# Patient Record
Sex: MALE | URBAN - METROPOLITAN AREA
[De-identification: names, ages, dates, MRNs, and addresses within clinical notes are randomized per-mention and may not be internally consistent; named-entity substitution may affect disease eponyms.]

---

## 2022-04-29 ENCOUNTER — NURSING HOME VISIT (OUTPATIENT)
Dept: NEPHROLOGY | Age: 52
End: 2022-04-29

## 2022-04-29 VITALS
SYSTOLIC BLOOD PRESSURE: 132 MMHG | HEART RATE: 105 BPM | WEIGHT: 223 LBS | TEMPERATURE: 97.9 F | OXYGEN SATURATION: 95 % | RESPIRATION RATE: 18 BRPM | HEIGHT: 69 IN | DIASTOLIC BLOOD PRESSURE: 72 MMHG | BODY MASS INDEX: 33.03 KG/M2

## 2022-04-29 DIAGNOSIS — E87.0 HYPEROSMOLALITY WITH HYPERNATREMIA: ICD-10-CM

## 2022-04-29 DIAGNOSIS — I10 PRIMARY HYPERTENSION: Primary | ICD-10-CM

## 2022-04-29 PROCEDURE — 99309 SBSQ NF CARE MODERATE MDM 30: CPT | Performed by: NURSE PRACTITIONER

## 2022-05-02 ENCOUNTER — NURSING HOME VISIT (OUTPATIENT)
Dept: NEPHROLOGY | Age: 52
End: 2022-05-02

## 2022-05-02 VITALS
OXYGEN SATURATION: 96 % | RESPIRATION RATE: 17 BRPM | TEMPERATURE: 97.1 F | DIASTOLIC BLOOD PRESSURE: 71 MMHG | HEART RATE: 103 BPM | SYSTOLIC BLOOD PRESSURE: 147 MMHG

## 2022-05-02 DIAGNOSIS — E87.0 HYPEROSMOLALITY WITH HYPERNATREMIA: ICD-10-CM

## 2022-05-02 DIAGNOSIS — R13.10 DYSPHAGIA, UNSPECIFIED TYPE: Primary | ICD-10-CM

## 2022-05-02 PROCEDURE — 99309 SBSQ NF CARE MODERATE MDM 30: CPT | Performed by: NURSE PRACTITIONER

## 2022-05-03 ENCOUNTER — NURSING HOME VISIT (OUTPATIENT)
Dept: NEPHROLOGY | Age: 52
End: 2022-05-03

## 2022-05-03 VITALS
DIASTOLIC BLOOD PRESSURE: 78 MMHG | SYSTOLIC BLOOD PRESSURE: 136 MMHG | TEMPERATURE: 97.4 F | HEART RATE: 79 BPM | BODY MASS INDEX: 32.93 KG/M2 | WEIGHT: 223 LBS | OXYGEN SATURATION: 93 % | RESPIRATION RATE: 18 BRPM

## 2022-05-03 DIAGNOSIS — I10 PRIMARY HYPERTENSION: ICD-10-CM

## 2022-05-03 DIAGNOSIS — R13.10 DYSPHAGIA, UNSPECIFIED TYPE: ICD-10-CM

## 2022-05-03 DIAGNOSIS — E87.0 HYPEROSMOLALITY WITH HYPERNATREMIA: Primary | ICD-10-CM

## 2022-05-03 PROCEDURE — 99309 SBSQ NF CARE MODERATE MDM 30: CPT | Performed by: NURSE PRACTITIONER

## 2022-05-05 ENCOUNTER — NURSING HOME VISIT (OUTPATIENT)
Dept: NEPHROLOGY | Age: 52
End: 2022-05-05

## 2022-05-05 VITALS
BODY MASS INDEX: 34.97 KG/M2 | WEIGHT: 236.8 LBS | RESPIRATION RATE: 17 BRPM | HEART RATE: 117 BPM | OXYGEN SATURATION: 96 % | TEMPERATURE: 97.2 F | SYSTOLIC BLOOD PRESSURE: 138 MMHG | DIASTOLIC BLOOD PRESSURE: 60 MMHG

## 2022-05-05 DIAGNOSIS — I10 PRIMARY HYPERTENSION: ICD-10-CM

## 2022-05-05 DIAGNOSIS — E87.0 HYPEROSMOLALITY WITH HYPERNATREMIA: Primary | ICD-10-CM

## 2022-05-05 PROCEDURE — 99309 SBSQ NF CARE MODERATE MDM 30: CPT | Performed by: NURSE PRACTITIONER

## 2022-05-09 ENCOUNTER — NURSING HOME VISIT (OUTPATIENT)
Dept: NEPHROLOGY | Age: 52
End: 2022-05-09

## 2022-05-09 VITALS
HEART RATE: 95 BPM | WEIGHT: 236.8 LBS | RESPIRATION RATE: 18 BRPM | TEMPERATURE: 98.1 F | OXYGEN SATURATION: 96 % | DIASTOLIC BLOOD PRESSURE: 60 MMHG | SYSTOLIC BLOOD PRESSURE: 122 MMHG | BODY MASS INDEX: 34.97 KG/M2

## 2022-05-09 DIAGNOSIS — E87.0 HYPEROSMOLALITY WITH HYPERNATREMIA: Primary | ICD-10-CM

## 2022-05-09 DIAGNOSIS — I10 PRIMARY HYPERTENSION: ICD-10-CM

## 2022-05-09 PROCEDURE — 99309 SBSQ NF CARE MODERATE MDM 30: CPT | Performed by: NURSE PRACTITIONER

## 2022-05-17 ENCOUNTER — NURSING HOME VISIT (OUTPATIENT)
Dept: NEPHROLOGY | Age: 52
End: 2022-05-17

## 2022-05-17 VITALS
BODY MASS INDEX: 34.94 KG/M2 | RESPIRATION RATE: 18 BRPM | TEMPERATURE: 97.1 F | SYSTOLIC BLOOD PRESSURE: 132 MMHG | DIASTOLIC BLOOD PRESSURE: 78 MMHG | HEART RATE: 100 BPM | OXYGEN SATURATION: 95 % | WEIGHT: 236.6 LBS

## 2022-05-17 DIAGNOSIS — E87.0 HYPEROSMOLALITY WITH HYPERNATREMIA: Primary | ICD-10-CM

## 2022-05-17 DIAGNOSIS — N17.9 AKI (ACUTE KIDNEY INJURY) (CMD): ICD-10-CM

## 2022-05-17 DIAGNOSIS — R13.10 DYSPHAGIA, UNSPECIFIED TYPE: ICD-10-CM

## 2022-05-17 PROCEDURE — 99309 SBSQ NF CARE MODERATE MDM 30: CPT | Performed by: NURSE PRACTITIONER

## 2022-05-27 PROBLEM — N39.0 ACUTE UTI: Status: ACTIVE | Noted: 2022-05-27

## 2022-05-27 PROBLEM — I69.320 APHASIA AS LATE EFFECT OF CEREBROVASCULAR ACCIDENT: Status: ACTIVE | Noted: 2022-05-27

## 2022-05-27 PROBLEM — R33.9 URINARY RETENTION: Status: ACTIVE | Noted: 2022-05-27

## 2022-05-27 PROBLEM — L89.310: Status: ACTIVE | Noted: 2022-05-27

## 2022-05-27 PROBLEM — G81.91 RIGHT HEMIPLEGIA (HCC): Status: ACTIVE | Noted: 2022-05-27

## 2022-05-27 PROBLEM — I10 PRIMARY HYPERTENSION: Status: ACTIVE | Noted: 2022-05-27

## 2022-05-27 PROBLEM — I61.9 HEMORRHAGIC CEREBROVASCULAR ACCIDENT (CVA) (HCC): Status: ACTIVE | Noted: 2022-05-27

## 2022-05-27 PROBLEM — R13.12 OROPHARYNGEAL DYSPHAGIA: Status: ACTIVE | Noted: 2022-05-27

## 2022-05-27 PROBLEM — L89.320 PRESSURE INJURY OF LEFT BUTTOCK, UNSTAGEABLE (HCC): Status: ACTIVE | Noted: 2022-05-27

## 2022-06-07 ENCOUNTER — HOSPITAL ENCOUNTER (INPATIENT)
Facility: HOSPITAL | Age: 52
LOS: 3 days | Discharge: NURSING, REHAB OR LONG-TERM CARE | End: 2022-06-10
Attending: EMERGENCY MEDICINE | Admitting: INTERNAL MEDICINE
Payer: COMMERCIAL

## 2022-06-07 ENCOUNTER — APPOINTMENT (OUTPATIENT)
Dept: GENERAL RADIOLOGY | Facility: HOSPITAL | Age: 52
End: 2022-06-07
Attending: EMERGENCY MEDICINE
Payer: COMMERCIAL

## 2022-06-07 DIAGNOSIS — N39.0 SEPSIS DUE TO URINARY TRACT INFECTION (HCC): Primary | ICD-10-CM

## 2022-06-07 DIAGNOSIS — A41.9 SEPSIS DUE TO URINARY TRACT INFECTION (HCC): Primary | ICD-10-CM

## 2022-06-07 LAB
ADENOVIRUS PCR:: NOT DETECTED
ALBUMIN SERPL-MCNC: 2.5 G/DL (ref 3.4–5)
ALBUMIN/GLOB SERPL: 0.5 {RATIO} (ref 1–2)
ALP LIVER SERPL-CCNC: 116 U/L
ALT SERPL-CCNC: 45 U/L
ANION GAP SERPL CALC-SCNC: 9 MMOL/L (ref 0–18)
AST SERPL-CCNC: 27 U/L (ref 15–37)
ATRIAL RATE: 133 BPM
B PARAPERT DNA SPEC QL NAA+PROBE: NOT DETECTED
B PERT DNA SPEC QL NAA+PROBE: NOT DETECTED
BASOPHILS # BLD AUTO: 0.03 X10(3) UL (ref 0–0.2)
BASOPHILS NFR BLD AUTO: 0.2 %
BILIRUB SERPL-MCNC: 0.4 MG/DL (ref 0.1–2)
BILIRUB UR QL STRIP.AUTO: NEGATIVE
BUN BLD-MCNC: 27 MG/DL (ref 7–18)
C PNEUM DNA SPEC QL NAA+PROBE: NOT DETECTED
CALCIUM BLD-MCNC: 9.6 MG/DL (ref 8.5–10.1)
CHLORIDE SERPL-SCNC: 104 MMOL/L (ref 98–112)
CO2 SERPL-SCNC: 24 MMOL/L (ref 21–32)
COLOR UR AUTO: YELLOW
CORONAVIRUS 229E PCR:: NOT DETECTED
CORONAVIRUS HKU1 PCR:: NOT DETECTED
CORONAVIRUS NL63 PCR:: NOT DETECTED
CORONAVIRUS OC43 PCR:: NOT DETECTED
CREAT BLD-MCNC: 0.97 MG/DL
EOSINOPHIL # BLD AUTO: 0.01 X10(3) UL (ref 0–0.7)
EOSINOPHIL NFR BLD AUTO: 0.1 %
ERYTHROCYTE [DISTWIDTH] IN BLOOD BY AUTOMATED COUNT: 12.8 %
FLUAV RNA SPEC QL NAA+PROBE: NOT DETECTED
FLUBV RNA SPEC QL NAA+PROBE: NOT DETECTED
GLOBULIN PLAS-MCNC: 4.8 G/DL (ref 2.8–4.4)
GLUCOSE BLD-MCNC: 128 MG/DL (ref 70–99)
GLUCOSE UR STRIP.AUTO-MCNC: NEGATIVE MG/DL
HCT VFR BLD AUTO: 30 %
HGB BLD-MCNC: 9.8 G/DL
HYALINE CASTS #/AREA URNS AUTO: PRESENT /LPF
IMM GRANULOCYTES # BLD AUTO: 0.07 X10(3) UL (ref 0–1)
IMM GRANULOCYTES NFR BLD: 0.5 %
INR BLD: 1.4 (ref 0.8–1.2)
LACTATE SERPL-SCNC: 0.8 MMOL/L (ref 0.4–2)
LYMPHOCYTES # BLD AUTO: 0.77 X10(3) UL (ref 1–4)
LYMPHOCYTES NFR BLD AUTO: 5.7 %
MCH RBC QN AUTO: 29 PG (ref 26–34)
MCHC RBC AUTO-ENTMCNC: 32.7 G/DL (ref 31–37)
MCV RBC AUTO: 88.8 FL
METAPNEUMOVIRUS PCR:: NOT DETECTED
MONOCYTES # BLD AUTO: 0.63 X10(3) UL (ref 0.1–1)
MONOCYTES NFR BLD AUTO: 4.7 %
MYCOPLASMA PNEUMONIA PCR:: NOT DETECTED
NEUTROPHILS # BLD AUTO: 11.95 X10 (3) UL (ref 1.5–7.7)
NEUTROPHILS # BLD AUTO: 11.95 X10(3) UL (ref 1.5–7.7)
NEUTROPHILS NFR BLD AUTO: 88.8 %
NITRITE UR QL STRIP.AUTO: NEGATIVE
OSMOLALITY SERPL CALC.SUM OF ELEC: 291 MOSM/KG (ref 275–295)
P AXIS: 54 DEGREES
P-R INTERVAL: 144 MS
PARAINFLUENZA 1 PCR:: NOT DETECTED
PARAINFLUENZA 2 PCR:: NOT DETECTED
PARAINFLUENZA 3 PCR:: NOT DETECTED
PARAINFLUENZA 4 PCR:: NOT DETECTED
PH UR STRIP.AUTO: 7 [PH] (ref 5–8)
PLATELET # BLD AUTO: 415 10(3)UL (ref 150–450)
POTASSIUM SERPL-SCNC: 4.2 MMOL/L (ref 3.5–5.1)
PROCALCITONIN SERPL-MCNC: 0.56 NG/ML (ref ?–0.16)
PROT SERPL-MCNC: 7.3 G/DL (ref 6.4–8.2)
PROT UR STRIP.AUTO-MCNC: 100 MG/DL
PROTHROMBIN TIME: 17.2 SECONDS (ref 11.6–14.8)
Q-T INTERVAL: 292 MS
QRS DURATION: 84 MS
QTC CALCULATION (BEZET): 434 MS
R AXIS: -1 DEGREES
RBC # BLD AUTO: 3.38 X10(6)UL
RBC #/AREA URNS AUTO: >10 /HPF
RHINOVIRUS/ENTERO PCR:: NOT DETECTED
RSV RNA SPEC QL NAA+PROBE: NOT DETECTED
SARS-COV-2 RNA NPH QL NAA+NON-PROBE: NOT DETECTED
SODIUM SERPL-SCNC: 137 MMOL/L (ref 136–145)
SP GR UR STRIP.AUTO: 1.02 (ref 1–1.03)
T AXIS: 52 DEGREES
UROBILINOGEN UR STRIP.AUTO-MCNC: 4 MG/DL
VENTRICULAR RATE: 133 BPM
WBC # BLD AUTO: 13.5 X10(3) UL (ref 4–11)

## 2022-06-07 PROCEDURE — 85610 PROTHROMBIN TIME: CPT | Performed by: EMERGENCY MEDICINE

## 2022-06-07 PROCEDURE — 99285 EMERGENCY DEPT VISIT HI MDM: CPT

## 2022-06-07 PROCEDURE — 97162 PT EVAL MOD COMPLEX 30 MIN: CPT

## 2022-06-07 PROCEDURE — 96375 TX/PRO/DX INJ NEW DRUG ADDON: CPT

## 2022-06-07 PROCEDURE — 97530 THERAPEUTIC ACTIVITIES: CPT

## 2022-06-07 PROCEDURE — 87077 CULTURE AEROBIC IDENTIFY: CPT | Performed by: EMERGENCY MEDICINE

## 2022-06-07 PROCEDURE — 84145 PROCALCITONIN (PCT): CPT | Performed by: EMERGENCY MEDICINE

## 2022-06-07 PROCEDURE — 93005 ELECTROCARDIOGRAM TRACING: CPT

## 2022-06-07 PROCEDURE — 80053 COMPREHEN METABOLIC PANEL: CPT | Performed by: EMERGENCY MEDICINE

## 2022-06-07 PROCEDURE — 96376 TX/PRO/DX INJ SAME DRUG ADON: CPT

## 2022-06-07 PROCEDURE — 85025 COMPLETE CBC W/AUTO DIFF WBC: CPT | Performed by: EMERGENCY MEDICINE

## 2022-06-07 PROCEDURE — 96361 HYDRATE IV INFUSION ADD-ON: CPT

## 2022-06-07 PROCEDURE — 92523 SPEECH SOUND LANG COMPREHEN: CPT

## 2022-06-07 PROCEDURE — 84244 ASSAY OF RENIN: CPT | Performed by: INTERNAL MEDICINE

## 2022-06-07 PROCEDURE — 97166 OT EVAL MOD COMPLEX 45 MIN: CPT

## 2022-06-07 PROCEDURE — 83835 ASSAY OF METANEPHRINES: CPT | Performed by: INTERNAL MEDICINE

## 2022-06-07 PROCEDURE — 93010 ELECTROCARDIOGRAM REPORT: CPT

## 2022-06-07 PROCEDURE — 0202U NFCT DS 22 TRGT SARS-COV-2: CPT | Performed by: EMERGENCY MEDICINE

## 2022-06-07 PROCEDURE — 71045 X-RAY EXAM CHEST 1 VIEW: CPT | Performed by: EMERGENCY MEDICINE

## 2022-06-07 PROCEDURE — 87040 BLOOD CULTURE FOR BACTERIA: CPT | Performed by: EMERGENCY MEDICINE

## 2022-06-07 PROCEDURE — 92610 EVALUATE SWALLOWING FUNCTION: CPT

## 2022-06-07 PROCEDURE — 87186 SC STD MICRODIL/AGAR DIL: CPT | Performed by: EMERGENCY MEDICINE

## 2022-06-07 PROCEDURE — 82088 ASSAY OF ALDOSTERONE: CPT | Performed by: INTERNAL MEDICINE

## 2022-06-07 PROCEDURE — 87086 URINE CULTURE/COLONY COUNT: CPT | Performed by: EMERGENCY MEDICINE

## 2022-06-07 PROCEDURE — 83605 ASSAY OF LACTIC ACID: CPT | Performed by: EMERGENCY MEDICINE

## 2022-06-07 PROCEDURE — 96366 THER/PROPH/DIAG IV INF ADDON: CPT

## 2022-06-07 PROCEDURE — 96365 THER/PROPH/DIAG IV INF INIT: CPT

## 2022-06-07 PROCEDURE — 81001 URINALYSIS AUTO W/SCOPE: CPT | Performed by: EMERGENCY MEDICINE

## 2022-06-07 RX ORDER — CLONIDINE HYDROCHLORIDE 0.2 MG/1
0.2 TABLET ORAL 2 TIMES DAILY
Status: DISCONTINUED | OUTPATIENT
Start: 2022-06-07 | End: 2022-06-07

## 2022-06-07 RX ORDER — CLONIDINE HYDROCHLORIDE 0.2 MG/1
0.2 TABLET ORAL 3 TIMES DAILY
Status: DISCONTINUED | OUTPATIENT
Start: 2022-06-07 | End: 2022-06-10

## 2022-06-07 RX ORDER — ACETAMINOPHEN 650 MG/1
650 SUPPOSITORY RECTAL ONCE
Status: COMPLETED | OUTPATIENT
Start: 2022-06-07 | End: 2022-06-07

## 2022-06-07 RX ORDER — METOPROLOL TARTRATE 100 MG/1
100 TABLET ORAL
Status: DISCONTINUED | OUTPATIENT
Start: 2022-06-07 | End: 2022-06-10

## 2022-06-07 RX ORDER — HYDROCODONE BITARTRATE AND ACETAMINOPHEN 5; 325 MG/1; MG/1
1 TABLET ORAL EVERY 4 HOURS PRN
Status: DISCONTINUED | OUTPATIENT
Start: 2022-06-07 | End: 2022-06-10

## 2022-06-07 RX ORDER — LACTOSE-REDUCED FOOD/FIBER 0.06 G-1.5
LIQUID (ML) ORAL
COMMUNITY

## 2022-06-07 RX ORDER — LABETALOL HYDROCHLORIDE 5 MG/ML
20 INJECTION, SOLUTION INTRAVENOUS ONCE
Status: COMPLETED | OUTPATIENT
Start: 2022-06-07 | End: 2022-06-07

## 2022-06-07 RX ORDER — ONDANSETRON 2 MG/ML
4 INJECTION INTRAMUSCULAR; INTRAVENOUS EVERY 6 HOURS PRN
Status: DISCONTINUED | OUTPATIENT
Start: 2022-06-07 | End: 2022-06-10

## 2022-06-07 RX ORDER — MELATONIN
325 3 TIMES DAILY
COMMUNITY

## 2022-06-07 RX ORDER — ACETAMINOPHEN 325 MG/1
650 TABLET ORAL EVERY 4 HOURS PRN
Status: DISCONTINUED | OUTPATIENT
Start: 2022-06-07 | End: 2022-06-10

## 2022-06-07 RX ORDER — LISINOPRIL 40 MG/1
40 TABLET ORAL DAILY
Status: DISCONTINUED | OUTPATIENT
Start: 2022-06-07 | End: 2022-06-10

## 2022-06-07 RX ORDER — HYDRALAZINE HYDROCHLORIDE 50 MG/1
100 TABLET, FILM COATED ORAL 3 TIMES DAILY
Status: DISCONTINUED | OUTPATIENT
Start: 2022-06-07 | End: 2022-06-10

## 2022-06-07 RX ORDER — CLONIDINE HYDROCHLORIDE 0.2 MG/1
0.2 TABLET ORAL 2 TIMES DAILY
COMMUNITY

## 2022-06-07 RX ORDER — SODIUM CHLORIDE 9 MG/ML
INJECTION, SOLUTION INTRAVENOUS CONTINUOUS
Status: DISCONTINUED | OUTPATIENT
Start: 2022-06-07 | End: 2022-06-10

## 2022-06-07 RX ORDER — METHYLPHENIDATE HYDROCHLORIDE 5 MG/1
2.5 TABLET ORAL EVERY MORNING
COMMUNITY
End: 2022-06-10

## 2022-06-07 RX ORDER — MORPHINE SULFATE 4 MG/ML
4 INJECTION, SOLUTION INTRAMUSCULAR; INTRAVENOUS EVERY 2 HOUR PRN
Status: DISCONTINUED | OUTPATIENT
Start: 2022-06-07 | End: 2022-06-10

## 2022-06-07 RX ORDER — LISINOPRIL 40 MG/1
40 TABLET ORAL DAILY
COMMUNITY

## 2022-06-07 RX ORDER — SPIRONOLACTONE 25 MG/1
12.5 TABLET ORAL 2 TIMES DAILY
Status: DISCONTINUED | OUTPATIENT
Start: 2022-06-07 | End: 2022-06-07

## 2022-06-07 RX ORDER — MORPHINE SULFATE 2 MG/ML
1 INJECTION, SOLUTION INTRAMUSCULAR; INTRAVENOUS EVERY 2 HOUR PRN
Status: DISCONTINUED | OUTPATIENT
Start: 2022-06-07 | End: 2022-06-10

## 2022-06-07 RX ORDER — DOXAZOSIN 8 MG/1
8 TABLET ORAL NIGHTLY
COMMUNITY

## 2022-06-07 RX ORDER — SPIRONOLACTONE 25 MG/1
12.5 TABLET ORAL 2 TIMES DAILY
COMMUNITY
End: 2022-06-10

## 2022-06-07 RX ORDER — AMLODIPINE BESYLATE 10 MG/1
10 TABLET ORAL DAILY
COMMUNITY

## 2022-06-07 RX ORDER — HYDRALAZINE HYDROCHLORIDE 100 MG/1
100 TABLET, FILM COATED ORAL 3 TIMES DAILY
COMMUNITY

## 2022-06-07 RX ORDER — ENOXAPARIN SODIUM 100 MG/ML
40 INJECTION SUBCUTANEOUS DAILY
Status: DISCONTINUED | OUTPATIENT
Start: 2022-06-07 | End: 2022-06-07

## 2022-06-07 RX ORDER — TERAZOSIN 10 MG/1
10 CAPSULE ORAL NIGHTLY
Status: DISCONTINUED | OUTPATIENT
Start: 2022-06-07 | End: 2022-06-10

## 2022-06-07 RX ORDER — LABETALOL HYDROCHLORIDE 5 MG/ML
20 INJECTION, SOLUTION INTRAVENOUS EVERY 4 HOURS PRN
Status: DISCONTINUED | OUTPATIENT
Start: 2022-06-07 | End: 2022-06-10

## 2022-06-07 RX ORDER — MORPHINE SULFATE 2 MG/ML
2 INJECTION, SOLUTION INTRAMUSCULAR; INTRAVENOUS EVERY 2 HOUR PRN
Status: DISCONTINUED | OUTPATIENT
Start: 2022-06-07 | End: 2022-06-10

## 2022-06-07 RX ORDER — HYDROCODONE BITARTRATE AND ACETAMINOPHEN 5; 325 MG/1; MG/1
2 TABLET ORAL EVERY 4 HOURS PRN
Status: DISCONTINUED | OUTPATIENT
Start: 2022-06-07 | End: 2022-06-10

## 2022-06-07 RX ORDER — ARGININE/ASCORBATE SOD/VITE AC 4.5 G/9.2G
1 POWDER IN PACKET (EA) ORAL 2 TIMES DAILY
COMMUNITY

## 2022-06-07 RX ORDER — AMLODIPINE BESYLATE 5 MG/1
5 TABLET ORAL 2 TIMES DAILY
Status: DISCONTINUED | OUTPATIENT
Start: 2022-06-07 | End: 2022-06-10

## 2022-06-07 RX ORDER — ACETAMINOPHEN 325 MG/1
650 TABLET ORAL EVERY 6 HOURS PRN
Status: DISCONTINUED | OUTPATIENT
Start: 2022-06-07 | End: 2022-06-10

## 2022-06-07 RX ORDER — ACETAMINOPHEN 325 MG/1
650 TABLET ORAL EVERY 6 HOURS PRN
COMMUNITY

## 2022-06-07 RX ORDER — PROCHLORPERAZINE EDISYLATE 5 MG/ML
5 INJECTION INTRAMUSCULAR; INTRAVENOUS EVERY 8 HOURS PRN
Status: DISCONTINUED | OUTPATIENT
Start: 2022-06-07 | End: 2022-06-10

## 2022-06-07 RX ORDER — METOPROLOL TARTRATE 100 MG/1
100 TABLET ORAL 2 TIMES DAILY
COMMUNITY

## 2022-06-07 NOTE — PROGRESS NOTES
NURSING ADMISSION NOTE      Patient admitted via Cart  Oriented to room. Safety precautions initiated. Bed in low position. Call light in reach. Pt unable to communicate d/t recent stroke. Fiance at bedside to help with admission process. From [facility]. Facility paperwork utilized as well. Skin check completed with [facility].

## 2022-06-07 NOTE — ED QUICK NOTES
Orders for admission, patient is aware of plan and ready to go upstairs. Any questions, please call ED CHAD Arnold at extension [phone]. Vaccinated?   Type of COVID test sent:rapid flu panel  COVID Suspicion level: negative    Titratable drug(s) infusing:none  Rate:    LOC at time of transport:aphasia    Other pertinent information:pt had global stroke 4./3/2022    CIWA score=  NIH score=

## 2022-06-07 NOTE — ED INITIAL ASSESSMENT (HPI)
PT ARRIVED VIA [facility] FROM [facility]. CALLED FOR ELEVATED WBC.21.19. FEBRILE, UNKNOWN LAST COVID TEST.

## 2022-06-08 ENCOUNTER — APPOINTMENT (OUTPATIENT)
Dept: ULTRASOUND IMAGING | Facility: HOSPITAL | Age: 52
End: 2022-06-08
Attending: INTERNAL MEDICINE
Payer: COMMERCIAL

## 2022-06-08 LAB
ANION GAP SERPL CALC-SCNC: 9 MMOL/L (ref 0–18)
BASOPHILS # BLD AUTO: 0.03 X10(3) UL (ref 0–0.2)
BASOPHILS NFR BLD AUTO: 0.7 %
BUN BLD-MCNC: 24 MG/DL (ref 7–18)
CALCIUM BLD-MCNC: 8.8 MG/DL (ref 8.5–10.1)
CHLORIDE SERPL-SCNC: 107 MMOL/L (ref 98–112)
CO2 SERPL-SCNC: 22 MMOL/L (ref 21–32)
CREAT BLD-MCNC: 0.97 MG/DL
EOSINOPHIL # BLD AUTO: 0.07 X10(3) UL (ref 0–0.7)
EOSINOPHIL NFR BLD AUTO: 1.5 %
ERYTHROCYTE [DISTWIDTH] IN BLOOD BY AUTOMATED COUNT: 13.1 %
GLUCOSE BLD-MCNC: 141 MG/DL (ref 70–99)
GLUCOSE BLD-MCNC: 147 MG/DL (ref 70–99)
GLUCOSE BLD-MCNC: 158 MG/DL (ref 70–99)
GLUCOSE BLD-MCNC: 161 MG/DL (ref 70–99)
HCT VFR BLD AUTO: 25.9 %
HGB BLD-MCNC: 8.1 G/DL
IMM GRANULOCYTES # BLD AUTO: 0.02 X10(3) UL (ref 0–1)
IMM GRANULOCYTES NFR BLD: 0.4 %
LYMPHOCYTES # BLD AUTO: 0.86 X10(3) UL (ref 1–4)
LYMPHOCYTES NFR BLD AUTO: 18.9 %
MCH RBC QN AUTO: 28.4 PG (ref 26–34)
MCHC RBC AUTO-ENTMCNC: 31.3 G/DL (ref 31–37)
MCV RBC AUTO: 90.9 FL
MONOCYTES # BLD AUTO: 0.56 X10(3) UL (ref 0.1–1)
MONOCYTES NFR BLD AUTO: 12.3 %
NEUTROPHILS # BLD AUTO: 3 X10 (3) UL (ref 1.5–7.7)
NEUTROPHILS # BLD AUTO: 3 X10(3) UL (ref 1.5–7.7)
NEUTROPHILS NFR BLD AUTO: 66.2 %
OSMOLALITY SERPL CALC.SUM OF ELEC: 293 MOSM/KG (ref 275–295)
PLATELET # BLD AUTO: 325 10(3)UL (ref 150–450)
POTASSIUM SERPL-SCNC: 3.5 MMOL/L (ref 3.5–5.1)
RBC # BLD AUTO: 2.85 X10(6)UL
SODIUM SERPL-SCNC: 138 MMOL/L (ref 136–145)
WBC # BLD AUTO: 4.5 X10(3) UL (ref 4–11)

## 2022-06-08 PROCEDURE — 93975 VASCULAR STUDY: CPT | Performed by: INTERNAL MEDICINE

## 2022-06-08 PROCEDURE — 92507 TX SP LANG VOICE COMM INDIV: CPT

## 2022-06-08 PROCEDURE — 80048 BASIC METABOLIC PNL TOTAL CA: CPT | Performed by: INTERNAL MEDICINE

## 2022-06-08 PROCEDURE — 82962 GLUCOSE BLOOD TEST: CPT

## 2022-06-08 PROCEDURE — 76775 US EXAM ABDO BACK WALL LIM: CPT | Performed by: INTERNAL MEDICINE

## 2022-06-08 PROCEDURE — 92526 ORAL FUNCTION THERAPY: CPT

## 2022-06-08 PROCEDURE — 85025 COMPLETE CBC W/AUTO DIFF WBC: CPT | Performed by: INTERNAL MEDICINE

## 2022-06-08 RX ORDER — NICOTINE POLACRILEX 4 MG
30 LOZENGE BUCCAL
Status: DISCONTINUED | OUTPATIENT
Start: 2022-06-08 | End: 2022-06-10

## 2022-06-08 RX ORDER — NICOTINE POLACRILEX 4 MG
15 LOZENGE BUCCAL
Status: DISCONTINUED | OUTPATIENT
Start: 2022-06-08 | End: 2022-06-10

## 2022-06-08 RX ORDER — DEXTROSE MONOHYDRATE 25 G/50ML
50 INJECTION, SOLUTION INTRAVENOUS
Status: DISCONTINUED | OUTPATIENT
Start: 2022-06-08 | End: 2022-06-10

## 2022-06-08 NOTE — CM/SW NOTE
Spoke with patient's Wilner Orta, regarding discharge planning. Informed her that [facility] are the only accepting SNF options at this time. She plans to follow up with Molly to find out more about their LTC. Alesha Captain of PT/OT recommendation for acute rehab. Awaiting PMR consult for further recommendations. SW will continue to follow.      EDGARD Natarajan  Discharge Planner  [phone]

## 2022-06-09 LAB
GLUCOSE BLD-MCNC: 143 MG/DL (ref 70–99)
GLUCOSE BLD-MCNC: 151 MG/DL (ref 70–99)
GLUCOSE BLD-MCNC: 156 MG/DL (ref 70–99)
GLUCOSE BLD-MCNC: 179 MG/DL (ref 70–99)
METANEPHRINE: 0.13 NMOL/L
NORMETANEPHRINE: 0.84 NMOL/L

## 2022-06-09 PROCEDURE — 82962 GLUCOSE BLOOD TEST: CPT

## 2022-06-09 PROCEDURE — 99214 OFFICE O/P EST MOD 30 MIN: CPT

## 2022-06-09 PROCEDURE — 97530 THERAPEUTIC ACTIVITIES: CPT

## 2022-06-09 PROCEDURE — 92507 TX SP LANG VOICE COMM INDIV: CPT

## 2022-06-09 RX ORDER — CEFAZOLIN SODIUM/WATER 2 G/20 ML
2 SYRINGE (ML) INTRAVENOUS EVERY 8 HOURS
Status: DISCONTINUED | OUTPATIENT
Start: 2022-06-10 | End: 2022-06-10

## 2022-06-09 NOTE — CM/SW NOTE
Spoke with patient's Eliza Granger, regarding discharge planning. Informed her that PMR consult recommends [facility], she understood. She is scheduled to tour the [facility] later today and cannot tour sooner because she is working. She plans to update SW with SNF choice this evening in anticipation of discharge tomorrow. SW will continue to follow.      Sun Yin, EDGARD  Discharge Planner  [phone]

## 2022-06-10 VITALS
HEIGHT: 67 IN | BODY MASS INDEX: 35.47 KG/M2 | DIASTOLIC BLOOD PRESSURE: 71 MMHG | WEIGHT: 226 LBS | TEMPERATURE: 99 F | RESPIRATION RATE: 19 BRPM | HEART RATE: 97 BPM | OXYGEN SATURATION: 97 % | SYSTOLIC BLOOD PRESSURE: 168 MMHG

## 2022-06-10 LAB
ALDOSTERONE/RENIN ACTIVITY RATIO: 95.4 RATIO
ALDOSTERONE: 9.5 NG/DL
GLUCOSE BLD-MCNC: 126 MG/DL (ref 70–99)
GLUCOSE BLD-MCNC: 129 MG/DL (ref 70–99)
GLUCOSE BLD-MCNC: 159 MG/DL (ref 70–99)
RENIN ACTIVITY: 0.1 NG/ML/HR

## 2022-06-10 PROCEDURE — 82962 GLUCOSE BLOOD TEST: CPT

## 2022-06-10 PROCEDURE — 92610 EVALUATE SWALLOWING FUNCTION: CPT

## 2022-06-10 RX ORDER — CEPHALEXIN 500 MG/1
500 CAPSULE ORAL 4 TIMES DAILY
Qty: 28 CAPSULE | Refills: 0 | Status: SHIPPED | OUTPATIENT
Start: 2022-06-10 | End: 2022-06-17

## 2022-06-10 NOTE — PLAN OF CARE
Assumed care of patient at 1900. Global aphasia, blinks or nods eyes, follows commands. Spo2 at 97% on room air. Normal sinus rhythm on tele monitor, SCDs/heel protectors on. Chronic castillo intact, draining yellow urine. NPO/continuous tube feeds as ordered via peg tube. BM tonight, briefed for incontinence. Sacral wound, daily dressing changes as ordered/specialty bed. Total care- pt's family member at bedside tonight, updated with POC.  Plan of care: IV abx, IVF at 75ml/hr, accuchecks q 6hrs, [facility] at discharge    Problem: Patient/Family Goals  Goal: Patient/Family Long Term Goal  Description: Patient's Long Term Goal: increased strength     Interventions:  - encourage participation with turns, PT/OT as ordered, plan for acute rehab at discharge  - See additional Care Plan goals for specific interventions  Outcome: Progressing  Goal: Patient/Family Short Term Goal  Description: Patient's Short Term Goal: discharge from hospital    Interventions:   - maintain tube feedings as ordered, maintain aspiration precautions, medications as ordered   - See additional Care Plan goals for specific interventions  Outcome: Progressing

## 2022-06-10 NOTE — PLAN OF CARE
Pt alert. Pt aphasic from recent stroke. Able to blink and nod appropriately to questions. Jevity 1.5 running continuously at rate of 60cc/hr with 185ml water flush q4 hours. No residual noted. No vomiting. Dressing changed to sacrum. Envella bed to reduce riskf or pressure to bony prominences. Denies pain. Meds crushed thru gtube. Tube flushing well. Plan dc to [facility] later today. Family aware and are agreeable to dc via ambulance. Will continue to monitor and provide for comfort and safety. Problem: CARDIOVASCULAR - ADULT  Goal: Maintains optimal cardiac output and hemodynamic stability  Description: INTERVENTIONS:  - Monitor vital signs, rhythm, and trends  - Monitor for bleeding, hypotension and signs of decreased cardiac output  - Evaluate effectiveness of vasoactive medications to optimize hemodynamic stability  - Monitor arterial and/or venous puncture sites for bleeding and/or hematoma  - Assess quality of pulses, skin color and temperature  - Assess for signs of decreased coronary artery perfusion - ex.  Angina  - Evaluate fluid balance, assess for edema, trend weights  Outcome: Progressing  Goal: Absence of cardiac arrhythmias or at baseline  Description: INTERVENTIONS:  - Continuous cardiac monitoring, monitor vital signs, obtain 12 lead EKG if indicated  - Evaluate effectiveness of antiarrhythmic and heart rate control medications as ordered  - Initiate emergency measures for life threatening arrhythmias  - Monitor electrolytes and administer replacement therapy as ordered  Outcome: Progressing     Problem: RESPIRATORY - ADULT  Goal: Achieves optimal ventilation and oxygenation  Description: INTERVENTIONS:  - Assess for changes in respiratory status  - Assess for changes in mentation and behavior  - Position to facilitate oxygenation and minimize respiratory effort  - Oxygen supplementation based on oxygen saturation or ABGs  - Provide Smoking Cessation handout, if applicable  - Encourage broncho-pulmonary hygiene including cough, deep breathe, Incentive Spirometry  - Assess the need for suctioning and perform as needed  - Assess and instruct to report SOB or any respiratory difficulty  - Respiratory Therapy support as indicated  - Manage/alleviate anxiety  - Monitor for signs/symptoms of CO2 retention  Outcome: Progressing     Problem: GASTROINTESTINAL - ADULT  Goal: Minimal or absence of nausea and vomiting  Description: INTERVENTIONS:  - Maintain adequate hydration with IV or PO as ordered and tolerated  - Nasogastric tube to low intermittent suction as ordered  - Evaluate effectiveness of ordered antiemetic medications  - Provide nonpharmacologic comfort measures as appropriate  - Advance diet as tolerated, if ordered  - Obtain nutritional consult as needed  - Evaluate fluid balance  Outcome: Progressing     Problem: GASTROINTESTINAL - ADULT  Goal: Minimal or absence of nausea and vomiting  Description: INTERVENTIONS:  - Maintain adequate hydration with IV or PO as ordered and tolerated  - Nasogastric tube to low intermittent suction as ordered  - Evaluate effectiveness of ordered antiemetic medications  - Provide nonpharmacologic comfort measures as appropriate  - Advance diet as tolerated, if ordered  - Obtain nutritional consult as needed  - Evaluate fluid balance  Outcome: Progressing     Problem: SKIN/TISSUE INTEGRITY - ADULT  Goal: Oral mucous membranes remain intact  Description: INTERVENTIONS  - Assess oral mucosa and hygiene practices  - Implement preventative oral hygiene regimen  - Implement oral medicated treatments as ordered  Outcome: Progressing     Problem: NEUROLOGICAL - ADULT  Goal: Achieves stable or improved neurological status  Description: INTERVENTIONS  - Assess for and report changes in neurological status  - Initiate measures to prevent increased intracranial pressure  - Maintain blood pressure and fluid volume within ordered parameters to optimize cerebral perfusion and minimize risk of hemorrhage  - Monitor temperature, glucose, and sodium.  Initiate appropriate interventions as ordered  Outcome: Progressing     Problem: Impaired Swallowing  Goal: Minimize aspiration risk  Description: Interventions:  - Patient should be alert and upright for all feedings (90 degrees preferred)  - Offer food and liquids at a slow rate  - No straws  - Encourage small bites of food and small sips of liquid  - Offer pills one at a time, crush or deliver with applesauce as needed  - Discontinue feeding and notify MD (or speech pathologist) if coughing or persistent throat clearing or wet/gurgly vocal quality is noted  Outcome: Progressing

## 2022-06-10 NOTE — CM/SW NOTE
Informed by MD that patient is medically cleared for discharge today. Met with patient, bob Sun) at bedside, and brother Onesimo Roach) BEVERLY regarding discharge planning. They toured the [facility] last night and are no longer interested. Informed fiance/brother that patient is medically cleared for discharge today. Current plan is for patient to return to [facility] at discharge. Reached out to [facility] to update on discharge today and request they submit for insurance auth. They plan on continuing to look for new SNF placement. Encouraged fiance/brother to follow up with [facility] SW to begin working on transfer to another SNF. SW sent out referrals in [address] [place] [address] Road to their requested SNFs: [facility], [facility], [facility], and [place]. Fiance/brother plan to follow up with these SNFs regarding acceptance after patient discharges. SW will continue to follow. Addendum 3:40pm  Cate Atkins confirmed discharge today. Spoke with brother, Kae Galvin, to answer questions and confirmed discharge back to [facility] this evening. Spoke with [facility] and scheduled  at 5:45pm. PCS form completed. Updated RN. CANDELARIA will remain available.      [address] [facility] [employer]  Phone: [phone]    [employer]  [facility]  Discharge Planner  [phone]

## 2022-06-13 NOTE — PAYOR COMM NOTE
Discharge Notification    Patient Name: Morales Alarcon  Payor: [address] [place] [address] Drive #: [id number]  Authorization Number: [id number]  Admit Date/Time: 6/7/2022 12:07 AM  Discharge Date/Time: 6/10/2022 6:55 PM

## 2022-10-25 ENCOUNTER — APPOINTMENT (OUTPATIENT)
Dept: CT IMAGING | Facility: HOSPITAL | Age: 52
End: 2022-10-25
Attending: EMERGENCY MEDICINE
Payer: MEDICAID

## 2022-10-25 ENCOUNTER — HOSPITAL ENCOUNTER (INPATIENT)
Facility: HOSPITAL | Age: 52
LOS: 4 days | Discharge: NURSING, REHAB OR LONG-TERM CARE | End: 2022-10-29
Attending: EMERGENCY MEDICINE | Admitting: INTERNAL MEDICINE
Payer: MEDICAID

## 2022-10-25 ENCOUNTER — APPOINTMENT (OUTPATIENT)
Dept: GENERAL RADIOLOGY | Facility: HOSPITAL | Age: 52
End: 2022-10-25
Attending: EMERGENCY MEDICINE
Payer: MEDICAID

## 2022-10-25 DIAGNOSIS — A41.9 SEPSIS DUE TO URINARY TRACT INFECTION (HCC): Primary | ICD-10-CM

## 2022-10-25 DIAGNOSIS — R73.9 HYPERGLYCEMIA: ICD-10-CM

## 2022-10-25 DIAGNOSIS — R79.89 AZOTEMIA: ICD-10-CM

## 2022-10-25 DIAGNOSIS — N39.0 SEPSIS DUE TO URINARY TRACT INFECTION (HCC): Primary | ICD-10-CM

## 2022-10-25 DIAGNOSIS — E83.52 HYPERCALCEMIA: ICD-10-CM

## 2022-10-25 PROBLEM — D64.9 ANEMIA: Status: ACTIVE | Noted: 2022-10-25

## 2022-10-25 LAB
ALBUMIN SERPL-MCNC: 2.5 G/DL (ref 3.4–5)
ALBUMIN/GLOB SERPL: 0.4 {RATIO} (ref 1–2)
ALP LIVER SERPL-CCNC: 106 U/L
ALT SERPL-CCNC: 37 U/L
ANION GAP SERPL CALC-SCNC: 9 MMOL/L (ref 0–18)
AST SERPL-CCNC: 36 U/L (ref 15–37)
ATRIAL RATE: 142 BPM
BASOPHILS # BLD AUTO: 0.05 X10(3) UL (ref 0–0.2)
BASOPHILS NFR BLD AUTO: 0.4 %
BILIRUB SERPL-MCNC: 0.3 MG/DL (ref 0.1–2)
BILIRUB UR QL STRIP.AUTO: NEGATIVE
BUN BLD-MCNC: 48 MG/DL (ref 7–18)
CALCIUM BLD-MCNC: 10.8 MG/DL (ref 8.5–10.1)
CHLORIDE SERPL-SCNC: 105 MMOL/L (ref 98–112)
CO2 SERPL-SCNC: 25 MMOL/L (ref 21–32)
COLOR UR AUTO: YELLOW
CREAT BLD-MCNC: 1.28 MG/DL
EOSINOPHIL # BLD AUTO: 0.12 X10(3) UL (ref 0–0.7)
EOSINOPHIL NFR BLD AUTO: 0.8 %
ERYTHROCYTE [DISTWIDTH] IN BLOOD BY AUTOMATED COUNT: 13.8 %
GFR SERPLBLD BASED ON 1.73 SQ M-ARVRAT: 67 ML/MIN/1.73M2 (ref 60–?)
GLOBULIN PLAS-MCNC: 5.8 G/DL (ref 2.8–4.4)
GLUCOSE BLD-MCNC: 127 MG/DL (ref 70–99)
GLUCOSE BLD-MCNC: 134 MG/DL (ref 70–99)
GLUCOSE UR STRIP.AUTO-MCNC: NEGATIVE MG/DL
HCT VFR BLD AUTO: 28.8 %
HGB BLD-MCNC: 9.3 G/DL
IMM GRANULOCYTES # BLD AUTO: 0.13 X10(3) UL (ref 0–1)
IMM GRANULOCYTES NFR BLD: 0.9 %
KETONES UR STRIP.AUTO-MCNC: NEGATIVE MG/DL
LACTATE SERPL-SCNC: 1.5 MMOL/L (ref 0.4–2)
LYMPHOCYTES # BLD AUTO: 1.5 X10(3) UL (ref 1–4)
LYMPHOCYTES NFR BLD AUTO: 10.6 %
MCH RBC QN AUTO: 28.6 PG (ref 26–34)
MCHC RBC AUTO-ENTMCNC: 32.3 G/DL (ref 31–37)
MCV RBC AUTO: 88.6 FL
MONOCYTES # BLD AUTO: 1.24 X10(3) UL (ref 0.1–1)
MONOCYTES NFR BLD AUTO: 8.8 %
NEUTROPHILS # BLD AUTO: 11.13 X10 (3) UL (ref 1.5–7.7)
NEUTROPHILS # BLD AUTO: 11.13 X10(3) UL (ref 1.5–7.7)
NEUTROPHILS NFR BLD AUTO: 78.5 %
NITRITE UR QL STRIP.AUTO: NEGATIVE
OSMOLALITY SERPL CALC.SUM OF ELEC: 303 MOSM/KG (ref 275–295)
P AXIS: 40 DEGREES
P-R INTERVAL: 130 MS
PH UR STRIP.AUTO: 8 [PH] (ref 5–8)
PLATELET # BLD AUTO: 508 10(3)UL (ref 150–450)
POTASSIUM SERPL-SCNC: 3.9 MMOL/L (ref 3.5–5.1)
PROT SERPL-MCNC: 8.3 G/DL (ref 6.4–8.2)
PROT UR STRIP.AUTO-MCNC: 100 MG/DL
Q-T INTERVAL: 288 MS
QRS DURATION: 82 MS
QTC CALCULATION (BEZET): 443 MS
R AXIS: 9 DEGREES
RBC # BLD AUTO: 3.25 X10(6)UL
RBC UR QL AUTO: NEGATIVE
SARS-COV-2 RNA RESP QL NAA+PROBE: NOT DETECTED
SODIUM SERPL-SCNC: 139 MMOL/L (ref 136–145)
SP GR UR STRIP.AUTO: 1.01 (ref 1–1.03)
T AXIS: 17 DEGREES
UROBILINOGEN UR STRIP.AUTO-MCNC: 2 MG/DL
VENTRICULAR RATE: 142 BPM
WBC # BLD AUTO: 14.2 X10(3) UL (ref 4–11)
WBC #/AREA URNS AUTO: >50 /HPF
WBC CLUMPS UR QL AUTO: PRESENT /HPF

## 2022-10-25 PROCEDURE — 96365 THER/PROPH/DIAG IV INF INIT: CPT

## 2022-10-25 PROCEDURE — 87040 BLOOD CULTURE FOR BACTERIA: CPT | Performed by: EMERGENCY MEDICINE

## 2022-10-25 PROCEDURE — 36415 COLL VENOUS BLD VENIPUNCTURE: CPT

## 2022-10-25 PROCEDURE — 74177 CT ABD & PELVIS W/CONTRAST: CPT | Performed by: EMERGENCY MEDICINE

## 2022-10-25 PROCEDURE — 83605 ASSAY OF LACTIC ACID: CPT | Performed by: EMERGENCY MEDICINE

## 2022-10-25 PROCEDURE — 87186 SC STD MICRODIL/AGAR DIL: CPT | Performed by: EMERGENCY MEDICINE

## 2022-10-25 PROCEDURE — 85025 COMPLETE CBC W/AUTO DIFF WBC: CPT | Performed by: EMERGENCY MEDICINE

## 2022-10-25 PROCEDURE — 87077 CULTURE AEROBIC IDENTIFY: CPT | Performed by: EMERGENCY MEDICINE

## 2022-10-25 PROCEDURE — 81001 URINALYSIS AUTO W/SCOPE: CPT | Performed by: EMERGENCY MEDICINE

## 2022-10-25 PROCEDURE — 80053 COMPREHEN METABOLIC PANEL: CPT | Performed by: EMERGENCY MEDICINE

## 2022-10-25 PROCEDURE — 99291 CRITICAL CARE FIRST HOUR: CPT

## 2022-10-25 PROCEDURE — 93010 ELECTROCARDIOGRAM REPORT: CPT

## 2022-10-25 PROCEDURE — 51702 INSERT TEMP BLADDER CATH: CPT

## 2022-10-25 PROCEDURE — 71045 X-RAY EXAM CHEST 1 VIEW: CPT | Performed by: EMERGENCY MEDICINE

## 2022-10-25 PROCEDURE — 96361 HYDRATE IV INFUSION ADD-ON: CPT

## 2022-10-25 PROCEDURE — 0T2BX0Z CHANGE DRAINAGE DEVICE IN BLADDER, EXTERNAL APPROACH: ICD-10-PCS | Performed by: EMERGENCY MEDICINE

## 2022-10-25 PROCEDURE — 87086 URINE CULTURE/COLONY COUNT: CPT | Performed by: EMERGENCY MEDICINE

## 2022-10-25 PROCEDURE — 93005 ELECTROCARDIOGRAM TRACING: CPT

## 2022-10-25 PROCEDURE — 82962 GLUCOSE BLOOD TEST: CPT

## 2022-10-25 RX ORDER — ONDANSETRON 4 MG/1
4 TABLET, FILM COATED ORAL EVERY 8 HOURS PRN
COMMUNITY
Start: 2022-10-20

## 2022-10-25 RX ORDER — AMOXICILLIN AND CLAVULANATE POTASSIUM 875; 125 MG/1; MG/1
1 TABLET, FILM COATED ORAL 2 TIMES DAILY
COMMUNITY
Start: 2022-10-24 | End: 2022-10-29

## 2022-10-25 RX ORDER — IOHEXOL 350 MG/ML
100 INJECTION, SOLUTION INTRAVENOUS
Status: COMPLETED | OUTPATIENT
Start: 2022-10-25 | End: 2022-10-25

## 2022-10-25 RX ORDER — HYDRALAZINE HYDROCHLORIDE 50 MG/1
50 TABLET, FILM COATED ORAL EVERY 6 HOURS PRN
COMMUNITY

## 2022-10-25 RX ORDER — AMLODIPINE BESYLATE 5 MG/1
10 TABLET ORAL DAILY
Status: DISCONTINUED | OUTPATIENT
Start: 2022-10-26 | End: 2022-10-29

## 2022-10-25 RX ORDER — CLONIDINE HYDROCHLORIDE 0.1 MG/1
0.3 TABLET ORAL 2 TIMES DAILY
Status: DISCONTINUED | OUTPATIENT
Start: 2022-10-25 | End: 2022-10-29

## 2022-10-25 RX ORDER — ACETAMINOPHEN 160 MG/5ML
650 SOLUTION ORAL EVERY 6 HOURS PRN
Status: DISCONTINUED | OUTPATIENT
Start: 2022-10-25 | End: 2022-10-29

## 2022-10-25 RX ORDER — TERAZOSIN 10 MG/1
10 CAPSULE ORAL NIGHTLY
Status: DISCONTINUED | OUTPATIENT
Start: 2022-10-25 | End: 2022-10-29

## 2022-10-25 RX ORDER — SODIUM CHLORIDE 9 MG/ML
INJECTION, SOLUTION INTRAVENOUS CONTINUOUS
Status: DISCONTINUED | OUTPATIENT
Start: 2022-10-25 | End: 2022-10-26

## 2022-10-25 RX ORDER — HEPARIN SODIUM 5000 [USP'U]/ML
5000 INJECTION, SOLUTION INTRAVENOUS; SUBCUTANEOUS EVERY 8 HOURS SCHEDULED
Status: DISCONTINUED | OUTPATIENT
Start: 2022-10-25 | End: 2022-10-29

## 2022-10-25 RX ORDER — SODIUM BICARBONATE 325 MG/1
325 TABLET ORAL AS NEEDED
Status: DISCONTINUED | OUTPATIENT
Start: 2022-10-25 | End: 2022-10-29

## 2022-10-25 NOTE — ED QUICK NOTES
Pt brought back from CT with staff in CT stating pt was \"combative\". Pt remains awake and alert to his norm, skin w/d,resps reg/shallow. Pt does not appear in pain. Pt's mouth suctioned of small amount sputum. Pt's fiancee at bedside.

## 2022-10-25 NOTE — ED QUICK NOTES
With elevated HR, attempt to reposition pt for comfort. Tina roll placed behind left side, pt attempting to turn on right side.

## 2022-10-25 NOTE — ED QUICK NOTES
Per CT,  Pt with distended bladder. Kirkland in place but no urine draining at this time as it had been earlier. Concern there may be some blockage in catheter so discussed with dr. Cheng and will remove and replace.  Pt currently has a 16 f latex catheter with a 10 ml balloon

## 2022-10-25 NOTE — PLAN OF CARE
Admitted from ED around 1445. HR elevated, given home metoprolol dose with improvement. Low grade fever, will monitor. Aphasic, R sided flaccid with R facial droop. PEG tube from OSH, verified placement with CT. Tube feedings ordered, awaiting pump. IVF infusing, antibiotic given as ordered. Kirkland intact, draining. + BM on admission. Sacral wound on admission, documented in flowsheets.

## 2022-10-25 NOTE — ED QUICK NOTES
Pt status unchanged. Pt made aware transport will be here soon.  Pt's mouth suctioned of scant amount of sputum per pt request.

## 2022-10-25 NOTE — ED INITIAL ASSESSMENT (HPI)
Pt presents to the ED via EMS from [facility] with staff complaints of blood pressure 164/150. Medics arrived and found pt was tachycardic, -160. Glucose wnl. Pt with hx of stroke and residual weakness. Per ems, pt is aphasic due to stroke. Pt lying on cart with eyes open, skin hot and dry, resps reg/shallow at a rate of 50.

## 2022-10-25 NOTE — PROGRESS NOTES
Allergy List Review by Pharmacy    Reviewed allergy history with Patient. While unable to speak, but was able to nod to confirm he does not have an allergy to penicillins, and again nodded to confirm he has no known drug allergies. This is consistent with his medication history.     Lazarus Larry, PharmD  10/25/22, 12:46 PM

## 2022-10-25 NOTE — ED QUICK NOTES
Pt lying on cart with eyes closed, skin hot and dry, resps reg/shallow. Pt appears anxious and is not reporting any pain. Kirkland draining. Pt repositioned on cart for comfort. Per brother, he believed pt was started on cipro yesterday and kenya at bedside reports pt is allergic to penicillin, although paperwork does not reflect and brother was unaware.

## 2022-10-25 NOTE — DIETARY NOTE
Clinical Nutrition    RD received consult for TF. Per NH recs pt receives Jevity 1.5 350ml bolus 4x daily with 450ml h20 flush 4x + 2 pkts prostat daily. While in hospital, recommend Jevity 1.5 @ 60ml/hr x 24h + 2 pkt Prosource daily. 250 FWF q4. This provides 2340kcals, 122g pro, 2594ml 250 and 100% RDIs. RD will follow up in AM for full assessment.     Parvin Colindres RD, LDN, [address]   Clinical Dietitian  Phone [phone]

## 2022-10-25 NOTE — ED QUICK NOTES
Orders for admission, patient is aware of plan and ready to go upstairs. Any questions, please call ED CHAD Marr  at extension [phone]. Vaccinated? yes  Type of COVID test sent:rapid  COVID Suspicion level: Low/High  low    Titratable drug(s) infusing:  Rate:NS sepsis bolus infusing, 2994 ml total. Pt is currently on 2nd liter. Pt received zosyn, but was told by kenya after that pt has penicillin allergy with rash. No reaction noted. Allergies updated    LOC at time of transport:  Alert to his norm. Pt has expressive aphasia from stroke but is able to nod yes and no to questions asked.      Other pertinent information:    CIWA score=n/a  NIH score=n/a

## 2022-10-25 NOTE — ED QUICK NOTES
Pt appears comfortable on cart, eyes closed and in no distress. Pt's Carlos Kamari at bedside and shown paperwork from NH which does not show any allergies and also shows that pt appears to be on Augmentin 875-125 that appears to have been started yesterday. No order for cipro noted in paperwork from NH.

## 2022-10-25 NOTE — ED QUICK NOTES
Pt repositioned on cart for comfort. Pt cleansed of large amount formed green/brown stool. Pt noted to have sacral decub, area red. No obvious drainage noted. Pt noted to have heel elevators on and pt with brace that is at the bedside. Pt arrived to ED with these items.

## 2022-10-26 LAB
ALBUMIN SERPL-MCNC: 2 G/DL (ref 3.4–5)
ALBUMIN/GLOB SERPL: 0.4 {RATIO} (ref 1–2)
ALP LIVER SERPL-CCNC: 93 U/L
ALT SERPL-CCNC: 34 U/L
ANION GAP SERPL CALC-SCNC: 7 MMOL/L (ref 0–18)
AST SERPL-CCNC: 23 U/L (ref 15–37)
BILIRUB SERPL-MCNC: 0.2 MG/DL (ref 0.1–2)
BUN BLD-MCNC: 43 MG/DL (ref 7–18)
CALCIUM BLD-MCNC: 9 MG/DL (ref 8.5–10.1)
CHLORIDE SERPL-SCNC: 114 MMOL/L (ref 98–112)
CO2 SERPL-SCNC: 22 MMOL/L (ref 21–32)
CREAT BLD-MCNC: 1.18 MG/DL
ERYTHROCYTE [DISTWIDTH] IN BLOOD BY AUTOMATED COUNT: 14.3 %
GFR SERPLBLD BASED ON 1.73 SQ M-ARVRAT: 73 ML/MIN/1.73M2 (ref 60–?)
GLOBULIN PLAS-MCNC: 4.6 G/DL (ref 2.8–4.4)
GLUCOSE BLD-MCNC: 136 MG/DL (ref 70–99)
GLUCOSE BLD-MCNC: 140 MG/DL (ref 70–99)
GLUCOSE BLD-MCNC: 142 MG/DL (ref 70–99)
GLUCOSE BLD-MCNC: 156 MG/DL (ref 70–99)
HCT VFR BLD AUTO: 25.6 %
HGB BLD-MCNC: 7.9 G/DL
MAGNESIUM SERPL-MCNC: 2.2 MG/DL (ref 1.6–2.6)
MCH RBC QN AUTO: 28.4 PG (ref 26–34)
MCHC RBC AUTO-ENTMCNC: 30.9 G/DL (ref 31–37)
MCV RBC AUTO: 92.1 FL
OSMOLALITY SERPL CALC.SUM OF ELEC: 309 MOSM/KG (ref 275–295)
PHOSPHATE SERPL-MCNC: 3.4 MG/DL (ref 2.5–4.9)
PLATELET # BLD AUTO: 445 10(3)UL (ref 150–450)
POTASSIUM SERPL-SCNC: 3.7 MMOL/L (ref 3.5–5.1)
PROT SERPL-MCNC: 6.6 G/DL (ref 6.4–8.2)
RBC # BLD AUTO: 2.78 X10(6)UL
SODIUM SERPL-SCNC: 143 MMOL/L (ref 136–145)
WBC # BLD AUTO: 10.7 X10(3) UL (ref 4–11)

## 2022-10-26 PROCEDURE — 82962 GLUCOSE BLOOD TEST: CPT

## 2022-10-26 PROCEDURE — 97110 THERAPEUTIC EXERCISES: CPT

## 2022-10-26 PROCEDURE — 83735 ASSAY OF MAGNESIUM: CPT | Performed by: HOSPITALIST

## 2022-10-26 PROCEDURE — 97535 SELF CARE MNGMENT TRAINING: CPT

## 2022-10-26 PROCEDURE — 84100 ASSAY OF PHOSPHORUS: CPT

## 2022-10-26 PROCEDURE — 97162 PT EVAL MOD COMPLEX 30 MIN: CPT

## 2022-10-26 PROCEDURE — 92610 EVALUATE SWALLOWING FUNCTION: CPT

## 2022-10-26 PROCEDURE — 85027 COMPLETE CBC AUTOMATED: CPT | Performed by: HOSPITALIST

## 2022-10-26 PROCEDURE — 92526 ORAL FUNCTION THERAPY: CPT

## 2022-10-26 PROCEDURE — 80053 COMPREHEN METABOLIC PANEL: CPT | Performed by: HOSPITALIST

## 2022-10-26 PROCEDURE — 97165 OT EVAL LOW COMPLEX 30 MIN: CPT

## 2022-10-26 RX ORDER — SODIUM CHLORIDE 9 MG/ML
INJECTION, SOLUTION INTRAVENOUS CONTINUOUS
Status: DISCONTINUED | OUTPATIENT
Start: 2022-10-26 | End: 2022-10-26

## 2022-10-26 NOTE — PLAN OF CARE
Assumed patient care for AM shift:  - Patient with history of CVA from [facility]  - Right flaccid, Left with minimal movement, and Expressive aphasia  - Patient will nod and attempt to answer questions  - G tube, feedings are running at goal per current dietician recommendations  - IVF infusing as well  - Sepsis d/t UTI is admitting diagnosis, IV antibiotics   - Kirkland was changed in ED  - PT/OT and SLP to greg  - was told that he was trialing a diet at [facility] prior to arrival  - Q2H turns

## 2022-10-26 NOTE — PROGRESS NOTES
Assumed care at [facility] to assess orientation as patient would not answer questions. On 2L N/C. Weaned down from 3L  NSR/ST on tele. Tube feedings Jevity 1.5 started at goal rate of 60 ml/hr and tolerating well. Assisted with turning and repositioning in bed. Indwelling castillo in place-draining clear, yellow urine.

## 2022-10-26 NOTE — CM/SW NOTE
Patient is from [address] [place] [address] prior to admission. CM sent clinical updates to facility via [employer] system. SW/CM to remain available for any further discharge planning needs.    Reanna Lr RN Case Manager [phone]

## 2022-10-27 ENCOUNTER — APPOINTMENT (OUTPATIENT)
Dept: GENERAL RADIOLOGY | Facility: HOSPITAL | Age: 52
End: 2022-10-27
Attending: HOSPITALIST
Payer: MEDICAID

## 2022-10-27 LAB
GLUCOSE BLD-MCNC: 127 MG/DL (ref 70–99)
GLUCOSE BLD-MCNC: 130 MG/DL (ref 70–99)
GLUCOSE BLD-MCNC: 136 MG/DL (ref 70–99)

## 2022-10-27 PROCEDURE — 92611 MOTION FLUOROSCOPY/SWALLOW: CPT

## 2022-10-27 PROCEDURE — 74230 X-RAY XM SWLNG FUNCJ C+: CPT | Performed by: HOSPITALIST

## 2022-10-27 PROCEDURE — 82962 GLUCOSE BLOOD TEST: CPT

## 2022-10-27 PROCEDURE — 92526 ORAL FUNCTION THERAPY: CPT

## 2022-10-27 RX ORDER — LISINOPRIL 40 MG/1
40 TABLET ORAL DAILY
Status: DISCONTINUED | OUTPATIENT
Start: 2022-10-27 | End: 2022-10-29

## 2022-10-27 NOTE — DIETARY NOTE
Clinical Nutrition    RD received notification of formula shortage. If Jevity 1.5 unavailable, ok to substitute Osmolite 1.5 at same rate. OK to resume Jevity 1.5 once available.     Anya Cuello RD, LDN, [address]   Clinical Dietitian  Phone [phone]

## 2022-10-27 NOTE — PLAN OF CARE
Assumed care at [address]. Alert and oriented x 1. Telemetry monitor reading SR. TF infusing at goal. No pain noted. Tq2hrs. Call light in reach at bedside. Will continue to monitor.        Problem: PAIN - ADULT  Goal: Verbalizes/displays adequate comfort level or patient's stated pain goal  Description: INTERVENTIONS:  - Encourage pt to monitor pain and request assistance  - Assess pain using appropriate pain scale  - Administer analgesics based on type and severity of pain and evaluate response  - Implement non-pharmacological measures as appropriate and evaluate response  - Consider cultural and social influences on pain and pain management  - Manage/alleviate anxiety  - Utilize distraction and/or relaxation techniques  - Monitor for opioid side effects  - Notify MD/LIP if interventions unsuccessful or patient reports new pain  - Anticipate increased pain with activity and pre-medicate as appropriate  Outcome: Progressing

## 2022-10-27 NOTE — CM/SW NOTE
MSW spoke to pt's brother Fadi Saleem who confirmed plan for pt to return to [address] [place] [address] at [facility]. BLS on will call for 10/28 but no dc order in yet.      [address] [place]  [phone]

## 2022-10-28 ENCOUNTER — APPOINTMENT (OUTPATIENT)
Dept: ULTRASOUND IMAGING | Facility: HOSPITAL | Age: 52
End: 2022-10-28
Attending: INTERNAL MEDICINE
Payer: MEDICAID

## 2022-10-28 LAB
ANION GAP SERPL CALC-SCNC: 8 MMOL/L (ref 0–18)
BASOPHILS # BLD AUTO: 0.05 X10(3) UL (ref 0–0.2)
BASOPHILS NFR BLD AUTO: 0.6 %
BUN BLD-MCNC: 21 MG/DL (ref 7–18)
CALCIUM BLD-MCNC: 10.2 MG/DL (ref 8.5–10.1)
CHLORIDE SERPL-SCNC: 107 MMOL/L (ref 98–112)
CO2 SERPL-SCNC: 25 MMOL/L (ref 21–32)
CREAT BLD-MCNC: 0.92 MG/DL
EOSINOPHIL # BLD AUTO: 0.45 X10(3) UL (ref 0–0.7)
EOSINOPHIL NFR BLD AUTO: 5.4 %
ERYTHROCYTE [DISTWIDTH] IN BLOOD BY AUTOMATED COUNT: 13.3 %
GFR SERPLBLD BASED ON 1.73 SQ M-ARVRAT: 99 ML/MIN/1.73M2 (ref 60–?)
GLUCOSE BLD-MCNC: 111 MG/DL (ref 70–99)
GLUCOSE BLD-MCNC: 121 MG/DL (ref 70–99)
GLUCOSE BLD-MCNC: 122 MG/DL (ref 70–99)
GLUCOSE BLD-MCNC: 126 MG/DL (ref 70–99)
GLUCOSE BLD-MCNC: 158 MG/DL (ref 70–99)
HCT VFR BLD AUTO: 26.8 %
HGB BLD-MCNC: 8.6 G/DL
IMM GRANULOCYTES # BLD AUTO: 0.04 X10(3) UL (ref 0–1)
IMM GRANULOCYTES NFR BLD: 0.5 %
LYMPHOCYTES # BLD AUTO: 1.68 X10(3) UL (ref 1–4)
LYMPHOCYTES NFR BLD AUTO: 20.2 %
MCH RBC QN AUTO: 28.8 PG (ref 26–34)
MCHC RBC AUTO-ENTMCNC: 32.1 G/DL (ref 31–37)
MCV RBC AUTO: 89.6 FL
MONOCYTES # BLD AUTO: 0.56 X10(3) UL (ref 0.1–1)
MONOCYTES NFR BLD AUTO: 6.7 %
NEUTROPHILS # BLD AUTO: 5.55 X10 (3) UL (ref 1.5–7.7)
NEUTROPHILS # BLD AUTO: 5.55 X10(3) UL (ref 1.5–7.7)
NEUTROPHILS NFR BLD AUTO: 66.6 %
OSMOLALITY SERPL CALC.SUM OF ELEC: 294 MOSM/KG (ref 275–295)
PLATELET # BLD AUTO: 511 10(3)UL (ref 150–450)
POTASSIUM SERPL-SCNC: 3.7 MMOL/L (ref 3.5–5.1)
RBC # BLD AUTO: 2.99 X10(6)UL
SODIUM SERPL-SCNC: 140 MMOL/L (ref 136–145)
WBC # BLD AUTO: 8.3 X10(3) UL (ref 4–11)

## 2022-10-28 PROCEDURE — 80048 BASIC METABOLIC PNL TOTAL CA: CPT | Performed by: HOSPITALIST

## 2022-10-28 PROCEDURE — 85025 COMPLETE CBC W/AUTO DIFF WBC: CPT | Performed by: HOSPITALIST

## 2022-10-28 PROCEDURE — 76770 US EXAM ABDO BACK WALL COMP: CPT | Performed by: INTERNAL MEDICINE

## 2022-10-28 PROCEDURE — 92526 ORAL FUNCTION THERAPY: CPT

## 2022-10-28 PROCEDURE — 82962 GLUCOSE BLOOD TEST: CPT

## 2022-10-28 RX ORDER — CEFPODOXIME PROXETIL 200 MG/1
400 TABLET, FILM COATED ORAL 2 TIMES DAILY
Qty: 28 TABLET | Refills: 0 | Status: SHIPPED | OUTPATIENT
Start: 2022-10-28 | End: 2022-11-04

## 2022-10-28 RX ORDER — HYDRALAZINE HYDROCHLORIDE 50 MG/1
50 TABLET, FILM COATED ORAL EVERY 8 HOURS SCHEDULED
Status: DISCONTINUED | OUTPATIENT
Start: 2022-10-28 | End: 2022-10-29

## 2022-10-28 RX ORDER — POTASSIUM CHLORIDE 14.9 MG/ML
20 INJECTION INTRAVENOUS ONCE
Status: COMPLETED | OUTPATIENT
Start: 2022-10-28 | End: 2022-10-28

## 2022-10-28 NOTE — CM/SW NOTE
MSW spoke to Braulio at [facility] who can accept pt. Rn asked for dc to be arranged. [facility]. MSW called Pt's brother and he is agreeable to sat dc.       DC PLAN: [address]: SAT at [facility]

## 2022-10-28 NOTE — SLP NOTE
SPEECH DAILY NOTE - INPATIENT    ASSESSMENT & PLAN   ASSESSMENT  Patient seen for skilled dysphagia therapy targeting oral and pharyngeal musculature exercises to help strengthen and coordinate the muscles involved in swallowing. Patient found lying semi-upright in bed; alert and participatory. Positioned pt upright & midline; head of bed elevated to 90 degrees. Oral suctioning was provided prior to oral care. Oral care performed. Pt completed oral exercises of lingual protrusion with resistance and lingual elevation with resistance x4 with max verbal, visual, and tactile cues. Pt politely declined to continue any further oral and pharyngeal exercises. Clinician presented therapeutic po trials of puree via spoon x5 and moderately thick liquids via spoon x5 with utilization of bolus hold and effortful swallow. Pt utilized bolus hold and effortful swallow with max verbal and visual cues throughout all po trials. Clear vocal quality noted. No overt clinical s/s of aspiration observed. Pt participated well and was responsive to max cues provided throughout the session. Will follow up to facilitate treatment of oral and pharyngeal musculature exercises, ongoing assessment of tolerance of therapeutic po trials, and facilitation of compensatory strategies/swallow precautions. Will continue to follow-up as per plan. Diet Recommendations - Solids: NPO  Diet Recommendations - Liquids: NPO   Oral Hygiene should be performed 3x daily to reduce oral pathogens and to minimize the potential development of pneumonia. Medication Administration Recommendations: Non-oral    Patient Experiencing Pain:  (No pain behaviors observed or reported by pt.)                Discharge Recommendations  Discharge Recommendations/Plan: Undetermined    Treatment Plan  Treatment Plan/Recommendations: Dysphagia therapy    Interdisciplinary Communication: Discussed with RN            GOALS  Goal #1 Pt will participate in VFSS.   MET Goal #2 The patient will complete oral and pharyngeal musculature exercises with max visual/verbal and tactile cues within 4 session. Effortful Swallow x10  Lingual protrusion with resistance x10  Lingual elevation with resistance x10  In progress   Goal #3 Patient will safely ingest diet trials of puree and honey thick liquids during therapeutic feedings with use of bolus hold and effortful swallow with 70% accuracy across 4 sessions. In progress   Goal #4 The patient/family/caregiver will demonstrate understanding and implementation of aspiration precautions and swallow strategies independently over 1-2 session(s). In progress         FOLLOW UP  Follow Up Needed (Documentation Required): Yes  SLP Follow-up Date: 10/29/22  Number of Visits to Meet Established Goals: 3    Session: 1/3    If you have any questions, please contact Jeannette Rinaldi or [employer], MA, CCC-SLP.     PRADIP Patterson, Graduate SLP Clinician

## 2022-10-29 VITALS
HEIGHT: 71 IN | DIASTOLIC BLOOD PRESSURE: 79 MMHG | OXYGEN SATURATION: 100 % | WEIGHT: 213.13 LBS | HEART RATE: 91 BPM | BODY MASS INDEX: 29.84 KG/M2 | SYSTOLIC BLOOD PRESSURE: 152 MMHG | RESPIRATION RATE: 18 BRPM | TEMPERATURE: 98 F

## 2022-10-29 LAB
ANION GAP SERPL CALC-SCNC: 7 MMOL/L (ref 0–18)
BUN BLD-MCNC: 20 MG/DL (ref 7–18)
CALCIUM BLD-MCNC: 9.8 MG/DL (ref 8.5–10.1)
CHLORIDE SERPL-SCNC: 107 MMOL/L (ref 98–112)
CO2 SERPL-SCNC: 25 MMOL/L (ref 21–32)
CREAT BLD-MCNC: 0.85 MG/DL
GFR SERPLBLD BASED ON 1.73 SQ M-ARVRAT: 103 ML/MIN/1.73M2 (ref 60–?)
GLUCOSE BLD-MCNC: 100 MG/DL (ref 70–99)
GLUCOSE BLD-MCNC: 154 MG/DL (ref 70–99)
GLUCOSE BLD-MCNC: 94 MG/DL (ref 70–99)
OSMOLALITY SERPL CALC.SUM OF ELEC: 291 MOSM/KG (ref 275–295)
POTASSIUM SERPL-SCNC: 4.1 MMOL/L (ref 3.5–5.1)
POTASSIUM SERPL-SCNC: 4.1 MMOL/L (ref 3.5–5.1)
SODIUM SERPL-SCNC: 139 MMOL/L (ref 136–145)

## 2022-10-29 PROCEDURE — 80048 BASIC METABOLIC PNL TOTAL CA: CPT | Performed by: HOSPITALIST

## 2022-10-29 PROCEDURE — 84132 ASSAY OF SERUM POTASSIUM: CPT | Performed by: HOSPITALIST

## 2022-10-29 PROCEDURE — 82962 GLUCOSE BLOOD TEST: CPT

## 2022-10-29 NOTE — DISCHARGE PLANNING
NURSING DISCHARGE NOTE    Discharged Nursing home via Ambulance. Accompanied by Support staff  Belongings Taken by patient/family. Pt was discharged to [facility]. Report given to Aiyana Murray RN, all questions answered. IV removed. Pt left with castillo, stat lock in place. PEG tube in place. Boots taken. Script given to ambulance staff. Pt was updated.

## 2022-11-29 ENCOUNTER — APPOINTMENT (OUTPATIENT)
Dept: CT IMAGING | Facility: HOSPITAL | Age: 52
End: 2022-11-29
Attending: EMERGENCY MEDICINE
Payer: MEDICAID

## 2022-11-29 ENCOUNTER — HOSPITAL ENCOUNTER (INPATIENT)
Facility: HOSPITAL | Age: 52
LOS: 3 days | Discharge: NURSING, REHAB OR LONG-TERM CARE | End: 2022-12-03
Attending: EMERGENCY MEDICINE | Admitting: HOSPITALIST
Payer: MEDICAID

## 2022-11-29 ENCOUNTER — APPOINTMENT (OUTPATIENT)
Dept: GENERAL RADIOLOGY | Facility: HOSPITAL | Age: 52
End: 2022-11-29
Attending: EMERGENCY MEDICINE
Payer: MEDICAID

## 2022-11-29 DIAGNOSIS — A41.9 SEPSIS DUE TO URINARY TRACT INFECTION (HCC): Primary | ICD-10-CM

## 2022-11-29 DIAGNOSIS — I10 UNCONTROLLED HYPERTENSION: ICD-10-CM

## 2022-11-29 DIAGNOSIS — R50.9 FEVER, UNSPECIFIED FEVER CAUSE: ICD-10-CM

## 2022-11-29 DIAGNOSIS — N39.0 SEPSIS DUE TO URINARY TRACT INFECTION (HCC): Primary | ICD-10-CM

## 2022-11-29 PROBLEM — E87.6 HYPOKALEMIA: Status: ACTIVE | Noted: 2022-11-29

## 2022-11-29 LAB
ALBUMIN SERPL-MCNC: 3.4 G/DL (ref 3.4–5)
ALBUMIN/GLOB SERPL: 0.7 {RATIO} (ref 1–2)
ALP LIVER SERPL-CCNC: 124 U/L
ALT SERPL-CCNC: 38 U/L
ANION GAP SERPL CALC-SCNC: 7 MMOL/L (ref 0–18)
AST SERPL-CCNC: 21 U/L (ref 15–37)
BASOPHILS # BLD AUTO: 0.03 X10(3) UL (ref 0–0.2)
BASOPHILS NFR BLD AUTO: 0.3 %
BILIRUB SERPL-MCNC: 0.2 MG/DL (ref 0.1–2)
BILIRUB UR QL STRIP.AUTO: NEGATIVE
BUN BLD-MCNC: 26 MG/DL (ref 7–18)
CALCIUM BLD-MCNC: 10.7 MG/DL (ref 8.5–10.1)
CHLORIDE SERPL-SCNC: 106 MMOL/L (ref 98–112)
CLARITY UR REFRACT.AUTO: CLEAR
CO2 SERPL-SCNC: 28 MMOL/L (ref 21–32)
COLOR UR AUTO: YELLOW
CREAT BLD-MCNC: 1.11 MG/DL
EOSINOPHIL # BLD AUTO: 0.04 X10(3) UL (ref 0–0.7)
EOSINOPHIL NFR BLD AUTO: 0.4 %
ERYTHROCYTE [DISTWIDTH] IN BLOOD BY AUTOMATED COUNT: 14.4 %
FLUAV + FLUBV RNA SPEC NAA+PROBE: NEGATIVE
FLUAV + FLUBV RNA SPEC NAA+PROBE: NEGATIVE
GFR SERPLBLD BASED ON 1.73 SQ M-ARVRAT: 79 ML/MIN/1.73M2 (ref 60–?)
GLOBULIN PLAS-MCNC: 5.2 G/DL (ref 2.8–4.4)
GLUCOSE BLD-MCNC: 125 MG/DL (ref 70–99)
GLUCOSE UR STRIP.AUTO-MCNC: NEGATIVE MG/DL
HCT VFR BLD AUTO: 37.1 %
HGB BLD-MCNC: 12.2 G/DL
IMM GRANULOCYTES # BLD AUTO: 0.05 X10(3) UL (ref 0–1)
IMM GRANULOCYTES NFR BLD: 0.5 %
KETONES UR STRIP.AUTO-MCNC: NEGATIVE MG/DL
LACTATE SERPL-SCNC: 1.5 MMOL/L (ref 0.4–2)
LEUKOCYTE ESTERASE UR QL STRIP.AUTO: NEGATIVE
LYMPHOCYTES # BLD AUTO: 0.85 X10(3) UL (ref 1–4)
LYMPHOCYTES NFR BLD AUTO: 8.2 %
MCH RBC QN AUTO: 29.3 PG (ref 26–34)
MCHC RBC AUTO-ENTMCNC: 32.9 G/DL (ref 31–37)
MCV RBC AUTO: 89.2 FL
MONOCYTES # BLD AUTO: 0.57 X10(3) UL (ref 0.1–1)
MONOCYTES NFR BLD AUTO: 5.5 %
NEUTROPHILS # BLD AUTO: 8.88 X10 (3) UL (ref 1.5–7.7)
NEUTROPHILS # BLD AUTO: 8.88 X10(3) UL (ref 1.5–7.7)
NEUTROPHILS NFR BLD AUTO: 85.1 %
NITRITE UR QL STRIP.AUTO: NEGATIVE
OSMOLALITY SERPL CALC.SUM OF ELEC: 298 MOSM/KG (ref 275–295)
PH UR STRIP.AUTO: 7 [PH] (ref 5–8)
PLATELET # BLD AUTO: 455 10(3)UL (ref 150–450)
POTASSIUM SERPL-SCNC: 3.4 MMOL/L (ref 3.5–5.1)
PROT SERPL-MCNC: 8.6 G/DL (ref 6.4–8.2)
PROT UR STRIP.AUTO-MCNC: >=300 MG/DL
RBC # BLD AUTO: 4.16 X10(6)UL
RBC #/AREA URNS AUTO: >10 /HPF
RSV RNA SPEC NAA+PROBE: NEGATIVE
SARS-COV-2 RNA RESP QL NAA+PROBE: NOT DETECTED
SODIUM SERPL-SCNC: 141 MMOL/L (ref 136–145)
SP GR UR STRIP.AUTO: 1.02 (ref 1–1.03)
UROBILINOGEN UR STRIP.AUTO-MCNC: 0.2 MG/DL
WBC # BLD AUTO: 10.4 X10(3) UL (ref 4–11)

## 2022-11-29 PROCEDURE — 71045 X-RAY EXAM CHEST 1 VIEW: CPT | Performed by: EMERGENCY MEDICINE

## 2022-11-29 PROCEDURE — 71275 CT ANGIOGRAPHY CHEST: CPT | Performed by: EMERGENCY MEDICINE

## 2022-11-29 PROCEDURE — 74177 CT ABD & PELVIS W/CONTRAST: CPT | Performed by: EMERGENCY MEDICINE

## 2022-11-29 RX ORDER — IOHEXOL 350 MG/ML
100 INJECTION, SOLUTION INTRAVENOUS
Status: COMPLETED | OUTPATIENT
Start: 2022-11-29 | End: 2022-11-29

## 2022-11-29 RX ORDER — CLONIDINE HYDROCHLORIDE 0.1 MG/1
0.2 TABLET ORAL ONCE
Status: COMPLETED | OUTPATIENT
Start: 2022-11-29 | End: 2022-11-29

## 2022-11-29 RX ORDER — ONDANSETRON 2 MG/ML
4 INJECTION INTRAMUSCULAR; INTRAVENOUS ONCE
Status: COMPLETED | OUTPATIENT
Start: 2022-11-29 | End: 2022-11-29

## 2022-11-29 RX ORDER — LABETALOL HYDROCHLORIDE 5 MG/ML
10 INJECTION, SOLUTION INTRAVENOUS ONCE
Status: COMPLETED | OUTPATIENT
Start: 2022-11-29 | End: 2022-11-29

## 2022-11-30 ENCOUNTER — APPOINTMENT (OUTPATIENT)
Dept: CT IMAGING | Facility: HOSPITAL | Age: 52
End: 2022-11-30
Attending: HOSPITALIST
Payer: MEDICAID

## 2022-11-30 PROBLEM — I10 UNCONTROLLED HYPERTENSION: Status: ACTIVE | Noted: 2022-11-30

## 2022-11-30 PROBLEM — R50.9 FEVER, UNSPECIFIED FEVER CAUSE: Status: ACTIVE | Noted: 2022-11-30

## 2022-11-30 LAB
ADENOVIRUS PCR:: NOT DETECTED
ATRIAL RATE: 121 BPM
B PARAPERT DNA SPEC QL NAA+PROBE: NOT DETECTED
B PERT DNA SPEC QL NAA+PROBE: NOT DETECTED
C PNEUM DNA SPEC QL NAA+PROBE: NOT DETECTED
CORONAVIRUS 229E PCR:: NOT DETECTED
CORONAVIRUS HKU1 PCR:: NOT DETECTED
CORONAVIRUS NL63 PCR:: NOT DETECTED
CORONAVIRUS OC43 PCR:: NOT DETECTED
FLUAV RNA SPEC QL NAA+PROBE: NOT DETECTED
FLUBV RNA SPEC QL NAA+PROBE: NOT DETECTED
GLUCOSE BLD-MCNC: 108 MG/DL (ref 70–99)
METAPNEUMOVIRUS PCR:: NOT DETECTED
MYCOPLASMA PNEUMONIA PCR:: NOT DETECTED
P AXIS: 45 DEGREES
P-R INTERVAL: 148 MS
PARAINFLUENZA 1 PCR:: NOT DETECTED
PARAINFLUENZA 2 PCR:: NOT DETECTED
PARAINFLUENZA 3 PCR:: NOT DETECTED
PARAINFLUENZA 4 PCR:: NOT DETECTED
Q-T INTERVAL: 316 MS
QRS DURATION: 82 MS
QTC CALCULATION (BEZET): 448 MS
R AXIS: 0 DEGREES
RHINOVIRUS/ENTERO PCR:: NOT DETECTED
RSV RNA SPEC QL NAA+PROBE: NOT DETECTED
SARS-COV-2 RNA NPH QL NAA+NON-PROBE: NOT DETECTED
T AXIS: 65 DEGREES
VENTRICULAR RATE: 121 BPM

## 2022-11-30 PROCEDURE — 70450 CT HEAD/BRAIN W/O DYE: CPT | Performed by: HOSPITALIST

## 2022-11-30 RX ORDER — BISACODYL 10 MG
10 SUPPOSITORY, RECTAL RECTAL
Status: DISCONTINUED | OUTPATIENT
Start: 2022-11-30 | End: 2022-12-03

## 2022-11-30 RX ORDER — SODIUM CHLORIDE 9 MG/ML
INJECTION, SOLUTION INTRAVENOUS CONTINUOUS
Status: DISCONTINUED | OUTPATIENT
Start: 2022-11-30 | End: 2022-12-03

## 2022-11-30 RX ORDER — AMLODIPINE BESYLATE 10 MG/1
10 TABLET ORAL DAILY
Status: DISCONTINUED | OUTPATIENT
Start: 2022-11-30 | End: 2022-11-30

## 2022-11-30 RX ORDER — ENOXAPARIN SODIUM 100 MG/ML
40 INJECTION SUBCUTANEOUS DAILY
Status: DISCONTINUED | OUTPATIENT
Start: 2022-11-30 | End: 2022-12-02

## 2022-11-30 RX ORDER — LISINOPRIL 40 MG/1
40 TABLET ORAL DAILY
Status: DISCONTINUED | OUTPATIENT
Start: 2022-11-30 | End: 2022-11-30

## 2022-11-30 RX ORDER — TERAZOSIN 10 MG/1
10 CAPSULE ORAL NIGHTLY
Status: DISCONTINUED | OUTPATIENT
Start: 2022-11-30 | End: 2022-12-03

## 2022-11-30 RX ORDER — ACETAMINOPHEN 500 MG
500 TABLET ORAL EVERY 4 HOURS PRN
Status: DISCONTINUED | OUTPATIENT
Start: 2022-11-30 | End: 2022-12-03

## 2022-11-30 RX ORDER — CLONIDINE HYDROCHLORIDE 0.1 MG/1
0.3 TABLET ORAL 2 TIMES DAILY
Status: DISCONTINUED | OUTPATIENT
Start: 2022-11-30 | End: 2022-11-30

## 2022-11-30 RX ORDER — HYDRALAZINE HYDROCHLORIDE 50 MG/1
100 TABLET, FILM COATED ORAL 3 TIMES DAILY
Status: DISCONTINUED | OUTPATIENT
Start: 2022-11-30 | End: 2022-11-30

## 2022-11-30 RX ORDER — POLYETHYLENE GLYCOL 3350 17 G/17G
17 POWDER, FOR SOLUTION ORAL DAILY PRN
Status: DISCONTINUED | OUTPATIENT
Start: 2022-11-30 | End: 2022-12-03

## 2022-11-30 RX ORDER — METOPROLOL TARTRATE 100 MG/1
100 TABLET ORAL
Status: DISCONTINUED | OUTPATIENT
Start: 2022-11-30 | End: 2022-11-30

## 2022-11-30 RX ORDER — POTASSIUM CHLORIDE 1.5 G/1.77G
40 POWDER, FOR SOLUTION ORAL ONCE
Status: COMPLETED | OUTPATIENT
Start: 2022-11-30 | End: 2022-11-30

## 2022-11-30 RX ORDER — ONDANSETRON 2 MG/ML
4 INJECTION INTRAMUSCULAR; INTRAVENOUS EVERY 6 HOURS PRN
Status: DISCONTINUED | OUTPATIENT
Start: 2022-11-30 | End: 2022-12-03

## 2022-11-30 RX ORDER — SENNOSIDES 8.6 MG
17.2 TABLET ORAL NIGHTLY PRN
Status: DISCONTINUED | OUTPATIENT
Start: 2022-11-30 | End: 2022-12-03

## 2022-11-30 RX ORDER — SODIUM BICARBONATE 325 MG/1
325 TABLET ORAL AS NEEDED
Status: DISCONTINUED | OUTPATIENT
Start: 2022-11-30 | End: 2022-12-03

## 2022-11-30 RX ORDER — LABETALOL HYDROCHLORIDE 5 MG/ML
10 INJECTION, SOLUTION INTRAVENOUS ONCE
Status: DISCONTINUED | OUTPATIENT
Start: 2022-11-30 | End: 2022-11-30

## 2022-11-30 RX ORDER — LABETALOL HYDROCHLORIDE 5 MG/ML
10 INJECTION, SOLUTION INTRAVENOUS EVERY 6 HOURS PRN
Status: DISCONTINUED | OUTPATIENT
Start: 2022-11-30 | End: 2022-12-03

## 2022-11-30 NOTE — ED QUICK NOTES
Patient and family member updated on POC, awaiting for IP room to be cleaned. Denies any complaints at this time, RR even/NL, on continuous cardiac monitoring, wctm closely, call light within reach.  Bed in low/locked position, family at bedside

## 2022-11-30 NOTE — ED INITIAL ASSESSMENT (HPI)
Patient bib ems from SNF for fever    Patient is nonverbal and has residual R sided weakness s/p CVA    Per EMS patient has been having fevers and n/v intermittently for the \"past 8 weeks since he got a castillo placed\".

## 2022-11-30 NOTE — ED QUICK NOTES
Report endorsed to Pa De La Rosa RN assuming patient care at this time    Patient and brother at bedside updated on POC, awaiting IP room to be cleaned and will then set up transport to IP room

## 2022-11-30 NOTE — PLAN OF CARE
Assumed care at 0200  Nonverbal; can say yes or no and shakes head; baseline  AOx1; said name. Lethargic  Denied pain  Bedrest  R side flaccid; baseline from previous CVA  L facial droop  NPO;  All meds crushed through G tube  Low BP's 80's/50's; 1,000cc bolus given; /60 30 mins after bolus complete  Admitted with castillo; yellow, clear urine

## 2022-12-01 ENCOUNTER — APPOINTMENT (OUTPATIENT)
Dept: GENERAL RADIOLOGY | Facility: HOSPITAL | Age: 52
End: 2022-12-01
Attending: UROLOGY
Payer: MEDICAID

## 2022-12-01 ENCOUNTER — APPOINTMENT (OUTPATIENT)
Dept: ULTRASOUND IMAGING | Facility: HOSPITAL | Age: 52
End: 2022-12-01
Attending: HOSPITALIST
Payer: MEDICAID

## 2022-12-01 ENCOUNTER — ANESTHESIA (OUTPATIENT)
Dept: SURGERY | Facility: HOSPITAL | Age: 52
End: 2022-12-01
Payer: MEDICAID

## 2022-12-01 ENCOUNTER — ANESTHESIA EVENT (OUTPATIENT)
Dept: SURGERY | Facility: HOSPITAL | Age: 52
End: 2022-12-01
Payer: MEDICAID

## 2022-12-01 LAB
ALBUMIN SERPL-MCNC: 2.5 G/DL (ref 3.4–5)
ALBUMIN/GLOB SERPL: 0.7 {RATIO} (ref 1–2)
ALP LIVER SERPL-CCNC: 92 U/L
ALT SERPL-CCNC: 25 U/L
ANION GAP SERPL CALC-SCNC: 2 MMOL/L (ref 0–18)
AST SERPL-CCNC: 14 U/L (ref 15–37)
BASOPHILS # BLD AUTO: 0.04 X10(3) UL (ref 0–0.2)
BASOPHILS NFR BLD AUTO: 0.5 %
BILIRUB SERPL-MCNC: 0.2 MG/DL (ref 0.1–2)
BUN BLD-MCNC: 20 MG/DL (ref 7–18)
CALCIUM BLD-MCNC: 9.2 MG/DL (ref 8.5–10.1)
CHLORIDE SERPL-SCNC: 115 MMOL/L (ref 98–112)
CO2 SERPL-SCNC: 26 MMOL/L (ref 21–32)
CREAT BLD-MCNC: 1.12 MG/DL
EOSINOPHIL # BLD AUTO: 0.24 X10(3) UL (ref 0–0.7)
EOSINOPHIL NFR BLD AUTO: 3.3 %
ERYTHROCYTE [DISTWIDTH] IN BLOOD BY AUTOMATED COUNT: 14.6 %
GFR SERPLBLD BASED ON 1.73 SQ M-ARVRAT: 78 ML/MIN/1.73M2 (ref 60–?)
GLOBULIN PLAS-MCNC: 3.8 G/DL (ref 2.8–4.4)
GLUCOSE BLD-MCNC: 100 MG/DL (ref 70–99)
GLUCOSE BLD-MCNC: 105 MG/DL (ref 70–99)
GLUCOSE BLD-MCNC: 111 MG/DL (ref 70–99)
GLUCOSE BLD-MCNC: 121 MG/DL (ref 70–99)
GLUCOSE BLD-MCNC: 122 MG/DL (ref 70–99)
GLUCOSE BLD-MCNC: 99 MG/DL (ref 70–99)
HCT VFR BLD AUTO: 28.7 %
HGB BLD-MCNC: 9.1 G/DL
IMM GRANULOCYTES # BLD AUTO: 0.02 X10(3) UL (ref 0–1)
IMM GRANULOCYTES NFR BLD: 0.3 %
LYMPHOCYTES # BLD AUTO: 1.09 X10(3) UL (ref 1–4)
LYMPHOCYTES NFR BLD AUTO: 15 %
MAGNESIUM SERPL-MCNC: 2 MG/DL (ref 1.6–2.6)
MCH RBC QN AUTO: 29 PG (ref 26–34)
MCHC RBC AUTO-ENTMCNC: 31.7 G/DL (ref 31–37)
MCV RBC AUTO: 91.4 FL
MONOCYTES # BLD AUTO: 0.69 X10(3) UL (ref 0.1–1)
MONOCYTES NFR BLD AUTO: 9.5 %
NEUTROPHILS # BLD AUTO: 5.21 X10 (3) UL (ref 1.5–7.7)
NEUTROPHILS # BLD AUTO: 5.21 X10(3) UL (ref 1.5–7.7)
NEUTROPHILS NFR BLD AUTO: 71.4 %
OSMOLALITY SERPL CALC.SUM OF ELEC: 300 MOSM/KG (ref 275–295)
PHOSPHATE SERPL-MCNC: 2.8 MG/DL (ref 2.5–4.9)
PLATELET # BLD AUTO: 312 10(3)UL (ref 150–450)
POTASSIUM SERPL-SCNC: 3.5 MMOL/L (ref 3.5–5.1)
POTASSIUM SERPL-SCNC: 3.5 MMOL/L (ref 3.5–5.1)
PROT SERPL-MCNC: 6.3 G/DL (ref 6.4–8.2)
RBC # BLD AUTO: 3.14 X10(6)UL
SODIUM SERPL-SCNC: 143 MMOL/L (ref 136–145)
WBC # BLD AUTO: 7.3 X10(3) UL (ref 4–11)

## 2022-12-01 PROCEDURE — BT141ZZ FLUOROSCOPY OF KIDNEYS, URETERS AND BLADDER USING LOW OSMOLAR CONTRAST: ICD-10-PCS | Performed by: UROLOGY

## 2022-12-01 PROCEDURE — 93970 EXTREMITY STUDY: CPT | Performed by: HOSPITALIST

## 2022-12-01 RX ORDER — CLONIDINE HYDROCHLORIDE 0.1 MG/1
0.3 TABLET ORAL 2 TIMES DAILY
Status: DISCONTINUED | OUTPATIENT
Start: 2022-12-01 | End: 2022-12-03

## 2022-12-01 RX ORDER — HYDROMORPHONE HYDROCHLORIDE 1 MG/ML
0.6 INJECTION, SOLUTION INTRAMUSCULAR; INTRAVENOUS; SUBCUTANEOUS EVERY 5 MIN PRN
Status: DISCONTINUED | OUTPATIENT
Start: 2022-12-01 | End: 2022-12-01 | Stop reason: HOSPADM

## 2022-12-01 RX ORDER — DEXAMETHASONE SODIUM PHOSPHATE 4 MG/ML
VIAL (ML) INJECTION AS NEEDED
Status: DISCONTINUED | OUTPATIENT
Start: 2022-12-01 | End: 2022-12-01 | Stop reason: SURG

## 2022-12-01 RX ORDER — NALOXONE HYDROCHLORIDE 0.4 MG/ML
80 INJECTION, SOLUTION INTRAMUSCULAR; INTRAVENOUS; SUBCUTANEOUS AS NEEDED
Status: DISCONTINUED | OUTPATIENT
Start: 2022-12-01 | End: 2022-12-01 | Stop reason: HOSPADM

## 2022-12-01 RX ORDER — HYDROCODONE BITARTRATE AND ACETAMINOPHEN 5; 325 MG/1; MG/1
1 TABLET ORAL ONCE AS NEEDED
Status: DISCONTINUED | OUTPATIENT
Start: 2022-12-01 | End: 2022-12-01 | Stop reason: HOSPADM

## 2022-12-01 RX ORDER — HYDROMORPHONE HYDROCHLORIDE 1 MG/ML
0.2 INJECTION, SOLUTION INTRAMUSCULAR; INTRAVENOUS; SUBCUTANEOUS EVERY 5 MIN PRN
Status: DISCONTINUED | OUTPATIENT
Start: 2022-12-01 | End: 2022-12-01 | Stop reason: HOSPADM

## 2022-12-01 RX ORDER — SODIUM CHLORIDE, SODIUM LACTATE, POTASSIUM CHLORIDE, CALCIUM CHLORIDE 600; 310; 30; 20 MG/100ML; MG/100ML; MG/100ML; MG/100ML
INJECTION, SOLUTION INTRAVENOUS CONTINUOUS PRN
Status: DISCONTINUED | OUTPATIENT
Start: 2022-12-01 | End: 2022-12-01 | Stop reason: SURG

## 2022-12-01 RX ORDER — AMLODIPINE BESYLATE 10 MG/1
10 TABLET ORAL DAILY
Status: DISCONTINUED | OUTPATIENT
Start: 2022-12-01 | End: 2022-12-01

## 2022-12-01 RX ORDER — SODIUM CHLORIDE, SODIUM LACTATE, POTASSIUM CHLORIDE, CALCIUM CHLORIDE 600; 310; 30; 20 MG/100ML; MG/100ML; MG/100ML; MG/100ML
INJECTION, SOLUTION INTRAVENOUS CONTINUOUS
Status: DISCONTINUED | OUTPATIENT
Start: 2022-12-01 | End: 2022-12-01 | Stop reason: HOSPADM

## 2022-12-01 RX ORDER — SODIUM CHLORIDE 9 MG/ML
INJECTION, SOLUTION INTRAVENOUS CONTINUOUS
Status: DISCONTINUED | OUTPATIENT
Start: 2022-12-01 | End: 2022-12-01 | Stop reason: HOSPADM

## 2022-12-01 RX ORDER — AMLODIPINE BESYLATE 10 MG/1
10 TABLET ORAL
Status: DISCONTINUED | OUTPATIENT
Start: 2022-12-01 | End: 2022-12-03

## 2022-12-01 RX ORDER — HYDROMORPHONE HYDROCHLORIDE 1 MG/ML
0.4 INJECTION, SOLUTION INTRAMUSCULAR; INTRAVENOUS; SUBCUTANEOUS EVERY 5 MIN PRN
Status: DISCONTINUED | OUTPATIENT
Start: 2022-12-01 | End: 2022-12-01 | Stop reason: HOSPADM

## 2022-12-01 RX ORDER — METOPROLOL TARTRATE 100 MG/1
100 TABLET ORAL
Status: DISCONTINUED | OUTPATIENT
Start: 2022-12-01 | End: 2022-12-03

## 2022-12-01 RX ORDER — METOPROLOL TARTRATE 5 MG/5ML
2.5 INJECTION INTRAVENOUS ONCE
Status: DISCONTINUED | OUTPATIENT
Start: 2022-12-01 | End: 2022-12-01 | Stop reason: HOSPADM

## 2022-12-01 RX ORDER — ONDANSETRON 2 MG/ML
INJECTION INTRAMUSCULAR; INTRAVENOUS AS NEEDED
Status: DISCONTINUED | OUTPATIENT
Start: 2022-12-01 | End: 2022-12-01 | Stop reason: SURG

## 2022-12-01 RX ORDER — ONDANSETRON 2 MG/ML
4 INJECTION INTRAMUSCULAR; INTRAVENOUS EVERY 6 HOURS PRN
Status: DISCONTINUED | OUTPATIENT
Start: 2022-12-01 | End: 2022-12-01 | Stop reason: HOSPADM

## 2022-12-01 RX ORDER — MIDAZOLAM HYDROCHLORIDE 1 MG/ML
1 INJECTION INTRAMUSCULAR; INTRAVENOUS EVERY 5 MIN PRN
Status: DISCONTINUED | OUTPATIENT
Start: 2022-12-01 | End: 2022-12-01 | Stop reason: HOSPADM

## 2022-12-01 RX ORDER — HYDRALAZINE HYDROCHLORIDE 50 MG/1
100 TABLET, FILM COATED ORAL 3 TIMES DAILY
Status: DISCONTINUED | OUTPATIENT
Start: 2022-12-01 | End: 2022-12-03

## 2022-12-01 RX ORDER — ACETAMINOPHEN 500 MG
1000 TABLET ORAL ONCE AS NEEDED
Status: DISCONTINUED | OUTPATIENT
Start: 2022-12-01 | End: 2022-12-01 | Stop reason: HOSPADM

## 2022-12-01 RX ORDER — HYDROCODONE BITARTRATE AND ACETAMINOPHEN 5; 325 MG/1; MG/1
2 TABLET ORAL ONCE AS NEEDED
Status: DISCONTINUED | OUTPATIENT
Start: 2022-12-01 | End: 2022-12-01 | Stop reason: HOSPADM

## 2022-12-01 RX ADMIN — SODIUM CHLORIDE, SODIUM LACTATE, POTASSIUM CHLORIDE, CALCIUM CHLORIDE: 600; 310; 30; 20 INJECTION, SOLUTION INTRAVENOUS at 18:59:00

## 2022-12-01 RX ADMIN — ONDANSETRON 4 MG: 2 INJECTION INTRAMUSCULAR; INTRAVENOUS at 19:19:00

## 2022-12-01 RX ADMIN — DEXAMETHASONE SODIUM PHOSPHATE 4 MG: 4 MG/ML VIAL (ML) INJECTION at 19:19:00

## 2022-12-01 NOTE — PLAN OF CARE
Assumed care at 0730. Alert to self, non verbal, baseline. CT of head (-). IVF infusing per order. TF infusing per order. Discharge from urethra and low UO. MD notified, urology consulted. Urinary catheter repositioned and irrigated. Afebrile. US of L leg ordered, to be done tonight per US. Family at bedside updated on POC.

## 2022-12-01 NOTE — PROGRESS NOTES
Assumed care at 0730  AOX2, Nonverbal. On Room air  NSR on tele. IVF ongoing. IV Zosyn for UTI. US venous doppler positive for DVT of left femoral vein. Neuro consulted. Called placed. Seen by urologist. TF on hold since 0730. Pt is to have a cytoscopy, bilateral retrograde. Pyelogram with possible left urethral stent placement. Verbal consent received from brother . Placed in chart. BM X1. Kirkland care performed. All needs met at this time.

## 2022-12-01 NOTE — PLAN OF CARE
Received pt at 1930  Pt AOx2, NSR, RA, VSS  TF infusing  IVF & IV abx  Minimally verbal b/l  Mepilex to sacrum  D/c Planning: US BLE  Call light within reach.   Needs currently met

## 2022-12-02 ENCOUNTER — APPOINTMENT (OUTPATIENT)
Dept: MRI IMAGING | Facility: HOSPITAL | Age: 52
End: 2022-12-02
Attending: Other
Payer: MEDICAID

## 2022-12-02 LAB
ANION GAP SERPL CALC-SCNC: 3 MMOL/L (ref 0–18)
APTT PPP: 33.4 SECONDS (ref 23.3–35.6)
APTT PPP: 99.1 SECONDS (ref 23.3–35.6)
BUN BLD-MCNC: 17 MG/DL (ref 7–18)
CALCIUM BLD-MCNC: 9.1 MG/DL (ref 8.5–10.1)
CHLORIDE SERPL-SCNC: 115 MMOL/L (ref 98–112)
CO2 SERPL-SCNC: 25 MMOL/L (ref 21–32)
CREAT BLD-MCNC: 0.96 MG/DL
ERYTHROCYTE [DISTWIDTH] IN BLOOD BY AUTOMATED COUNT: 14.4 %
GFR SERPLBLD BASED ON 1.73 SQ M-ARVRAT: 94 ML/MIN/1.73M2 (ref 60–?)
GLUCOSE BLD-MCNC: 112 MG/DL (ref 70–99)
GLUCOSE BLD-MCNC: 112 MG/DL (ref 70–99)
GLUCOSE BLD-MCNC: 125 MG/DL (ref 70–99)
GLUCOSE BLD-MCNC: 131 MG/DL (ref 70–99)
GLUCOSE BLD-MCNC: 131 MG/DL (ref 70–99)
GLUCOSE BLD-MCNC: 96 MG/DL (ref 70–99)
HCT VFR BLD AUTO: 27.3 %
HGB BLD-MCNC: 8.6 G/DL
MCH RBC QN AUTO: 28.9 PG (ref 26–34)
MCHC RBC AUTO-ENTMCNC: 31.5 G/DL (ref 31–37)
MCV RBC AUTO: 91.6 FL
OSMOLALITY SERPL CALC.SUM OF ELEC: 298 MOSM/KG (ref 275–295)
PLATELET # BLD AUTO: 323 10(3)UL (ref 150–450)
POTASSIUM SERPL-SCNC: 3.8 MMOL/L (ref 3.5–5.1)
RBC # BLD AUTO: 2.98 X10(6)UL
SODIUM SERPL-SCNC: 143 MMOL/L (ref 136–145)
WBC # BLD AUTO: 7.4 X10(3) UL (ref 4–11)

## 2022-12-02 PROCEDURE — 70551 MRI BRAIN STEM W/O DYE: CPT | Performed by: OTHER

## 2022-12-02 PROCEDURE — 99223 1ST HOSP IP/OBS HIGH 75: CPT | Performed by: OTHER

## 2022-12-02 RX ORDER — HEPARIN SODIUM AND DEXTROSE 10000; 5 [USP'U]/100ML; G/100ML
INJECTION INTRAVENOUS CONTINUOUS
Status: DISCONTINUED | OUTPATIENT
Start: 2022-12-02 | End: 2022-12-03

## 2022-12-02 RX ORDER — HEPARIN SODIUM AND DEXTROSE 10000; 5 [USP'U]/100ML; G/100ML
18 INJECTION INTRAVENOUS ONCE
Status: COMPLETED | OUTPATIENT
Start: 2022-12-02 | End: 2022-12-02

## 2022-12-02 RX ORDER — LORAZEPAM 2 MG/ML
1 INJECTION INTRAMUSCULAR ONCE
Status: COMPLETED | OUTPATIENT
Start: 2022-12-02 | End: 2022-12-02

## 2022-12-02 NOTE — PLAN OF CARE
Received pt at 2100 from PACU  Pt AOx3, NSR, weaned to RA, VSS  TF resumed  IVF & IV abx  R side flaccid, minimally verbal b/l  Mepilex to sacrum. WC to see  Kirkland draining clear peach/ light pink urine. White discharge  D/c Planning: Neuro to see for anti coag rec. WC to see  Call light within reach.   Needs currently met

## 2022-12-02 NOTE — ANESTHESIA POSTPROCEDURE EVALUATION
[address] [place] [address] Patient Status:  Inpatient   Age/Gender 45year old male MRN [id number]   [facility] SURGERY Attending Danielle Dove, [address] [place] [address] 1 PCP Pineda Manzo MD       Anesthesia Post-op Note    CYSTOSCOPY, BILATERAL RETROGRADE, PYELOGRAM    Procedure Summary     Date: 12/01/22 Room / Location: [address] [place] [address] MAIN OR    Anesthesia Start: 1901 Anesthesia Stop: 1950    Procedure: CYSTOSCOPY, BILATERAL RETROGRADE, PYELOGRAM (Left: Ureter) Diagnosis:       Hydronephrosis      (same as pre-op)    Surgeons: Heather Brower MD Anesthesiologist: Bryon Benitez MD    Anesthesia Type: general ASA Status: 3          Anesthesia Type: No value filed. Vitals Value Taken Time   /81 12/01/22 1952   Temp 98.1 12/01/22 1952   Pulse 101 12/01/22 1952   Resp 20 12/01/22 1952   SpO2 99 12/01/22 1952       Patient Location: PACU    Anesthesia Type: general    Airway Patency: patent and extubated    Postop Pain Control: adequate    Mental Status: mildly sedated but able to meaningfully participate in the post-anesthesia evaluation    Nausea/Vomiting: none    Cardiopulmonary/Hydration status: stable euvolemic    Complications: no apparent anesthesia related complications    Postop vital signs: stable    Dental Exam: Unchanged from Preop    Patient to be discharged from PACU when criteria met.

## 2022-12-02 NOTE — OPERATIVE REPORT
Operative Note    Patient Name: Blanka Ruvalcaba    Preoperative Diagnosis: Hydronephrosis [N13.30], UTI    Postoperative Diagnosis: resolved left hydronephrosis    Surgeon(s) and Role:     * Jamila Moreno MD - Primary     Assistant:      Procedures: cystoscopy, bilateral retrograde pyelogram    Surgical Findings: small prostate, generalized cystitis, normal UOs and clear efflux bilaterally, resolved left hydronephrosis    Anesthesia: General    Complications: no    Specimen: no    Drains: 16Fr castillo    Condition: stable    Estimated Blood Loss: 0cc      Procedure Summary: After informed consent was obtained and in the chart, preoperative antibiotic timing was verified, and the patient was taken to the operating room suite and placed on the operating room table in supine position. Indwelling castillo catheter was removed. After right sided sequential devices had been applied and satisfactory general anesthesia had been achieved, the patients' legs were raised to a dorsal lithotomy position. The patients' groin was prepped and draped in the usual sterile fashion. A well lubricated 21Fr rigid cystoscope was introduced through through the urethra, and into the bladder. There is a meatal erosion to the ventral corona from the chronic castillo catheter routine. The urethra is normal.  The prostate is small and not visually obstructing. Upon entering the bladder, there was generalized cystitis and 1+ trabeculation, no stone, no tumor, no diverticuli. The ureteral orifices were identified. A 5Fr open ended catheter was passed up the right ureteral orifice. A retrograde pyelogram showed no hydronephrosis and prompt flow down the ureter and out the orifice without filling defect or obstruction. A 5Fr open ended catheter was passed up the left ureteral orifice. A retrograde pyelogram showed again no hydronephrosis, so resolution compared to the CT.   The ureter is delicate and becomes a little more distended in the distal third but still drains out the ureteral orifice with a normal jet without obstruction and without filling defect. No ureter stent placement was needed. The cystoscope was removed. A 16 German castillo catheter was placed and the procedure was terminated. Disposition: The patient tolerated the procedure well, was extubated in the OR and transferred to the PACU in stable condition with no immediately apparent complications. The patient is to follow up for routine castillo catheter exchanges (monthly) at his facility. He will complete his antibiotics.        Milagro Smith MD  [address] [place] [address]          Milagro Smith MD

## 2022-12-02 NOTE — PLAN OF CARE
Assumed care around 0730. AxO x2-3. R side flaccid, minimally verbal.   NSR on tele, RA, VSS. Hep gtt started. IVF/IV abx. TF infusing. WC saw at bedside this afternoon, see notes for dressing orders. Wife at bedside this am.   Pt and wife updated on poc. Pt needs met, call light within reach.

## 2022-12-02 NOTE — ANESTHESIA PROCEDURE NOTES
Airway  Date/Time: 12/1/2022 7:11 PM  Urgency: elective      General Information and Staff    Patient location during procedure: OR  Anesthesiologist: Tere Shore MD  Performed: anesthesiologist     Indications and Patient Condition  Indications for airway management: anesthesia  Sedation level: deep  Preoxygenated: yes  Patient position: sniffing  Mask difficulty assessment: 0 - not attempted    Final Airway Details  Final airway type: supraglottic airway      Successful airway: classic  Size 4       Number of attempts at approach: 1

## 2022-12-03 VITALS
SYSTOLIC BLOOD PRESSURE: 144 MMHG | BODY MASS INDEX: 30 KG/M2 | WEIGHT: 213 LBS | RESPIRATION RATE: 18 BRPM | OXYGEN SATURATION: 97 % | HEART RATE: 52 BPM | TEMPERATURE: 98 F | DIASTOLIC BLOOD PRESSURE: 81 MMHG

## 2022-12-03 LAB
APTT PPP: 87.8 SECONDS (ref 23.3–35.6)
APTT PPP: >240 SECONDS (ref 23.3–35.6)
ERYTHROCYTE [DISTWIDTH] IN BLOOD BY AUTOMATED COUNT: 14.3 %
GLUCOSE BLD-MCNC: 114 MG/DL (ref 70–99)
GLUCOSE BLD-MCNC: 143 MG/DL (ref 70–99)
HCT VFR BLD AUTO: 28.6 %
HGB BLD-MCNC: 9 G/DL
MCH RBC QN AUTO: 29.2 PG (ref 26–34)
MCHC RBC AUTO-ENTMCNC: 31.5 G/DL (ref 31–37)
MCV RBC AUTO: 92.9 FL
PLATELET # BLD AUTO: 334 10(3)UL (ref 150–450)
RBC # BLD AUTO: 3.08 X10(6)UL
SARS-COV-2 RNA RESP QL NAA+PROBE: NOT DETECTED
WBC # BLD AUTO: 7 X10(3) UL (ref 4–11)

## 2022-12-03 PROCEDURE — 99232 SBSQ HOSP IP/OBS MODERATE 35: CPT | Performed by: OTHER

## 2022-12-03 RX ORDER — CEFDINIR 300 MG/1
300 CAPSULE ORAL 2 TIMES DAILY
Qty: 20 CAPSULE | Refills: 0 | Status: SHIPPED | OUTPATIENT
Start: 2022-12-03 | End: 2022-12-13

## 2022-12-03 NOTE — PROGRESS NOTES
NURSING DISCHARGE NOTE    Discharged Nursing home via Ambulance. Accompanied by Support staff  Belongings Taken by patient/family. PIV x2 removed. Tele removed. Disconnected from PEG tube feedings and flushed tube/ clamped. Patient to return to [facility]. Updated patient and all needs met prior to discharge.

## 2022-12-03 NOTE — PLAN OF CARE
0900  Tolerating tube feedings  Heparin gtt infusing.  Per neurology's note ok for [facility]  Urology awaiting final cultures  Kirkland catheter in place-chronic  [email or web address]  Plan for dc to [facility] when ready  Continues to get IVF, some swelling to BLE  IV zosyn  Sacral wound due to be changed 12/4 or PRN  Turn every two hours  Xarelto started and heparin gtt stopped

## 2022-12-03 NOTE — PROGRESS NOTES
Assumed care @ 1930. Pt a/o x2-3, slurred speech, expressive aphasia. Rt side flaccid. VSS. Tele SR. No acute respiratory distress noted. O2 sat 92% on room air  Denies any pain. Pt resting in bed. (-) BM. Follow up MRI from previous ICH, neg for acute changes. Kirkland in place. No complications noted. Plan to determine IVC filter vs AC per neuro. No other complaints made. Will continue to monitor.

## 2022-12-03 NOTE — PLAN OF CARE
Plan for discharge at [address] notified  Report called to [facility]. Declined flu vaccine family and patient. Want to wait until abx completed  Plan to give BP medications at 1530 prior to discharge.

## 2022-12-03 NOTE — DISCHARGE INSTRUCTIONS
YOU WILL NEED REFILLS ON YOUR Lani Docker.  Apurva Desai MD in 1 week    Monthly catheter exchanges  Follow up with your neurologist

## 2022-12-03 NOTE — CM/SW NOTE
12/03/22 1319   Discharge disposition   Expected discharge disposition Skilled Nurs   [address] [facility] Provider [facility]   Discharge transportation [facility]     Pt cleared for dc to [facility]- [facility] arranged for 4pm. RN to call report to [phone].     Miki Landau, LCSW  /Discharge Planner

## 2023-02-09 ENCOUNTER — APPOINTMENT (OUTPATIENT)
Dept: GENERAL RADIOLOGY | Facility: HOSPITAL | Age: 53
End: 2023-02-09
Attending: EMERGENCY MEDICINE
Payer: MEDICAID

## 2023-02-09 ENCOUNTER — HOSPITAL ENCOUNTER (INPATIENT)
Facility: HOSPITAL | Age: 53
LOS: 7 days | Discharge: NURSING, REHAB OR LONG-TERM CARE | End: 2023-02-16
Attending: EMERGENCY MEDICINE | Admitting: INTERNAL MEDICINE
Payer: MEDICAID

## 2023-02-09 ENCOUNTER — APPOINTMENT (OUTPATIENT)
Dept: CT IMAGING | Facility: HOSPITAL | Age: 53
End: 2023-02-09
Attending: EMERGENCY MEDICINE
Payer: MEDICAID

## 2023-02-09 DIAGNOSIS — E87.20 LACTIC ACIDOSIS: ICD-10-CM

## 2023-02-09 DIAGNOSIS — R77.8 ELEVATED TROPONIN: ICD-10-CM

## 2023-02-09 DIAGNOSIS — N12 PYELONEPHRITIS: Primary | ICD-10-CM

## 2023-02-09 PROBLEM — R79.89 ELEVATED TROPONIN: Status: ACTIVE | Noted: 2023-02-09

## 2023-02-09 LAB
ALBUMIN SERPL-MCNC: 3.2 G/DL (ref 3.4–5)
ALBUMIN/GLOB SERPL: 0.7 {RATIO} (ref 1–2)
ALP LIVER SERPL-CCNC: 102 U/L
ALT SERPL-CCNC: 16 U/L
ANION GAP SERPL CALC-SCNC: 6 MMOL/L (ref 0–18)
AST SERPL-CCNC: 13 U/L (ref 15–37)
ATRIAL RATE: 137 BPM
BASE EXCESS BLDV CALC-SCNC: 2.4 MMOL/L
BASOPHILS # BLD AUTO: 0.02 X10(3) UL (ref 0–0.2)
BASOPHILS NFR BLD AUTO: 0.2 %
BILIRUB SERPL-MCNC: 0.2 MG/DL (ref 0.1–2)
BILIRUB UR QL STRIP.AUTO: NEGATIVE
BUN BLD-MCNC: 29 MG/DL (ref 7–18)
CALCIUM BLD-MCNC: 10.3 MG/DL (ref 8.5–10.1)
CHLORIDE SERPL-SCNC: 109 MMOL/L (ref 98–112)
CHOLEST SERPL-MCNC: 156 MG/DL (ref ?–200)
CO2 SERPL-SCNC: 25 MMOL/L (ref 21–32)
COLOR UR AUTO: YELLOW
CREAT BLD-MCNC: 1.21 MG/DL
EOSINOPHIL # BLD AUTO: 0.01 X10(3) UL (ref 0–0.7)
EOSINOPHIL NFR BLD AUTO: 0.1 %
ERYTHROCYTE [DISTWIDTH] IN BLOOD BY AUTOMATED COUNT: 14.1 %
GFR SERPLBLD BASED ON 1.73 SQ M-ARVRAT: 71 ML/MIN/1.73M2 (ref 60–?)
GLOBULIN PLAS-MCNC: 4.6 G/DL (ref 2.8–4.4)
GLUCOSE BLD-MCNC: 119 MG/DL (ref 70–99)
GLUCOSE BLD-MCNC: 122 MG/DL (ref 70–99)
GLUCOSE UR STRIP.AUTO-MCNC: 50 MG/DL
HCO3 BLDV-SCNC: 26.1 MEQ/L (ref 22–26)
HCT VFR BLD AUTO: 35.6 %
HDLC SERPL-MCNC: 34 MG/DL (ref 40–59)
HGB BLD-MCNC: 11.5 G/DL
IMM GRANULOCYTES # BLD AUTO: 0.03 X10(3) UL (ref 0–1)
IMM GRANULOCYTES NFR BLD: 0.3 %
KETONES UR STRIP.AUTO-MCNC: NEGATIVE MG/DL
LACTATE SERPL-SCNC: 1.4 MMOL/L (ref 0.4–2)
LACTATE SERPL-SCNC: 3 MMOL/L (ref 0.4–2)
LDLC SERPL CALC-MCNC: 107 MG/DL (ref ?–100)
LYMPHOCYTES # BLD AUTO: 0.59 X10(3) UL (ref 1–4)
LYMPHOCYTES NFR BLD AUTO: 5.1 %
MCH RBC QN AUTO: 29.3 PG (ref 26–34)
MCHC RBC AUTO-ENTMCNC: 32.3 G/DL (ref 31–37)
MCV RBC AUTO: 90.6 FL
MONOCYTES # BLD AUTO: 0.67 X10(3) UL (ref 0.1–1)
MONOCYTES NFR BLD AUTO: 5.8 %
NEUTROPHILS # BLD AUTO: 10.32 X10 (3) UL (ref 1.5–7.7)
NEUTROPHILS # BLD AUTO: 10.32 X10(3) UL (ref 1.5–7.7)
NEUTROPHILS NFR BLD AUTO: 88.5 %
NITRITE UR QL STRIP.AUTO: NEGATIVE
NONHDLC SERPL-MCNC: 122 MG/DL (ref ?–130)
OSMOLALITY SERPL CALC.SUM OF ELEC: 297 MOSM/KG (ref 275–295)
OXYHGB MFR BLDV: 73.2 % (ref 72–78)
P AXIS: 51 DEGREES
P-R INTERVAL: 140 MS
PCO2 BLDV: 38 MM HG (ref 38–50)
PH BLDV: 7.45 [PH] (ref 7.33–7.43)
PH UR STRIP.AUTO: 8 [PH] (ref 5–8)
PLATELET # BLD AUTO: 472 10(3)UL (ref 150–450)
PO2 BLDV: 43 MM HG (ref 30–50)
POTASSIUM SERPL-SCNC: 3.6 MMOL/L (ref 3.5–5.1)
PROT SERPL-MCNC: 7.8 G/DL (ref 6.4–8.2)
PROT UR STRIP.AUTO-MCNC: >=500 MG/DL
Q-T INTERVAL: 286 MS
QRS DURATION: 82 MS
QTC CALCULATION (BEZET): 431 MS
R AXIS: 10 DEGREES
RBC # BLD AUTO: 3.93 X10(6)UL
RBC #/AREA URNS AUTO: >10 /HPF
SARS-COV-2 RNA RESP QL NAA+PROBE: NOT DETECTED
SODIUM SERPL-SCNC: 140 MMOL/L (ref 136–145)
SP GR UR STRIP.AUTO: 1.01 (ref 1–1.03)
T AXIS: 45 DEGREES
TRIGL SERPL-MCNC: 79 MG/DL (ref 30–149)
TROPONIN I HIGH SENSITIVITY: 500 NG/L
TROPONIN I HIGH SENSITIVITY: 948 NG/L
UROBILINOGEN UR STRIP.AUTO-MCNC: <2 MG/DL
VENTRICULAR RATE: 137 BPM
VLDLC SERPL CALC-MCNC: 13 MG/DL (ref 0–30)
WBC # BLD AUTO: 11.6 X10(3) UL (ref 4–11)
WBC #/AREA URNS AUTO: >50 /HPF
WBC CLUMPS UR QL AUTO: PRESENT /HPF

## 2023-02-09 PROCEDURE — 87077 CULTURE AEROBIC IDENTIFY: CPT | Performed by: EMERGENCY MEDICINE

## 2023-02-09 PROCEDURE — 80053 COMPREHEN METABOLIC PANEL: CPT | Performed by: EMERGENCY MEDICINE

## 2023-02-09 PROCEDURE — 36415 COLL VENOUS BLD VENIPUNCTURE: CPT

## 2023-02-09 PROCEDURE — 81001 URINALYSIS AUTO W/SCOPE: CPT | Performed by: EMERGENCY MEDICINE

## 2023-02-09 PROCEDURE — 71275 CT ANGIOGRAPHY CHEST: CPT | Performed by: EMERGENCY MEDICINE

## 2023-02-09 PROCEDURE — 93005 ELECTROCARDIOGRAM TRACING: CPT

## 2023-02-09 PROCEDURE — 87186 SC STD MICRODIL/AGAR DIL: CPT | Performed by: EMERGENCY MEDICINE

## 2023-02-09 PROCEDURE — 82962 GLUCOSE BLOOD TEST: CPT

## 2023-02-09 PROCEDURE — 87040 BLOOD CULTURE FOR BACTERIA: CPT | Performed by: EMERGENCY MEDICINE

## 2023-02-09 PROCEDURE — 93010 ELECTROCARDIOGRAM REPORT: CPT

## 2023-02-09 PROCEDURE — 87086 URINE CULTURE/COLONY COUNT: CPT | Performed by: EMERGENCY MEDICINE

## 2023-02-09 PROCEDURE — 99291 CRITICAL CARE FIRST HOUR: CPT

## 2023-02-09 PROCEDURE — 96365 THER/PROPH/DIAG IV INF INIT: CPT

## 2023-02-09 PROCEDURE — 80061 LIPID PANEL: CPT | Performed by: EMERGENCY MEDICINE

## 2023-02-09 PROCEDURE — 74174 CTA ABD&PLVS W/CONTRAST: CPT | Performed by: EMERGENCY MEDICINE

## 2023-02-09 PROCEDURE — 83605 ASSAY OF LACTIC ACID: CPT | Performed by: INTERNAL MEDICINE

## 2023-02-09 PROCEDURE — 85025 COMPLETE CBC W/AUTO DIFF WBC: CPT | Performed by: EMERGENCY MEDICINE

## 2023-02-09 PROCEDURE — 82803 BLOOD GASES ANY COMBINATION: CPT | Performed by: EMERGENCY MEDICINE

## 2023-02-09 PROCEDURE — 96375 TX/PRO/DX INJ NEW DRUG ADDON: CPT

## 2023-02-09 PROCEDURE — 71045 X-RAY EXAM CHEST 1 VIEW: CPT | Performed by: EMERGENCY MEDICINE

## 2023-02-09 PROCEDURE — 84484 ASSAY OF TROPONIN QUANT: CPT | Performed by: EMERGENCY MEDICINE

## 2023-02-09 PROCEDURE — 84484 ASSAY OF TROPONIN QUANT: CPT | Performed by: INTERNAL MEDICINE

## 2023-02-09 PROCEDURE — 83605 ASSAY OF LACTIC ACID: CPT | Performed by: EMERGENCY MEDICINE

## 2023-02-09 PROCEDURE — 3E0G76Z INTRODUCTION OF NUTRITIONAL SUBSTANCE INTO UPPER GI, VIA NATURAL OR ARTIFICIAL OPENING: ICD-10-PCS | Performed by: INTERNAL MEDICINE

## 2023-02-09 RX ORDER — ONDANSETRON 2 MG/ML
4 INJECTION INTRAMUSCULAR; INTRAVENOUS EVERY 6 HOURS PRN
Status: DISCONTINUED | OUTPATIENT
Start: 2023-02-09 | End: 2023-02-16

## 2023-02-09 RX ORDER — ASCORBIC ACID 500 MG
500 TABLET ORAL DAILY
COMMUNITY

## 2023-02-09 RX ORDER — MELATONIN
2000 DAILY
Status: DISCONTINUED | OUTPATIENT
Start: 2023-02-09 | End: 2023-02-16

## 2023-02-09 RX ORDER — HYDRALAZINE HYDROCHLORIDE 50 MG/1
50 TABLET, FILM COATED ORAL 4 TIMES DAILY PRN
COMMUNITY
End: 2023-02-16

## 2023-02-09 RX ORDER — HYDRALAZINE HYDROCHLORIDE 50 MG/1
50 TABLET, FILM COATED ORAL EVERY 8 HOURS SCHEDULED
Status: DISCONTINUED | OUTPATIENT
Start: 2023-02-09 | End: 2023-02-09

## 2023-02-09 RX ORDER — LABETALOL HYDROCHLORIDE 5 MG/ML
20 INJECTION, SOLUTION INTRAVENOUS ONCE
Status: COMPLETED | OUTPATIENT
Start: 2023-02-09 | End: 2023-02-09

## 2023-02-09 RX ORDER — AMLODIPINE BESYLATE 5 MG/1
10 TABLET ORAL DAILY
Status: DISCONTINUED | OUTPATIENT
Start: 2023-02-09 | End: 2023-02-10

## 2023-02-09 RX ORDER — HYDRALAZINE HYDROCHLORIDE 50 MG/1
100 TABLET, FILM COATED ORAL EVERY 8 HOURS SCHEDULED
Status: DISCONTINUED | OUTPATIENT
Start: 2023-02-10 | End: 2023-02-09

## 2023-02-09 RX ORDER — TRIMETHOPRIM 100 MG/1
100 TABLET ORAL DAILY
COMMUNITY
End: 2023-02-16

## 2023-02-09 RX ORDER — SODIUM BICARBONATE 325 MG/1
325 TABLET ORAL AS NEEDED
Status: DISCONTINUED | OUTPATIENT
Start: 2023-02-09 | End: 2023-02-16

## 2023-02-09 RX ORDER — HYDRALAZINE HYDROCHLORIDE 50 MG/1
100 TABLET, FILM COATED ORAL EVERY 8 HOURS SCHEDULED
Status: DISCONTINUED | OUTPATIENT
Start: 2023-02-09 | End: 2023-02-11

## 2023-02-09 RX ORDER — SODIUM CHLORIDE 9 MG/ML
INJECTION, SOLUTION INTRAVENOUS CONTINUOUS
Status: DISCONTINUED | OUTPATIENT
Start: 2023-02-09 | End: 2023-02-13

## 2023-02-09 RX ORDER — TERAZOSIN 1 MG/1
1 CAPSULE ORAL NIGHTLY
Status: DISCONTINUED | OUTPATIENT
Start: 2023-02-09 | End: 2023-02-16

## 2023-02-09 RX ORDER — LISINOPRIL 40 MG/1
40 TABLET ORAL DAILY
Status: DISCONTINUED | OUTPATIENT
Start: 2023-02-09 | End: 2023-02-11

## 2023-02-09 RX ORDER — ACETAMINOPHEN 325 MG/1
650 TABLET ORAL EVERY 6 HOURS PRN
Status: DISCONTINUED | OUTPATIENT
Start: 2023-02-09 | End: 2023-02-16

## 2023-02-10 ENCOUNTER — APPOINTMENT (OUTPATIENT)
Dept: CV DIAGNOSTICS | Facility: HOSPITAL | Age: 53
End: 2023-02-10
Attending: INTERNAL MEDICINE
Payer: MEDICAID

## 2023-02-10 LAB
ANION GAP SERPL CALC-SCNC: 8 MMOL/L (ref 0–18)
BASOPHILS # BLD AUTO: 0.02 X10(3) UL (ref 0–0.2)
BASOPHILS NFR BLD AUTO: 0.2 %
BUN BLD-MCNC: 25 MG/DL (ref 7–18)
CALCIUM BLD-MCNC: 9.4 MG/DL (ref 8.5–10.1)
CHLORIDE SERPL-SCNC: 111 MMOL/L (ref 98–112)
CO2 SERPL-SCNC: 23 MMOL/L (ref 21–32)
CREAT BLD-MCNC: 1.27 MG/DL
EOSINOPHIL # BLD AUTO: 0.11 X10(3) UL (ref 0–0.7)
EOSINOPHIL NFR BLD AUTO: 1.3 %
ERYTHROCYTE [DISTWIDTH] IN BLOOD BY AUTOMATED COUNT: 14.5 %
GFR SERPLBLD BASED ON 1.73 SQ M-ARVRAT: 67 ML/MIN/1.73M2 (ref 60–?)
GLUCOSE BLD-MCNC: 123 MG/DL (ref 70–99)
GLUCOSE BLD-MCNC: 126 MG/DL (ref 70–99)
GLUCOSE BLD-MCNC: 146 MG/DL (ref 70–99)
HCT VFR BLD AUTO: 38.2 %
HGB BLD-MCNC: 11.9 G/DL
IMM GRANULOCYTES # BLD AUTO: 0.03 X10(3) UL (ref 0–1)
IMM GRANULOCYTES NFR BLD: 0.4 %
LYMPHOCYTES # BLD AUTO: 1.11 X10(3) UL (ref 1–4)
LYMPHOCYTES NFR BLD AUTO: 13.3 %
MCH RBC QN AUTO: 28.4 PG (ref 26–34)
MCHC RBC AUTO-ENTMCNC: 31.2 G/DL (ref 31–37)
MCV RBC AUTO: 91.2 FL
MONOCYTES # BLD AUTO: 0.81 X10(3) UL (ref 0.1–1)
MONOCYTES NFR BLD AUTO: 9.7 %
NEUTROPHILS # BLD AUTO: 6.26 X10 (3) UL (ref 1.5–7.7)
NEUTROPHILS # BLD AUTO: 6.26 X10(3) UL (ref 1.5–7.7)
NEUTROPHILS NFR BLD AUTO: 75.1 %
OSMOLALITY SERPL CALC.SUM OF ELEC: 301 MOSM/KG (ref 275–295)
PLATELET # BLD AUTO: 455 10(3)UL (ref 150–450)
POTASSIUM SERPL-SCNC: 3.9 MMOL/L (ref 3.5–5.1)
RBC # BLD AUTO: 4.19 X10(6)UL
SODIUM SERPL-SCNC: 142 MMOL/L (ref 136–145)
TROPONIN I HIGH SENSITIVITY: 414 NG/L
WBC # BLD AUTO: 8.3 X10(3) UL (ref 4–11)

## 2023-02-10 PROCEDURE — 93306 TTE W/DOPPLER COMPLETE: CPT | Performed by: INTERNAL MEDICINE

## 2023-02-10 PROCEDURE — 84484 ASSAY OF TROPONIN QUANT: CPT | Performed by: INTERNAL MEDICINE

## 2023-02-10 PROCEDURE — 85025 COMPLETE CBC W/AUTO DIFF WBC: CPT | Performed by: INTERNAL MEDICINE

## 2023-02-10 PROCEDURE — 92610 EVALUATE SWALLOWING FUNCTION: CPT

## 2023-02-10 PROCEDURE — 82962 GLUCOSE BLOOD TEST: CPT

## 2023-02-10 PROCEDURE — 80048 BASIC METABOLIC PNL TOTAL CA: CPT | Performed by: INTERNAL MEDICINE

## 2023-02-10 NOTE — PROGRESS NOTES
Transfer to [address] Report called to RN and all questions answered. Bedside swallow eval with SLP did well today and plan for video swallow tomorrow. NPO for now. All meds per G tube. Bp controlled now. HR remains ST rates 110-130. Highest HR on shift was 164. Cards aware and following but assuming HR will continue to settle as infection is treated. Zosyn for pyleoneph. q2 turn. Bedbound. Right side flaccid and exp aphasia per baseline. Updated family on plan of care and condition update.

## 2023-02-10 NOTE — PLAN OF CARE
Assumed care of pt at [address], pt with temp 100.1 with -150 with off going Rn giving Tylenol. Recheck shows temp 99.3, hr remains elevated 120's with sbp 160's pt asymptomatic, scheduled meds given. Pt denies pain. Lungs coarse bilaterally. Sat 95% on room air. Remains on tubefeed at goal rate. Abdomen soft bs present. Pt denies nausea. Pt is aphasic but nods yes and no to questions. On ivf and iv antibx for UTI. LA improved. Pt is npo, swallow eval ordered for tomorrow. Hob 35 degrees. Kirkland in place draining ben urine. BR turning Q2h.  bp 102/59 at present, hydralzine held at 2200. Will continue to monitor.

## 2023-02-10 NOTE — PROGRESS NOTES
NURSING ADMISSION NOTE      Patient admitted via Cart  Oriented to room. Safety precautions initiated. Bed in low position. Call light in reach. Pt admitted from ED for pyelonephritis. Alert, oriented x3, expressive aphasia, nods/shakes head appropriately. NPO, but per pt's wife, pt does eat small amounts with no issue, SLP consulted for eval. PEG tube, tube feeds to start tonight. IVF. IV abx. Castillo on admit was occluded, with abdominal pain, castillo exchanged and 500mL of hazy sediment urine returned. ST on tele. BP elevated, home meds given, will monitor. POC reviewed with pt and fiance at bedside, call light within reach. This patients infusion is delayed, please reschedule for next week

## 2023-02-11 ENCOUNTER — APPOINTMENT (OUTPATIENT)
Dept: GENERAL RADIOLOGY | Facility: HOSPITAL | Age: 53
End: 2023-02-11
Attending: INTERNAL MEDICINE
Payer: MEDICAID

## 2023-02-11 LAB
ANION GAP SERPL CALC-SCNC: 7 MMOL/L (ref 0–18)
BASOPHILS # BLD AUTO: 0.03 X10(3) UL (ref 0–0.2)
BASOPHILS NFR BLD AUTO: 0.4 %
BUN BLD-MCNC: 27 MG/DL (ref 7–18)
CALCIUM BLD-MCNC: 9.2 MG/DL (ref 8.5–10.1)
CHLORIDE SERPL-SCNC: 113 MMOL/L (ref 98–112)
CO2 SERPL-SCNC: 25 MMOL/L (ref 21–32)
CREAT BLD-MCNC: 1.23 MG/DL
EOSINOPHIL # BLD AUTO: 0.28 X10(3) UL (ref 0–0.7)
EOSINOPHIL NFR BLD AUTO: 3.5 %
ERYTHROCYTE [DISTWIDTH] IN BLOOD BY AUTOMATED COUNT: 14.6 %
GFR SERPLBLD BASED ON 1.73 SQ M-ARVRAT: 69 ML/MIN/1.73M2 (ref 60–?)
GLUCOSE BLD-MCNC: 121 MG/DL (ref 70–99)
GLUCOSE BLD-MCNC: 129 MG/DL (ref 70–99)
HCT VFR BLD AUTO: 32.5 %
HGB BLD-MCNC: 10.2 G/DL
IMM GRANULOCYTES # BLD AUTO: 0.03 X10(3) UL (ref 0–1)
IMM GRANULOCYTES NFR BLD: 0.4 %
LYMPHOCYTES # BLD AUTO: 1 X10(3) UL (ref 1–4)
LYMPHOCYTES NFR BLD AUTO: 12.6 %
MCH RBC QN AUTO: 28.8 PG (ref 26–34)
MCHC RBC AUTO-ENTMCNC: 31.4 G/DL (ref 31–37)
MCV RBC AUTO: 91.8 FL
MONOCYTES # BLD AUTO: 0.82 X10(3) UL (ref 0.1–1)
MONOCYTES NFR BLD AUTO: 10.4 %
NEUTROPHILS # BLD AUTO: 5.76 X10 (3) UL (ref 1.5–7.7)
NEUTROPHILS # BLD AUTO: 5.76 X10(3) UL (ref 1.5–7.7)
NEUTROPHILS NFR BLD AUTO: 72.7 %
OSMOLALITY SERPL CALC.SUM OF ELEC: 306 MOSM/KG (ref 275–295)
PLATELET # BLD AUTO: 413 10(3)UL (ref 150–450)
POTASSIUM SERPL-SCNC: 3.7 MMOL/L (ref 3.5–5.1)
RBC # BLD AUTO: 3.54 X10(6)UL
SODIUM SERPL-SCNC: 145 MMOL/L (ref 136–145)
WBC # BLD AUTO: 7.9 X10(3) UL (ref 4–11)

## 2023-02-11 PROCEDURE — 92611 MOTION FLUOROSCOPY/SWALLOW: CPT

## 2023-02-11 PROCEDURE — 85025 COMPLETE CBC W/AUTO DIFF WBC: CPT | Performed by: INTERNAL MEDICINE

## 2023-02-11 PROCEDURE — 80048 BASIC METABOLIC PNL TOTAL CA: CPT | Performed by: INTERNAL MEDICINE

## 2023-02-11 PROCEDURE — 82962 GLUCOSE BLOOD TEST: CPT

## 2023-02-11 PROCEDURE — 74230 X-RAY XM SWLNG FUNCJ C+: CPT | Performed by: INTERNAL MEDICINE

## 2023-02-11 RX ORDER — LISINOPRIL 20 MG/1
20 TABLET ORAL DAILY
Status: DISCONTINUED | OUTPATIENT
Start: 2023-02-11 | End: 2023-02-16

## 2023-02-11 RX ORDER — HYDRALAZINE HYDROCHLORIDE 50 MG/1
50 TABLET, FILM COATED ORAL EVERY 8 HOURS SCHEDULED
Status: DISCONTINUED | OUTPATIENT
Start: 2023-02-11 | End: 2023-02-12

## 2023-02-11 NOTE — PHYSICAL THERAPY NOTE
Physical Therapy    Order for PT eval received. Pt was evaluated by this therapist in October 2022. Pt is dependent transfer w/ lift equipment every other day to a wheelchair. Spends ~ 4 hours in the chair when up. Pt does use L ue to manage his remote, move items etc. Pt is currently at his baseline level of function, does not require skilled PT intervention. Will sign off of case.

## 2023-02-11 NOTE — OCCUPATIONAL THERAPY NOTE
OT orders received and pt chart reviewed. Pt is DEP for ADLs and t/f at baseline. Pt reports no changes from baseline related to ADLs and transfers, and declines OT this admission. OT will sign off.

## 2023-02-12 LAB
ANION GAP SERPL CALC-SCNC: 5 MMOL/L (ref 0–18)
BASOPHILS # BLD AUTO: 0.05 X10(3) UL (ref 0–0.2)
BASOPHILS NFR BLD AUTO: 0.6 %
BUN BLD-MCNC: 18 MG/DL (ref 7–18)
CALCIUM BLD-MCNC: 9.4 MG/DL (ref 8.5–10.1)
CHLORIDE SERPL-SCNC: 114 MMOL/L (ref 98–112)
CO2 SERPL-SCNC: 26 MMOL/L (ref 21–32)
CREAT BLD-MCNC: 0.89 MG/DL
EOSINOPHIL # BLD AUTO: 0.29 X10(3) UL (ref 0–0.7)
EOSINOPHIL NFR BLD AUTO: 3.7 %
ERYTHROCYTE [DISTWIDTH] IN BLOOD BY AUTOMATED COUNT: 14.3 %
GFR SERPLBLD BASED ON 1.73 SQ M-ARVRAT: 101 ML/MIN/1.73M2 (ref 60–?)
GLUCOSE BLD-MCNC: 103 MG/DL (ref 70–99)
GLUCOSE BLD-MCNC: 98 MG/DL (ref 70–99)
HCT VFR BLD AUTO: 33.9 %
HGB BLD-MCNC: 10.4 G/DL
IMM GRANULOCYTES # BLD AUTO: 0.02 X10(3) UL (ref 0–1)
IMM GRANULOCYTES NFR BLD: 0.3 %
LYMPHOCYTES # BLD AUTO: 0.98 X10(3) UL (ref 1–4)
LYMPHOCYTES NFR BLD AUTO: 12.5 %
MCH RBC QN AUTO: 28.9 PG (ref 26–34)
MCHC RBC AUTO-ENTMCNC: 30.7 G/DL (ref 31–37)
MCV RBC AUTO: 94.2 FL
MONOCYTES # BLD AUTO: 0.56 X10(3) UL (ref 0.1–1)
MONOCYTES NFR BLD AUTO: 7.2 %
NEUTROPHILS # BLD AUTO: 5.92 X10 (3) UL (ref 1.5–7.7)
NEUTROPHILS # BLD AUTO: 5.92 X10(3) UL (ref 1.5–7.7)
NEUTROPHILS NFR BLD AUTO: 75.7 %
OSMOLALITY SERPL CALC.SUM OF ELEC: 302 MOSM/KG (ref 275–295)
PLATELET # BLD AUTO: 407 10(3)UL (ref 150–450)
POTASSIUM SERPL-SCNC: 3.6 MMOL/L (ref 3.5–5.1)
RBC # BLD AUTO: 3.6 X10(6)UL
SODIUM SERPL-SCNC: 145 MMOL/L (ref 136–145)
WBC # BLD AUTO: 7.8 X10(3) UL (ref 4–11)

## 2023-02-12 PROCEDURE — 82962 GLUCOSE BLOOD TEST: CPT

## 2023-02-12 PROCEDURE — 80048 BASIC METABOLIC PNL TOTAL CA: CPT | Performed by: HOSPITALIST

## 2023-02-12 PROCEDURE — 85025 COMPLETE CBC W/AUTO DIFF WBC: CPT | Performed by: HOSPITALIST

## 2023-02-12 RX ORDER — HYDRALAZINE HYDROCHLORIDE 50 MG/1
100 TABLET, FILM COATED ORAL EVERY 8 HOURS SCHEDULED
Status: DISCONTINUED | OUTPATIENT
Start: 2023-02-12 | End: 2023-02-16

## 2023-02-12 RX ORDER — HYDRALAZINE HYDROCHLORIDE 25 MG/1
25 TABLET, FILM COATED ORAL ONCE
Status: COMPLETED | OUTPATIENT
Start: 2023-02-12 | End: 2023-02-12

## 2023-02-12 NOTE — PLAN OF CARE
Assumed care of pt around 1900. SU orientation d/t aphasia. Shakes/nods head yes/no. BP elevated overnight. Scheduled Terazosin, Metoprolol, and Hydralazine. Meds via G tube. Continuous tube feeding infusing. Update: stopped at 2am per MD order. Merrem as ordered. Contact isolation precautions. Kirkland in place & intact. Resting in bed w/ safety precautions in place & call light within reach. Large orange/red-tinged emesis around [place] Single Dr. Parvin Rose notified. PRN Zofran given. CBC & BMP ordered. TF on hold until morning rounds. Plan to hold Xarelto if needed.

## 2023-02-12 NOTE — PLAN OF CARE
Assumed care of pt 0730  A+Ox2-3, RA, NSR/ST, no c/o pain   -oriented to self, situation   -follows commands, gestures appropriately  Rounding increased due to aphasia  Call light in reach  Repositioned, HOB upright  Restarted TF per phsx  ABX per order

## 2023-02-12 NOTE — PLAN OF CARE
Assumed patient care at 0700  Tele; NSR/ST on tele.   HR 100s  Urine cultures resulted; ID consulted; abx adjusted  Video swallow completed; NPO  IVF infusing per order  TF infusing via G-tube  Kirkland intact  Plan for possible stress test Monday per Cards

## 2023-02-13 ENCOUNTER — APPOINTMENT (OUTPATIENT)
Dept: GENERAL RADIOLOGY | Facility: HOSPITAL | Age: 53
End: 2023-02-13
Attending: HOSPITALIST
Payer: MEDICAID

## 2023-02-13 ENCOUNTER — APPOINTMENT (OUTPATIENT)
Dept: CV DIAGNOSTICS | Facility: HOSPITAL | Age: 53
End: 2023-02-13
Attending: INTERNAL MEDICINE
Payer: MEDICAID

## 2023-02-13 LAB
GLUCOSE BLD-MCNC: 109 MG/DL (ref 70–99)
GLUCOSE BLD-MCNC: 110 MG/DL (ref 70–99)
GLUCOSE BLD-MCNC: 114 MG/DL (ref 70–99)
GLUCOSE BLD-MCNC: 93 MG/DL (ref 70–99)

## 2023-02-13 PROCEDURE — 93017 CV STRESS TEST TRACING ONLY: CPT | Performed by: INTERNAL MEDICINE

## 2023-02-13 PROCEDURE — 82962 GLUCOSE BLOOD TEST: CPT

## 2023-02-13 PROCEDURE — 78452 HT MUSCLE IMAGE SPECT MULT: CPT | Performed by: INTERNAL MEDICINE

## 2023-02-13 PROCEDURE — 36410 VNPNXR 3YR/> PHY/QHP DX/THER: CPT

## 2023-02-13 PROCEDURE — 74018 RADEX ABDOMEN 1 VIEW: CPT | Performed by: HOSPITALIST

## 2023-02-13 PROCEDURE — 93018 CV STRESS TEST I&R ONLY: CPT | Performed by: INTERNAL MEDICINE

## 2023-02-13 RX ORDER — CARVEDILOL 12.5 MG/1
12.5 TABLET ORAL 2 TIMES DAILY WITH MEALS
Status: DISCONTINUED | OUTPATIENT
Start: 2023-02-13 | End: 2023-02-14

## 2023-02-13 RX ORDER — LABETALOL HYDROCHLORIDE 5 MG/ML
10 INJECTION, SOLUTION INTRAVENOUS EVERY 6 HOURS PRN
Status: DISCONTINUED | OUTPATIENT
Start: 2023-02-13 | End: 2023-02-14

## 2023-02-13 RX ORDER — METOCLOPRAMIDE HYDROCHLORIDE 5 MG/ML
10 INJECTION INTRAMUSCULAR; INTRAVENOUS EVERY 6 HOURS PRN
Status: DISCONTINUED | OUTPATIENT
Start: 2023-02-13 | End: 2023-02-16

## 2023-02-13 RX ORDER — ERTAPENEM 1 G/1
1 INJECTION, POWDER, LYOPHILIZED, FOR SOLUTION INTRAMUSCULAR; INTRAVENOUS DAILY
Qty: 8 EACH | Refills: 0 | Status: SHIPPED | OUTPATIENT
Start: 2023-02-13 | End: 2023-02-16

## 2023-02-13 RX ORDER — ROSUVASTATIN CALCIUM 20 MG/1
20 TABLET, COATED ORAL NIGHTLY
Status: DISCONTINUED | OUTPATIENT
Start: 2023-02-13 | End: 2023-02-13

## 2023-02-13 RX ORDER — AMLODIPINE BESYLATE 5 MG/1
5 TABLET ORAL DAILY
Status: DISCONTINUED | OUTPATIENT
Start: 2023-02-13 | End: 2023-02-14

## 2023-02-13 RX ORDER — ROSUVASTATIN CALCIUM 20 MG/1
20 TABLET, COATED ORAL NIGHTLY
Status: DISCONTINUED | OUTPATIENT
Start: 2023-02-13 | End: 2023-02-16

## 2023-02-13 NOTE — PLAN OF CARE
Assumed care at 1900. 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 NSR. RA. Blood pressure controlled with scheduled meds  Denies pain.    Tube feeds; Jevity 1.5 @ 60ml/hr  PEG tube site; CDI  IVF infusing 0.9@ 75 ml/hr  Mepilex on sacrum; CDI

## 2023-02-13 NOTE — PLAN OF CARE
Assumed care of pt 0730  A+Ox2-3*, RA, NSR/ST, no c/o pain  *expressive aphasia  Stress test complete  Cards updated pt/family on plan, alerted to BP   -possible DC, f/u cath   -ID s/o, Invanz to be given prior to DC   -midline placed for ABX on DC  NPO maintained per speech note  Xarelto per order  Pt/family updated on plan of care

## 2023-02-13 NOTE — PLAN OF CARE
Preliminary Nuclear Stress Test results as called to me by Dr. Molly York in radiology:    LVEF 78%. Large size, moderately severe, reversible defect to lateral and basal 1/2 of the inferior wall consistent with ischemia. Discussed with Dr. David Mac, likely plan for [facility] tomorrow +/- PCI. He will come discuss with the patient later today.     MYRON Evans

## 2023-02-13 NOTE — PROGRESS NOTES
CARDIODIAGNOSTIC PRELIMINARY REPORT:     Ashlee Needles completed,tolerated well     Second set of images pending

## 2023-02-14 LAB
GLUCOSE BLD-MCNC: 105 MG/DL (ref 70–99)
GLUCOSE BLD-MCNC: 106 MG/DL (ref 70–99)
GLUCOSE BLD-MCNC: 119 MG/DL (ref 70–99)
GLUCOSE BLD-MCNC: 93 MG/DL (ref 70–99)

## 2023-02-14 PROCEDURE — 82962 GLUCOSE BLOOD TEST: CPT

## 2023-02-14 RX ORDER — CARVEDILOL 12.5 MG/1
25 TABLET ORAL 2 TIMES DAILY WITH MEALS
Status: DISCONTINUED | OUTPATIENT
Start: 2023-02-14 | End: 2023-02-16

## 2023-02-14 RX ORDER — AMLODIPINE BESYLATE 10 MG/1
10 TABLET ORAL DAILY
Status: DISCONTINUED | OUTPATIENT
Start: 2023-02-14 | End: 2023-02-16

## 2023-02-14 RX ORDER — LABETALOL HYDROCHLORIDE 5 MG/ML
10 INJECTION, SOLUTION INTRAVENOUS EVERY 4 HOURS PRN
Status: DISCONTINUED | OUTPATIENT
Start: 2023-02-14 | End: 2023-02-16

## 2023-02-14 RX ORDER — CARVEDILOL 12.5 MG/1
12.5 TABLET ORAL ONCE
Status: COMPLETED | OUTPATIENT
Start: 2023-02-14 | End: 2023-02-14

## 2023-02-14 RX ORDER — BISACODYL 10 MG
10 SUPPOSITORY, RECTAL RECTAL
Status: DISCONTINUED | OUTPATIENT
Start: 2023-02-14 | End: 2023-02-16

## 2023-02-14 NOTE — PLAN OF CARE
Assumed care of pt 0730  A+Ox2-3*, RA, NSR/ST, no c/o pain  *expressive aphasia  No further N/V/D in AM  180 SBP in AM   -cards notified   -medications adjusted  +++ target  per Kimmy Angst +++  Meds given per order  HR/BP improved  TF restarted at halved rate per Hspx   -tolerating well  No BM on shift   -per Hspx, give supp to prevent N/V  ID s/o   -given Invanz x1 prior to DC  Pt updated on plan of care

## 2023-02-14 NOTE — PLAN OF CARE
Assumed care at 1900. Aox4  Aphasic  NSR. RA. High blood pressure overnight; labetalol given x2  Denies pain. I&O WDL.   Emesis x3; tube feeds stopped; xray abdomen: no blockage  IV fluids stopped

## 2023-02-15 LAB
ANION GAP SERPL CALC-SCNC: 7 MMOL/L (ref 0–18)
BASOPHILS # BLD AUTO: 0.03 X10(3) UL (ref 0–0.2)
BASOPHILS NFR BLD AUTO: 0.4 %
BUN BLD-MCNC: 18 MG/DL (ref 7–18)
CALCIUM BLD-MCNC: 9 MG/DL (ref 8.5–10.1)
CHLORIDE SERPL-SCNC: 111 MMOL/L (ref 98–112)
CO2 SERPL-SCNC: 24 MMOL/L (ref 21–32)
CREAT BLD-MCNC: 0.83 MG/DL
EOSINOPHIL # BLD AUTO: 0.32 X10(3) UL (ref 0–0.7)
EOSINOPHIL NFR BLD AUTO: 4.3 %
ERYTHROCYTE [DISTWIDTH] IN BLOOD BY AUTOMATED COUNT: 13.7 %
GFR SERPLBLD BASED ON 1.73 SQ M-ARVRAT: 103 ML/MIN/1.73M2 (ref 60–?)
GLUCOSE BLD-MCNC: 112 MG/DL (ref 70–99)
GLUCOSE BLD-MCNC: 117 MG/DL (ref 70–99)
GLUCOSE BLD-MCNC: 117 MG/DL (ref 70–99)
GLUCOSE BLD-MCNC: 120 MG/DL (ref 70–99)
GLUCOSE BLD-MCNC: 98 MG/DL (ref 70–99)
HCT VFR BLD AUTO: 31.4 %
HGB BLD-MCNC: 10.1 G/DL
IMM GRANULOCYTES # BLD AUTO: 0.02 X10(3) UL (ref 0–1)
IMM GRANULOCYTES NFR BLD: 0.3 %
LYMPHOCYTES # BLD AUTO: 1.17 X10(3) UL (ref 1–4)
LYMPHOCYTES NFR BLD AUTO: 15.6 %
MCH RBC QN AUTO: 29.1 PG (ref 26–34)
MCHC RBC AUTO-ENTMCNC: 32.2 G/DL (ref 31–37)
MCV RBC AUTO: 90.5 FL
MONOCYTES # BLD AUTO: 0.66 X10(3) UL (ref 0.1–1)
MONOCYTES NFR BLD AUTO: 8.8 %
NEUTROPHILS # BLD AUTO: 5.32 X10 (3) UL (ref 1.5–7.7)
NEUTROPHILS # BLD AUTO: 5.32 X10(3) UL (ref 1.5–7.7)
NEUTROPHILS NFR BLD AUTO: 70.6 %
OSMOLALITY SERPL CALC.SUM OF ELEC: 297 MOSM/KG (ref 275–295)
PLATELET # BLD AUTO: 433 10(3)UL (ref 150–450)
POTASSIUM SERPL-SCNC: 3.2 MMOL/L (ref 3.5–5.1)
RBC # BLD AUTO: 3.47 X10(6)UL
SODIUM SERPL-SCNC: 142 MMOL/L (ref 136–145)
WBC # BLD AUTO: 7.5 X10(3) UL (ref 4–11)

## 2023-02-15 PROCEDURE — 85025 COMPLETE CBC W/AUTO DIFF WBC: CPT | Performed by: INTERNAL MEDICINE

## 2023-02-15 PROCEDURE — 80048 BASIC METABOLIC PNL TOTAL CA: CPT | Performed by: INTERNAL MEDICINE

## 2023-02-15 PROCEDURE — 82962 GLUCOSE BLOOD TEST: CPT

## 2023-02-15 RX ORDER — SODIUM CHLORIDE 9 MG/ML
INJECTION, SOLUTION INTRAVENOUS CONTINUOUS
Status: ACTIVE | OUTPATIENT
Start: 2023-02-15 | End: 2023-02-15

## 2023-02-15 NOTE — PLAN OF CARE
Assumed care at 1900. Aox4. Aphasic from prior CVA  VSS. NSR. RA. Blood pressures under control overnight  Denies pain. Tolerating TF @ 30 ml/hr  I&O WDL.   IVF infusing 0.9 @ 50 ml/hr

## 2023-02-15 NOTE — PLAN OF CARE
Assumed care @ 0730. Alert and oriented; aphasic due to prior CVA. Neuro's intact, see flowsheets. NSR on tele, RA, VSS. Patient denies pain. IV abx given per order. K+ replaced per order. Advancing TF as tolerated. Kirkland in place w/ adequate urine output. Plan to discharge to SNF possibly tomorrow w/ IV abx. Patient updated on plan of care. Bed alarm on, call light within reach. Monitoring patient needs.

## 2023-02-16 ENCOUNTER — APPOINTMENT (OUTPATIENT)
Dept: GENERAL RADIOLOGY | Facility: HOSPITAL | Age: 53
End: 2023-02-16
Attending: INTERNAL MEDICINE
Payer: MEDICAID

## 2023-02-16 VITALS
WEIGHT: 205 LBS | BODY MASS INDEX: 29 KG/M2 | OXYGEN SATURATION: 97 % | SYSTOLIC BLOOD PRESSURE: 161 MMHG | TEMPERATURE: 99 F | RESPIRATION RATE: 16 BRPM | DIASTOLIC BLOOD PRESSURE: 75 MMHG | HEART RATE: 92 BPM

## 2023-02-16 LAB
GLUCOSE BLD-MCNC: 104 MG/DL (ref 70–99)
GLUCOSE BLD-MCNC: 109 MG/DL (ref 70–99)
GLUCOSE BLD-MCNC: 114 MG/DL (ref 70–99)
POTASSIUM SERPL-SCNC: 3.6 MMOL/L (ref 3.5–5.1)
SARS-COV-2 RNA RESP QL NAA+PROBE: NOT DETECTED

## 2023-02-16 PROCEDURE — 92611 MOTION FLUOROSCOPY/SWALLOW: CPT

## 2023-02-16 PROCEDURE — 84132 ASSAY OF SERUM POTASSIUM: CPT | Performed by: INTERNAL MEDICINE

## 2023-02-16 PROCEDURE — 74230 X-RAY XM SWLNG FUNCJ C+: CPT | Performed by: INTERNAL MEDICINE

## 2023-02-16 PROCEDURE — 82962 GLUCOSE BLOOD TEST: CPT

## 2023-02-16 PROCEDURE — 92610 EVALUATE SWALLOWING FUNCTION: CPT

## 2023-02-16 RX ORDER — ERTAPENEM 1 G/1
1 INJECTION, POWDER, LYOPHILIZED, FOR SOLUTION INTRAMUSCULAR; INTRAVENOUS DAILY
Qty: 4 EACH | Refills: 0 | Status: SHIPPED | OUTPATIENT
Start: 2023-02-16 | End: 2023-02-20

## 2023-02-16 RX ORDER — ROSUVASTATIN CALCIUM 20 MG/1
20 TABLET, COATED ORAL NIGHTLY
Qty: 30 TABLET | Refills: 0 | Status: SHIPPED | OUTPATIENT
Start: 2023-02-16

## 2023-02-16 RX ORDER — LISINOPRIL 20 MG/1
20 TABLET ORAL DAILY
Status: SHIPPED | COMMUNITY
Start: 2023-02-16

## 2023-02-16 RX ORDER — CARVEDILOL 25 MG/1
25 TABLET ORAL 2 TIMES DAILY WITH MEALS
Qty: 60 TABLET | Refills: 0 | Status: SHIPPED | OUTPATIENT
Start: 2023-02-16

## 2023-02-16 NOTE — PLAN OF CARE
Assumed care at [address].    A&Ox4, RA, NSR on tele  Qshift neuro, no new deficits   No complaints of pain   IV abx given per order  Kirkland w/ ben output   Tolerating TF at goal   Call light within reach    Patient updated on plan of care

## 2023-02-16 NOTE — CM/SW NOTE
Updated progress notes sent to [facility] in [facility]. Aware of dc with iv abx through midline. Awaiting response, possible dc today. Addendum: Pt cleared for dc, SW set up amb transport for 5:30 pm, PCS completed. covid test needed prior to dc. RN to call [phone] for report.      EDGARD Wick  Discharge [facility]

## 2023-02-16 NOTE — PLAN OF CARE
NURSING DISCHARGE NOTE    Discharged Nursing home via Ambulance. Accompanied by Support staff  Belongings Taken by patient/family. Assumed care @7445  VSS,   A&Ox4, global aphasia. R sided flaccid/R hand contracted  RA  strong cough at times  NSR ,   Denies Pain,   Total lift. Tolerating tube feeds diet. No nausea/vomiting. Kirkland intact with adequate output. IVF infusing abx  Refusing nystatin    Discharge navigator complete. Discussed POC with [facility] TREVON RN. All questions answered. Problem: CARDIOVASCULAR - ADULT  Goal: Maintains optimal cardiac output and hemodynamic stability  Description: INTERVENTIONS:  - Monitor vital signs, rhythm, and trends  - Monitor for bleeding, hypotension and signs of decreased cardiac output  - Evaluate effectiveness of vasoactive medications to optimize hemodynamic stability  - Monitor arterial and/or venous puncture sites for bleeding and/or hematoma  - Assess quality of pulses, skin color and temperature  - Assess for signs of decreased coronary artery perfusion - ex.  Angina  - Evaluate fluid balance, assess for edema, trend weights  Outcome: Adequate for Discharge  Goal: Absence of cardiac arrhythmias or at baseline  Description: INTERVENTIONS:  - Continuous cardiac monitoring, monitor vital signs, obtain 12 lead EKG if indicated  - Evaluate effectiveness of antiarrhythmic and heart rate control medications as ordered  - Initiate emergency measures for life threatening arrhythmias  - Monitor electrolytes and administer replacement therapy as ordered  Outcome: Adequate for Discharge

## 2023-04-14 ENCOUNTER — HOSPITAL ENCOUNTER (EMERGENCY)
Facility: HOSPITAL | Age: 53
Discharge: HOME | End: 2023-04-14
Attending: STUDENT IN AN ORGANIZED HEALTH CARE EDUCATION/TRAINING PROGRAM
Payer: MEDICAID

## 2023-04-14 VITALS
BODY MASS INDEX: 29 KG/M2 | DIASTOLIC BLOOD PRESSURE: 82 MMHG | SYSTOLIC BLOOD PRESSURE: 174 MMHG | WEIGHT: 207.25 LBS | OXYGEN SATURATION: 99 % | HEART RATE: 88 BPM | RESPIRATION RATE: 20 BRPM | TEMPERATURE: 99 F

## 2023-04-14 DIAGNOSIS — N47.6 BALANOPOSTHITIS: ICD-10-CM

## 2023-04-14 DIAGNOSIS — N30.00 ACUTE CYSTITIS WITHOUT HEMATURIA: Primary | ICD-10-CM

## 2023-04-14 LAB
BILIRUB UR QL STRIP.AUTO: NEGATIVE
COLOR UR AUTO: YELLOW
GLUCOSE UR STRIP.AUTO-MCNC: NEGATIVE MG/DL
KETONES UR STRIP.AUTO-MCNC: NEGATIVE MG/DL
NITRITE UR QL STRIP.AUTO: NEGATIVE
PH UR STRIP.AUTO: 8 [PH] (ref 5–8)
PROT UR STRIP.AUTO-MCNC: 30 MG/DL
RBC #/AREA URNS AUTO: >10 /HPF
SP GR UR STRIP.AUTO: 1 (ref 1–1.03)
UROBILINOGEN UR STRIP.AUTO-MCNC: <2 MG/DL
WBC #/AREA URNS AUTO: >50 /HPF
WBC CLUMPS UR QL AUTO: PRESENT /HPF

## 2023-04-14 PROCEDURE — 87184 SC STD DISK METHOD PER PLATE: CPT | Performed by: STUDENT IN AN ORGANIZED HEALTH CARE EDUCATION/TRAINING PROGRAM

## 2023-04-14 PROCEDURE — 87186 SC STD MICRODIL/AGAR DIL: CPT | Performed by: STUDENT IN AN ORGANIZED HEALTH CARE EDUCATION/TRAINING PROGRAM

## 2023-04-14 PROCEDURE — 87086 URINE CULTURE/COLONY COUNT: CPT | Performed by: STUDENT IN AN ORGANIZED HEALTH CARE EDUCATION/TRAINING PROGRAM

## 2023-04-14 PROCEDURE — 81001 URINALYSIS AUTO W/SCOPE: CPT | Performed by: STUDENT IN AN ORGANIZED HEALTH CARE EDUCATION/TRAINING PROGRAM

## 2023-04-14 PROCEDURE — 87077 CULTURE AEROBIC IDENTIFY: CPT | Performed by: STUDENT IN AN ORGANIZED HEALTH CARE EDUCATION/TRAINING PROGRAM

## 2023-04-14 PROCEDURE — 99283 EMERGENCY DEPT VISIT LOW MDM: CPT | Performed by: STUDENT IN AN ORGANIZED HEALTH CARE EDUCATION/TRAINING PROGRAM

## 2023-04-14 PROCEDURE — 51702 INSERT TEMP BLADDER CATH: CPT | Performed by: STUDENT IN AN ORGANIZED HEALTH CARE EDUCATION/TRAINING PROGRAM

## 2023-04-14 RX ORDER — CIPROFLOXACIN 500 MG/1
500 TABLET, FILM COATED ORAL 2 TIMES DAILY
Qty: 20 TABLET | Refills: 0 | Status: ON HOLD | OUTPATIENT
Start: 2023-04-14 | End: 2023-04-24

## 2023-04-14 RX ORDER — CLOTRIMAZOLE 1 %
1 CREAM (GRAM) TOPICAL 2 TIMES DAILY
Qty: 1 EACH | Refills: 0 | Status: ON HOLD | OUTPATIENT
Start: 2023-04-14

## 2023-04-14 RX ORDER — LIDOCAINE HYDROCHLORIDE 20 MG/ML
10 JELLY TOPICAL ONCE
Status: COMPLETED | OUTPATIENT
Start: 2023-04-14 | End: 2023-04-14

## 2023-04-14 RX ORDER — LIDOCAINE HYDROCHLORIDE 20 MG/ML
JELLY TOPICAL
Status: COMPLETED
Start: 2023-04-14 | End: 2023-04-14

## 2023-04-14 RX ORDER — CIPROFLOXACIN 500 MG/1
500 TABLET, FILM COATED ORAL ONCE
Status: COMPLETED | OUTPATIENT
Start: 2023-04-14 | End: 2023-04-14

## 2023-04-14 NOTE — ED QUICK NOTES
[facility] HERE [facility] [id number] [facility] PT PAPERWORK GIVEN TO THEM.

## 2023-04-14 NOTE — ED QUICK NOTES
Rounding Completed  Family at bedside. Plan of Care reviewed. Waiting for UA results. Elimination needs assessed- brief and bedding changed after BM. Urethral catheter exchanged peer physician order. Urine sample obtained and sent to lab. Pt resting comfortably on cot. Bed is locked and in lowest position. Call light within reach.

## 2023-04-14 NOTE — ED INITIAL ASSESSMENT (HPI)
from [facility] with c/o penile pain r/t castillo catheter.     Catheter was changed 3 days PTA

## 2023-04-17 RX ORDER — SULFAMETHOXAZOLE AND TRIMETHOPRIM 800; 160 MG/1; MG/1
1 TABLET ORAL 2 TIMES DAILY
Qty: 20 TABLET | Refills: 0 | Status: ON HOLD | OUTPATIENT
Start: 2023-04-17 | End: 2023-04-27

## 2023-04-17 NOTE — PROGRESS NOTES
ED Pharmacist Discharge Culture Review    Final urine culture positive for ESBL Proteus mirabilis, resistant to prescribed regimen of ciprofloxacin 500 mg PO twice daily x10 days. Results discussed with Dr. Deanna Messer and plan to change therapy to sulfamethoxazole-trimethoprim 800-160mg, one DS tablet PO twice daily x10 days. Results called to nurse at [facility] and report faxed to [phone]  per RN's request. No further culture follow-up required.     Thank you,  Nusrat Schaefer, PharmD, BCPS  04/17/23; 1:51 PM

## 2023-04-22 ENCOUNTER — HOSPITAL ENCOUNTER (INPATIENT)
Facility: HOSPITAL | Age: 53
LOS: 3 days | Discharge: NURSING, REHAB OR LONG-TERM CARE | DRG: 699 | End: 2023-04-26
Attending: EMERGENCY MEDICINE | Admitting: INTERNAL MEDICINE
Payer: MEDICAID

## 2023-04-22 ENCOUNTER — APPOINTMENT (OUTPATIENT)
Dept: GENERAL RADIOLOGY | Facility: HOSPITAL | Age: 53
DRG: 699 | End: 2023-04-22
Attending: EMERGENCY MEDICINE
Payer: MEDICAID

## 2023-04-22 DIAGNOSIS — N18.9 ACUTE RENAL FAILURE SUPERIMPOSED ON CHRONIC KIDNEY DISEASE, UNSPECIFIED CKD STAGE, UNSPECIFIED ACUTE RENAL FAILURE TYPE (HCC): Primary | ICD-10-CM

## 2023-04-22 DIAGNOSIS — N17.9 ACUTE RENAL FAILURE SUPERIMPOSED ON CHRONIC KIDNEY DISEASE, UNSPECIFIED CKD STAGE, UNSPECIFIED ACUTE RENAL FAILURE TYPE (HCC): Primary | ICD-10-CM

## 2023-04-22 DIAGNOSIS — I95.9 HYPOTENSION, UNSPECIFIED HYPOTENSION TYPE: ICD-10-CM

## 2023-04-22 DIAGNOSIS — N39.0 URINARY TRACT INFECTION WITHOUT HEMATURIA, SITE UNSPECIFIED: ICD-10-CM

## 2023-04-22 LAB
ALBUMIN SERPL-MCNC: 2.9 G/DL (ref 3.4–5)
ALBUMIN/GLOB SERPL: 0.7 {RATIO} (ref 1–2)
ALP LIVER SERPL-CCNC: 101 U/L
ALT SERPL-CCNC: 24 U/L
ANION GAP SERPL CALC-SCNC: 4 MMOL/L (ref 0–18)
AST SERPL-CCNC: 15 U/L (ref 15–37)
BASOPHILS # BLD AUTO: 0.03 X10(3) UL (ref 0–0.2)
BASOPHILS NFR BLD AUTO: 0.5 %
BILIRUB SERPL-MCNC: 0.2 MG/DL (ref 0.1–2)
BUN BLD-MCNC: 29 MG/DL (ref 7–18)
CALCIUM BLD-MCNC: 9.3 MG/DL (ref 8.5–10.1)
CHLORIDE SERPL-SCNC: 108 MMOL/L (ref 98–112)
CO2 SERPL-SCNC: 28 MMOL/L (ref 21–32)
CREAT BLD-MCNC: 1.9 MG/DL
EOSINOPHIL # BLD AUTO: 0.28 X10(3) UL (ref 0–0.7)
EOSINOPHIL NFR BLD AUTO: 4.3 %
ERYTHROCYTE [DISTWIDTH] IN BLOOD BY AUTOMATED COUNT: 13.6 %
GFR SERPLBLD BASED ON 1.73 SQ M-ARVRAT: 41 ML/MIN/1.73M2 (ref 60–?)
GLOBULIN PLAS-MCNC: 4.1 G/DL (ref 2.8–4.4)
GLUCOSE BLD-MCNC: 92 MG/DL (ref 70–99)
HCT VFR BLD AUTO: 32.8 %
HGB BLD-MCNC: 10.4 G/DL
IMM GRANULOCYTES # BLD AUTO: 0.01 X10(3) UL (ref 0–1)
IMM GRANULOCYTES NFR BLD: 0.2 %
LACTATE SERPL-SCNC: 0.8 MMOL/L (ref 0.4–2)
LYMPHOCYTES # BLD AUTO: 1.37 X10(3) UL (ref 1–4)
LYMPHOCYTES NFR BLD AUTO: 20.9 %
MCH RBC QN AUTO: 29.1 PG (ref 26–34)
MCHC RBC AUTO-ENTMCNC: 31.7 G/DL (ref 31–37)
MCV RBC AUTO: 91.6 FL
MONOCYTES # BLD AUTO: 0.76 X10(3) UL (ref 0.1–1)
MONOCYTES NFR BLD AUTO: 11.6 %
NEUTROPHILS # BLD AUTO: 4.12 X10 (3) UL (ref 1.5–7.7)
NEUTROPHILS # BLD AUTO: 4.12 X10(3) UL (ref 1.5–7.7)
NEUTROPHILS NFR BLD AUTO: 62.5 %
OSMOLALITY SERPL CALC.SUM OF ELEC: 295 MOSM/KG (ref 275–295)
PLATELET # BLD AUTO: 366 10(3)UL (ref 150–450)
POTASSIUM SERPL-SCNC: 4.4 MMOL/L (ref 3.5–5.1)
PROT SERPL-MCNC: 7 G/DL (ref 6.4–8.2)
RBC # BLD AUTO: 3.58 X10(6)UL
SODIUM SERPL-SCNC: 140 MMOL/L (ref 136–145)
TROPONIN I HIGH SENSITIVITY: 9 NG/L
WBC # BLD AUTO: 6.6 X10(3) UL (ref 4–11)

## 2023-04-22 PROCEDURE — 71045 X-RAY EXAM CHEST 1 VIEW: CPT | Performed by: EMERGENCY MEDICINE

## 2023-04-22 RX ORDER — ASPIRIN 81 MG/1
TABLET, CHEWABLE ORAL
Status: COMPLETED
Start: 2023-04-22 | End: 2023-04-22

## 2023-04-22 RX ORDER — LIDOCAINE HYDROCHLORIDE 20 MG/ML
JELLY TOPICAL
Status: COMPLETED
Start: 2023-04-22 | End: 2023-04-22

## 2023-04-22 RX ORDER — ONDANSETRON 2 MG/ML
4 INJECTION INTRAMUSCULAR; INTRAVENOUS ONCE
Status: DISCONTINUED | OUTPATIENT
Start: 2023-04-22 | End: 2023-04-26

## 2023-04-22 RX ORDER — LIDOCAINE HYDROCHLORIDE 20 MG/ML
10 JELLY TOPICAL AS NEEDED
Status: DISCONTINUED | OUTPATIENT
Start: 2023-04-22 | End: 2023-04-26

## 2023-04-22 RX ORDER — ASPIRIN 81 MG/1
324 TABLET, CHEWABLE ORAL ONCE
Status: COMPLETED | OUTPATIENT
Start: 2023-04-22 | End: 2023-04-22

## 2023-04-22 RX ORDER — SODIUM CHLORIDE 9 MG/ML
INJECTION, SOLUTION INTRAVENOUS CONTINUOUS
Status: DISCONTINUED | OUTPATIENT
Start: 2023-04-22 | End: 2023-04-26

## 2023-04-23 ENCOUNTER — APPOINTMENT (OUTPATIENT)
Dept: GENERAL RADIOLOGY | Facility: HOSPITAL | Age: 53
DRG: 699 | End: 2023-04-23
Attending: EMERGENCY MEDICINE
Payer: MEDICAID

## 2023-04-23 ENCOUNTER — APPOINTMENT (OUTPATIENT)
Dept: CT IMAGING | Facility: HOSPITAL | Age: 53
DRG: 699 | End: 2023-04-23
Attending: EMERGENCY MEDICINE
Payer: MEDICAID

## 2023-04-23 PROBLEM — N18.9 ACUTE RENAL FAILURE SUPERIMPOSED ON CHRONIC KIDNEY DISEASE, UNSPECIFIED CKD STAGE, UNSPECIFIED ACUTE RENAL FAILURE TYPE (HCC): Status: ACTIVE | Noted: 2023-04-23

## 2023-04-23 PROBLEM — N39.0 URINARY TRACT INFECTION WITHOUT HEMATURIA, SITE UNSPECIFIED: Status: ACTIVE | Noted: 2023-04-23

## 2023-04-23 PROBLEM — N17.9 ACUTE RENAL FAILURE (ARF) (HCC): Status: ACTIVE | Noted: 2023-04-23

## 2023-04-23 PROBLEM — N17.9 ACUTE RENAL FAILURE SUPERIMPOSED ON CHRONIC KIDNEY DISEASE, UNSPECIFIED CKD STAGE, UNSPECIFIED ACUTE RENAL FAILURE TYPE (HCC): Status: ACTIVE | Noted: 2023-04-23

## 2023-04-23 PROBLEM — I95.9 HYPOTENSION, UNSPECIFIED HYPOTENSION TYPE: Status: ACTIVE | Noted: 2023-04-23

## 2023-04-23 PROBLEM — N17.9 ACUTE RENAL FAILURE (ARF): Status: ACTIVE | Noted: 2023-04-23

## 2023-04-23 PROBLEM — N17.9 ACUTE KIDNEY INJURY (HCC): Status: ACTIVE | Noted: 2023-04-23

## 2023-04-23 PROBLEM — N17.9 ACUTE KIDNEY INJURY: Status: ACTIVE | Noted: 2023-04-23

## 2023-04-23 PROBLEM — N17.9 ACUTE RENAL FAILURE SUPERIMPOSED ON CHRONIC KIDNEY DISEASE, UNSPECIFIED CKD STAGE, UNSPECIFIED ACUTE RENAL FAILURE TYPE: Status: ACTIVE | Noted: 2023-04-23

## 2023-04-23 PROBLEM — N18.9 ACUTE RENAL FAILURE SUPERIMPOSED ON CHRONIC KIDNEY DISEASE, UNSPECIFIED CKD STAGE, UNSPECIFIED ACUTE RENAL FAILURE TYPE: Status: ACTIVE | Noted: 2023-04-23

## 2023-04-23 LAB
ANION GAP SERPL CALC-SCNC: 2 MMOL/L (ref 0–18)
BASOPHILS # BLD AUTO: 0.02 X10(3) UL (ref 0–0.2)
BASOPHILS NFR BLD AUTO: 0.3 %
BILIRUB UR QL STRIP.AUTO: NEGATIVE
BUN BLD-MCNC: 24 MG/DL (ref 7–18)
CALCIUM BLD-MCNC: 8.8 MG/DL (ref 8.5–10.1)
CHLORIDE SERPL-SCNC: 112 MMOL/L (ref 98–112)
CO2 SERPL-SCNC: 27 MMOL/L (ref 21–32)
COLOR UR AUTO: YELLOW
CREAT BLD-MCNC: 1.3 MG/DL
EOSINOPHIL # BLD AUTO: 0.24 X10(3) UL (ref 0–0.7)
EOSINOPHIL NFR BLD AUTO: 3.9 %
ERYTHROCYTE [DISTWIDTH] IN BLOOD BY AUTOMATED COUNT: 13.6 %
GFR SERPLBLD BASED ON 1.73 SQ M-ARVRAT: 65 ML/MIN/1.73M2 (ref 60–?)
GLUCOSE BLD-MCNC: 97 MG/DL (ref 70–99)
GLUCOSE UR STRIP.AUTO-MCNC: NEGATIVE MG/DL
HCT VFR BLD AUTO: 30.8 %
HGB BLD-MCNC: 9.5 G/DL
HYALINE CASTS #/AREA URNS AUTO: PRESENT /LPF
IMM GRANULOCYTES # BLD AUTO: 0.03 X10(3) UL (ref 0–1)
IMM GRANULOCYTES NFR BLD: 0.5 %
KETONES UR STRIP.AUTO-MCNC: NEGATIVE MG/DL
LYMPHOCYTES # BLD AUTO: 1.08 X10(3) UL (ref 1–4)
LYMPHOCYTES NFR BLD AUTO: 17.6 %
MCH RBC QN AUTO: 28.1 PG (ref 26–34)
MCHC RBC AUTO-ENTMCNC: 30.8 G/DL (ref 31–37)
MCV RBC AUTO: 91.1 FL
MONOCYTES # BLD AUTO: 0.65 X10(3) UL (ref 0.1–1)
MONOCYTES NFR BLD AUTO: 10.6 %
NEUTROPHILS # BLD AUTO: 4.1 X10 (3) UL (ref 1.5–7.7)
NEUTROPHILS # BLD AUTO: 4.1 X10(3) UL (ref 1.5–7.7)
NEUTROPHILS NFR BLD AUTO: 67.1 %
NITRITE UR QL STRIP.AUTO: NEGATIVE
OSMOLALITY SERPL CALC.SUM OF ELEC: 296 MOSM/KG (ref 275–295)
PH UR STRIP.AUTO: 5 [PH] (ref 5–8)
PLATELET # BLD AUTO: 365 10(3)UL (ref 150–450)
POTASSIUM SERPL-SCNC: 4 MMOL/L (ref 3.5–5.1)
PROCALCITONIN SERPL-MCNC: <0.05 NG/ML (ref ?–0.16)
PROT UR STRIP.AUTO-MCNC: 100 MG/DL
RBC # BLD AUTO: 3.38 X10(6)UL
RBC #/AREA URNS AUTO: >10 /HPF
SODIUM SERPL-SCNC: 141 MMOL/L (ref 136–145)
SP GR UR STRIP.AUTO: 1.02 (ref 1–1.03)
UROBILINOGEN UR STRIP.AUTO-MCNC: <2 MG/DL
WBC # BLD AUTO: 6.1 X10(3) UL (ref 4–11)
WBC #/AREA URNS AUTO: >50 /HPF

## 2023-04-23 PROCEDURE — 73030 X-RAY EXAM OF SHOULDER: CPT | Performed by: EMERGENCY MEDICINE

## 2023-04-23 PROCEDURE — 74177 CT ABD & PELVIS W/CONTRAST: CPT | Performed by: EMERGENCY MEDICINE

## 2023-04-23 RX ORDER — LISINOPRIL 20 MG/1
20 TABLET ORAL DAILY
Status: DISCONTINUED | OUTPATIENT
Start: 2023-04-23 | End: 2023-04-26

## 2023-04-23 RX ORDER — SODIUM BICARBONATE 325 MG/1
325 TABLET ORAL AS NEEDED
Status: DISCONTINUED | OUTPATIENT
Start: 2023-04-23 | End: 2023-04-26

## 2023-04-23 RX ORDER — SODIUM CHLORIDE 9 MG/ML
INJECTION, SOLUTION INTRAVENOUS CONTINUOUS
Status: ACTIVE | OUTPATIENT
Start: 2023-04-23 | End: 2023-04-23

## 2023-04-23 RX ORDER — AMLODIPINE BESYLATE 5 MG/1
10 TABLET ORAL DAILY
Status: DISCONTINUED | OUTPATIENT
Start: 2023-04-23 | End: 2023-04-26

## 2023-04-23 RX ORDER — FERROUS SULFATE 300 MG/5ML
300 LIQUID (ML) ORAL 3 TIMES DAILY
Status: DISCONTINUED | OUTPATIENT
Start: 2023-04-23 | End: 2023-04-26

## 2023-04-23 RX ORDER — TERAZOSIN 10 MG/1
10 CAPSULE ORAL NIGHTLY
Status: DISCONTINUED | OUTPATIENT
Start: 2023-04-23 | End: 2023-04-26

## 2023-04-23 RX ORDER — BISACODYL 10 MG
10 SUPPOSITORY, RECTAL RECTAL
Status: DISCONTINUED | OUTPATIENT
Start: 2023-04-23 | End: 2023-04-26

## 2023-04-23 RX ORDER — HYDRALAZINE HYDROCHLORIDE 50 MG/1
100 TABLET, FILM COATED ORAL 3 TIMES DAILY
Status: DISCONTINUED | OUTPATIENT
Start: 2023-04-23 | End: 2023-04-26

## 2023-04-23 RX ORDER — SODIUM PHOSPHATE, DIBASIC AND SODIUM PHOSPHATE, MONOBASIC 7; 19 G/133ML; G/133ML
1 ENEMA RECTAL ONCE AS NEEDED
Status: DISCONTINUED | OUTPATIENT
Start: 2023-04-23 | End: 2023-04-26

## 2023-04-23 RX ORDER — ACETAMINOPHEN 325 MG/1
650 TABLET ORAL EVERY 6 HOURS PRN
Status: DISCONTINUED | OUTPATIENT
Start: 2023-04-23 | End: 2023-04-26

## 2023-04-23 RX ORDER — ATORVASTATIN CALCIUM 40 MG/1
40 TABLET, FILM COATED ORAL NIGHTLY
Status: DISCONTINUED | OUTPATIENT
Start: 2023-04-23 | End: 2023-04-26

## 2023-04-23 RX ORDER — SENNOSIDES 8.8 MG/5ML
17.6 LIQUID ORAL NIGHTLY PRN
Status: DISCONTINUED | OUTPATIENT
Start: 2023-04-23 | End: 2023-04-26

## 2023-04-23 RX ORDER — POLYETHYLENE GLYCOL 3350 17 G/17G
17 POWDER, FOR SOLUTION ORAL DAILY PRN
Status: DISCONTINUED | OUTPATIENT
Start: 2023-04-23 | End: 2023-04-26

## 2023-04-23 RX ORDER — ATORVASTATIN CALCIUM 40 MG/1
40 TABLET, FILM COATED ORAL NIGHTLY
COMMUNITY

## 2023-04-23 RX ORDER — CARVEDILOL 12.5 MG/1
25 TABLET ORAL 2 TIMES DAILY WITH MEALS
Status: DISCONTINUED | OUTPATIENT
Start: 2023-04-23 | End: 2023-04-26

## 2023-04-23 RX ORDER — ONDANSETRON 4 MG/1
4 TABLET, FILM COATED ORAL EVERY 8 HOURS PRN
Status: DISCONTINUED | OUTPATIENT
Start: 2023-04-23 | End: 2023-04-26

## 2023-04-23 NOTE — PLAN OF CARE
Per Radha/dietician she reached out to the nursing facility & found out that patient is doing both p/o feeds trying to have 3 meals a day & also he has nocturnal feeds,on at 7pm off at 5am for a total of 10H feeding time. Patient apparently would refuse tube feeds at night when he is full from the p.o intake that he has during the day time. Diet order placed in the chart as well as tube feeding orders per protocol,informed Dr Teresa Snowden.

## 2023-04-23 NOTE — ED INITIAL ASSESSMENT (HPI)
53[facility] c/c of fever EMS reports that they where call for a pt with fever and hypotension Pt had a previous CVA with R sided deficits

## 2023-04-23 NOTE — PLAN OF CARE
NURSING ADMISSION NOTE    Patient admitted via Cart  Oriented to room. Safety precautions initiated. Bed in low position. Call light in reach. Pt received from ER. Pt a/o x1. Answers yes/no questions w/ head nods/gestures. Hx of CVA w/ aphasia & R side paralysis/weakness. Glasses on. VSS on RA. No c/o pain at the moment. Pt arrived w/ jonathan. Per notes, jonathan exchanged @ ER. Gtube intact & clamped. SLP & dietician added to eval. Per Cate paperwork pt on nocturnal feedings + oral intake. Fall risk protocol followed, call light within reach. [FreeTextEntry1] : swollen right toe

## 2023-04-23 NOTE — PROGRESS NOTES
[facility] Pharmacy Note:  Renal Adjustment for meropenem (MERREM)    Panda Cook is a 52year old patient who has been prescribed meropenem (MERREM) 1 gm every 12 hrs. The estimated creatinine clearance is 46.8 mL/min (A) (based on SCr of 1.9 mg/dL (H)). The dose has been adjusted to meropenem (MERREM) 500 mg every 8 hrs per hospital renal dose adjustment protocol for treatment of UTI. Pharmacy will follow and adjust dose as warranted for additional renal function changes.     Thank you,    Kenn Crowder, PharmD  4/23/2023  6:24 AM

## 2023-04-23 NOTE — PLAN OF CARE
Resting in bed. Awake & alert, able to make needs known. Aphasic,having difficulty expressing needs & short of responses & prolonged pauses. Meds given thru the G tube. Speech to evaluate patient today. Castillo catheter in place draining to large urine output. Has existing wound sorrounding the castillo insertion site. Urology following. Will discuss if suprapubic is an option. RN had to clarify when last time castillo was changed in the facility as requested by urology. RN spoke to regular nurse who told writer he changed the catheter April 10th & patient has out patient urology appointment. He asked if suprapubic would be an option for patient because of the wound. Informed rounding PA re this. Awaiting plan & recommendation. RN was told urology will follow, potential suprapubic when infection is resolved. Dietician recommendation for tube feeding in place carried over same feeding & regimen at the facility which is nocturnal feeding. Patient's fiance called & has mentioned that patient has been fed all meals at the facility & hasn't been on tube feeds for 2 weeks now. Per nursing facility records,patient is on pleasure feeds p.o & is on tube feedings staring 7pm to am for a set number of hours every night. Dietician made aware of this. She will reach out to nursing facility & revise recommendation when needed. All needs attended. Call light placed in reach.        Problem: Impaired Swallowing  Goal: Minimize aspiration risk  Description: Interventions:  - Patient should be alert and upright for all feedings (90 degrees preferred)  - Offer food and liquids at a slow rate  - No straws  - Encourage small bites of food and small sips of liquid  - Offer pills one at a time, crush or deliver with applesauce as needed  - Discontinue feeding and notify MD (or speech pathologist) if coughing or persistent throat clearing or wet/gurgly vocal quality is noted  Outcome: Progressing     Problem: RISK FOR INFECTION - ADULT  Goal: Absence of fever/infection during anticipated neutropenic period  Description: INTERVENTIONS  - Monitor WBC  - Administer growth factors as ordered  - Implement neutropenic guidelines  Outcome: Progressing

## 2023-04-23 NOTE — PLAN OF CARE
Urolory PA rounded on patient & was inquiring as to when was the last time the castillo catheter was changed at the nursing facility where patient came from. RN called [facility] & spoke to regular RN who claimed that the castillo was changed on April 10. The said nurse also mentioned about the woound that is located in the castillo insertion site. He had asked if urology will consider placing a suprapubic catheter due to the wound. will contact urology re this.

## 2023-04-23 NOTE — ED QUICK NOTES
Orders for admission, patient is aware of plan and ready to go upstairs.  Any questions, please call ED RN Keith Guzman  at extension [phone]    Vaccinatedtes  Type of COVID test sent:  COVID Suspicion level: Low      Titratable drug(s) infusing:    Rate:    LOC at time of transport: alert follow commands   Other pertinent information: bed bound R arm deficits, aphasia, indwelling castillo prior to arrival that was change in the ER, gtube     CIWA score= na  NIH score= na

## 2023-04-24 LAB
ANION GAP SERPL CALC-SCNC: 4 MMOL/L (ref 0–18)
ATRIAL RATE: 58 BPM
BUN BLD-MCNC: 22 MG/DL (ref 7–18)
CALCIUM BLD-MCNC: 8.9 MG/DL (ref 8.5–10.1)
CHLORIDE SERPL-SCNC: 112 MMOL/L (ref 98–112)
CO2 SERPL-SCNC: 26 MMOL/L (ref 21–32)
CREAT BLD-MCNC: 1 MG/DL
GFR SERPLBLD BASED ON 1.73 SQ M-ARVRAT: 89 ML/MIN/1.73M2 (ref 60–?)
GLUCOSE BLD-MCNC: 108 MG/DL (ref 70–99)
GLUCOSE BLD-MCNC: 115 MG/DL (ref 70–99)
GLUCOSE BLD-MCNC: 130 MG/DL (ref 70–99)
GLUCOSE BLD-MCNC: 131 MG/DL (ref 70–99)
GLUCOSE BLD-MCNC: 184 MG/DL (ref 70–99)
OSMOLALITY SERPL CALC.SUM OF ELEC: 298 MOSM/KG (ref 275–295)
P AXIS: 46 DEGREES
P-R INTERVAL: 176 MS
POTASSIUM SERPL-SCNC: 3.9 MMOL/L (ref 3.5–5.1)
Q-T INTERVAL: 398 MS
QRS DURATION: 78 MS
QTC CALCULATION (BEZET): 390 MS
R AXIS: 67 DEGREES
SODIUM SERPL-SCNC: 142 MMOL/L (ref 136–145)
T AXIS: -23 DEGREES
VENTRICULAR RATE: 58 BPM

## 2023-04-24 PROCEDURE — 90792 PSYCH DIAG EVAL W/MED SRVCS: CPT

## 2023-04-24 RX ORDER — BUPROPION HYDROCHLORIDE 150 MG/1
150 TABLET ORAL DAILY
Status: DISCONTINUED | OUTPATIENT
Start: 2023-04-25 | End: 2023-04-26

## 2023-04-24 RX ORDER — ERTAPENEM 1 G/1
1 INJECTION, POWDER, LYOPHILIZED, FOR SOLUTION INTRAMUSCULAR; INTRAVENOUS DAILY
Qty: 10 EACH | Refills: 0 | Status: SHIPPED | OUTPATIENT
Start: 2023-04-24 | End: 2023-04-24

## 2023-04-24 NOTE — PLAN OF CARE
Pt a/o x1. VSS on RA. No c/o pain at the moment. Meds per MAR. TF per order. Marita patent & draining. Pt declining some care, becomes slightly irritated. Fall risk protocol followed, call light within reach.

## 2023-04-24 NOTE — PLAN OF CARE
Fed 1:1 by staff during dinner. Tolerated pureed diet w/ thin liquids. Tube feeding to start at 1900 to run for for 10H off at 5am. G tube intact & patent flushed w/ H20 as needed.

## 2023-04-24 NOTE — CM/SW NOTE
Orders received for IV antibiotics. Pt is LTC resident at [facility]. Updated facility that pt will need IV merrem after DC. Prescriptions sent via [facility]. Midline ordered today. Will update facility with line information once available. CM/SW will remain available for DC planning/ support.      Zachary Chen, BSN, VIA [facility]    [id number]

## 2023-04-24 NOTE — CONSULTS
Formerly [facility]    PATIENT'S NAME: [email or web address], [address]   ATTENDING PHYSICIAN: Iveth Milian MD   CONSULTING PHYSICIAN: Tl Keating. Nabeel Gilliland M.D. PATIENT ACCOUNT#:   [de-identified]    LOCATION:  [address] [place] [address]  MEDICAL RECORD #:   [id number]       YOB: 1970  ADMISSION DATE:       04/22/2023      CONSULT DATE:  04/23/2023    REPORT OF CONSULTATION    HISTORY OF PRESENT ILLNESS:  This is a 24-year-old man who has a past history significant for stroke. He has a G-tube and a chronic Kirkland. He had an MDRO UTI and attempt with treatment with Cipro was given, but he presented here with fever and hypotension and urine with active sediment. He has been started on meropenem, and I am asked to evaluate. Currently, he is awake, seems alert, and can verbalize a few words but seems to understand things well. He denies being in any pain. No other GI, cardiovascular, new CNS or respiratory symptoms are noted. PAST MEDICAL HISTORY:  Significant for the above, congenital anomaly of the heart, GERD, hypertension. Bilateral retrograde pyelograms were done in December 2022. MEDICATIONS:  Meropenem. Other medications reviewed in Epic. ALLERGIES:  Penicillin. SOCIAL HISTORY:  Negative cigarettes and alcohol. FAMILY HISTORY:  Father had cancer. REVIEW OF SYSTEMS:  Weight has been stable. PHYSICAL EXAMINATION:    GENERAL:  This is a well-developed male, up in bed, in no acute distress. VITAL SIGNS:  Currently, he is afebrile. Other vitals are stable. HEENT:  Normal.  NECK:  Supple. No JVD or adenopathy. LUNGS:  Clear. HEART:  I do not hear a murmur. BACK:  Left CVA discomfort. ABDOMEN:  Nontender. No masses, rebound, or organomegaly. EXTREMITIES:  Disuse atrophy. No clubbing, cyanosis, edema, phlebitis, or cellulitis. NEUROLOGIC:  Not further tested by me. LABORATORY DATA:  BUN and creatinine 24 and 1.3.   White count 6.1, hemoglobin 9.5, platelets 908, polys 67, lymphs 18, monos 10. Urinalysis: Active sediment is noted; no yeast seen. Two blood cultures are negative. The urine culture is in the lab. Previous white count was 6.6. Previous BUN and creatinine 29 and 1.9. ALT 24, bilirubin 0.2. CT scan:  Please see the report. Enhancement of the left ureter, cystitis with an ascending infection. There is a right kidney stone nonobstructive. There is a chest x-ray, atelectasis. There is a shoulder x-ray, calcification lateral to the humeral head. IMPRESSION:  Sepsis from a Kirkland catheter urinary tract infection. Clinically, there is left pyelonephritis. This seems to be confirmed with some ascending process seen on the CT scan. Continue meropenem pending cultures. I would suggest changing the Kirkland if it has not yet been done. Will also order a PSA. Length of IV therapy to be determined. Further suggestions to follow. Thank you very much for allowing me to see this patient. Dictated By Khadijah Goff.  Pedro Jang M.D.  d: 04/23/2023 18:02:25  t: 04/23/2023 18:18:08  Job [id number]  [facility]/

## 2023-04-25 LAB
ALBUMIN SERPL-MCNC: 2.7 G/DL (ref 3.4–5)
ANION GAP SERPL CALC-SCNC: 4 MMOL/L (ref 0–18)
BUN BLD-MCNC: 20 MG/DL (ref 7–18)
CALCIUM BLD-MCNC: 9.3 MG/DL (ref 8.5–10.1)
CHLORIDE SERPL-SCNC: 110 MMOL/L (ref 98–112)
CO2 SERPL-SCNC: 25 MMOL/L (ref 21–32)
CREAT BLD-MCNC: 0.82 MG/DL
GFR SERPLBLD BASED ON 1.73 SQ M-ARVRAT: 104 ML/MIN/1.73M2 (ref 60–?)
GLUCOSE BLD-MCNC: 101 MG/DL (ref 70–99)
GLUCOSE BLD-MCNC: 106 MG/DL (ref 70–99)
GLUCOSE BLD-MCNC: 123 MG/DL (ref 70–99)
GLUCOSE BLD-MCNC: 138 MG/DL (ref 70–99)
GLUCOSE BLD-MCNC: 147 MG/DL (ref 70–99)
MAGNESIUM SERPL-MCNC: 1.8 MG/DL (ref 1.6–2.6)
OSMOLALITY SERPL CALC.SUM OF ELEC: 291 MOSM/KG (ref 275–295)
PHOSPHATE SERPL-MCNC: 2.8 MG/DL (ref 2.5–4.9)
POTASSIUM SERPL-SCNC: 4.2 MMOL/L (ref 3.5–5.1)
SODIUM SERPL-SCNC: 139 MMOL/L (ref 136–145)

## 2023-04-25 PROCEDURE — 99232 SBSQ HOSP IP/OBS MODERATE 35: CPT

## 2023-04-25 PROCEDURE — 05HC33Z INSERTION OF INFUSION DEVICE INTO LEFT BASILIC VEIN, PERCUTANEOUS APPROACH: ICD-10-PCS | Performed by: INTERNAL MEDICINE

## 2023-04-25 NOTE — PROGRESS NOTES
Patient alert x2, some confusion noted. No complaints of pain. Tube feedings hung overnight. Tolerated well. meds given per MAR. Kirkland catheter intact and draining. Bed alarm on. Safety precautions in place.

## 2023-04-25 NOTE — PROGRESS NOTES
Received patient alert and oriented to self. No complaints of pain. Medications given per MAR. Contact precautions maintained. Kirkland intact and draining. Safety precautions in place. Call light within reach.

## 2023-04-25 NOTE — CM/SW NOTE
Clinical updates, including pt's Midline information sent to [facility]. Plans for DC w/ IV Merrem. CM/SW will remain available for DC planning/ support.     CALLUM Patrick, VIA [facility]    [id number]

## 2023-04-26 VITALS
TEMPERATURE: 98 F | HEART RATE: 79 BPM | OXYGEN SATURATION: 96 % | DIASTOLIC BLOOD PRESSURE: 72 MMHG | BODY MASS INDEX: 31 KG/M2 | SYSTOLIC BLOOD PRESSURE: 152 MMHG | RESPIRATION RATE: 18 BRPM | WEIGHT: 220.69 LBS

## 2023-04-26 LAB
ALBUMIN SERPL-MCNC: 2.5 G/DL (ref 3.4–5)
ANION GAP SERPL CALC-SCNC: 6 MMOL/L (ref 0–18)
BUN BLD-MCNC: 20 MG/DL (ref 7–18)
CALCIUM BLD-MCNC: 9.1 MG/DL (ref 8.5–10.1)
CHLORIDE SERPL-SCNC: 111 MMOL/L (ref 98–112)
CO2 SERPL-SCNC: 25 MMOL/L (ref 21–32)
CREAT BLD-MCNC: 0.77 MG/DL
GFR SERPLBLD BASED ON 1.73 SQ M-ARVRAT: 106 ML/MIN/1.73M2 (ref 60–?)
GLUCOSE BLD-MCNC: 100 MG/DL (ref 70–99)
GLUCOSE BLD-MCNC: 113 MG/DL (ref 70–99)
GLUCOSE BLD-MCNC: 127 MG/DL (ref 70–99)
GLUCOSE BLD-MCNC: 144 MG/DL (ref 70–99)
OSMOLALITY SERPL CALC.SUM OF ELEC: 298 MOSM/KG (ref 275–295)
PHOSPHATE SERPL-MCNC: 2.5 MG/DL (ref 2.5–4.9)
POTASSIUM SERPL-SCNC: 3.8 MMOL/L (ref 3.5–5.1)
SODIUM SERPL-SCNC: 142 MMOL/L (ref 136–145)

## 2023-04-26 PROCEDURE — 99232 SBSQ HOSP IP/OBS MODERATE 35: CPT

## 2023-04-26 RX ORDER — BUPROPION HYDROCHLORIDE 150 MG/1
150 TABLET ORAL DAILY
Qty: 30 TABLET | Refills: 1 | Status: SHIPPED | OUTPATIENT
Start: 2023-04-27

## 2023-04-26 RX ORDER — MAGNESIUM SULFATE HEPTAHYDRATE 40 MG/ML
2 INJECTION, SOLUTION INTRAVENOUS ONCE
Status: DISCONTINUED | OUTPATIENT
Start: 2023-04-26 | End: 2023-04-26

## 2023-04-26 NOTE — PLAN OF CARE
NURSING DISCHARGE NOTE    Discharged Nursing home via Ambulance. Accompanied by Support staff  Belongings Taken by patient/family. Report given to transferring RN at [facility]. VSS, remained afebrile. Meds given per MAR. No complaints of pain. 1:1 feeding, tolerated diet.      Problem: Impaired Swallowing  Goal: Minimize aspiration risk  Description: Interventions:  - Patient should be alert and upright for all feedings (90 degrees preferred)  - Offer food and liquids at a slow rate  - No straws  - Encourage small bites of food and small sips of liquid  - Offer pills one at a time, crush or deliver with applesauce as needed  - Discontinue feeding and notify MD (or speech pathologist) if coughing or persistent throat clearing or wet/gurgly vocal quality is noted  Outcome: Adequate for Discharge

## 2023-04-26 NOTE — BH PROGRESS NOTE
Referrals placed in the discharge summary for the patient to follow up with a Psychiatrist per Mateo SANCHES.

## 2023-04-26 NOTE — PROGRESS NOTES
Received patient alert x1-2. No complaints of pain. Medications given per MAR. Kirkland intact and draining. Safety precautions in place. Call light within reach.

## 2023-04-26 NOTE — PLAN OF CARE
Alert to self, place. Room air. Afebrile. Aspiration precaution maintained. G- tube with TF at night, tolerated well. Denies pain. Maximum assist in ADLs. Repositioned for comfort. Castillo draining yellow urine, castillo care done. Had BM overnight.    Problem: Impaired Swallowing  Goal: Minimize aspiration risk  Description: Interventions:  - Patient should be alert and upright for all feedings (90 degrees preferred)  - Offer food and liquids at a slow rate  - No straws  - Encourage small bites of food and small sips of liquid  - Offer pills one at a time, crush or deliver with applesauce as needed  - Discontinue feeding and notify MD (or speech pathologist) if coughing or persistent throat clearing or wet/gurgly vocal quality is noted  Outcome: Progressing

## 2023-04-26 NOTE — PROGRESS NOTES
[facility] Urology    Spoke with hospitalist - patient's brother Ellsworth Baumgarten is interested in arranging SPT placement. Left message for Ellsworth Baumgarten to call back. Gautam De Luna P.A.-C  Urology    Addendum:  Spoke with patient's brother Ellsworth Baumgarten - brother and patient would like to proceed with SPT placement - surgery ticket sent. Ellsworth Baumgarten was informed that he  will need medical to include being off blood thinner for surgery. Will need pre-op urine culture/abx as well. Ellsworth Baumgarten agrees and understands. Gerardo/patient to meet with urology surgeon pre-operatively.     Gautam De Luna P.A.-C  Urology

## 2023-04-26 NOTE — CM/SW NOTE
04/26/23 1421   Discharge disposition   Expected discharge disposition Skilled Nurs   [facility] Provider [facility]   Discharge transportation [facility]   Pt medically cleared for return to [facility]. [place] ambulance arranged for 5pm. RN to call report to [phone]. Sw updated pt's brother Tamar Galicia.     Steve Payne, EMILYW  /Discharge Planner

## 2023-05-13 ENCOUNTER — APPOINTMENT (OUTPATIENT)
Dept: GENERAL RADIOLOGY | Facility: HOSPITAL | Age: 53
End: 2023-05-13
Attending: EMERGENCY MEDICINE
Payer: MEDICAID

## 2023-05-13 ENCOUNTER — HOSPITAL ENCOUNTER (EMERGENCY)
Facility: HOSPITAL | Age: 53
Discharge: HOME | End: 2023-05-13
Attending: EMERGENCY MEDICINE
Payer: MEDICAID

## 2023-05-13 ENCOUNTER — APPOINTMENT (OUTPATIENT)
Dept: CT IMAGING | Facility: HOSPITAL | Age: 53
End: 2023-05-13
Attending: EMERGENCY MEDICINE
Payer: MEDICAID

## 2023-05-13 VITALS
WEIGHT: 210 LBS | RESPIRATION RATE: 18 BRPM | OXYGEN SATURATION: 99 % | HEART RATE: 70 BPM | BODY MASS INDEX: 30.06 KG/M2 | HEIGHT: 70 IN | TEMPERATURE: 99 F | DIASTOLIC BLOOD PRESSURE: 78 MMHG | SYSTOLIC BLOOD PRESSURE: 150 MMHG

## 2023-05-13 DIAGNOSIS — N12 PYELONEPHRITIS: ICD-10-CM

## 2023-05-13 DIAGNOSIS — R10.9 ABDOMINAL PAIN, ACUTE: Primary | ICD-10-CM

## 2023-05-13 DIAGNOSIS — T83.091A MECHANICAL COMPLICATION OF URETHRAL CATHETER, INITIAL ENCOUNTER (HCC): ICD-10-CM

## 2023-05-13 LAB
ALBUMIN SERPL-MCNC: 3.1 G/DL (ref 3.4–5)
ALBUMIN/GLOB SERPL: 0.7 {RATIO} (ref 1–2)
ALP LIVER SERPL-CCNC: 123 U/L
ALT SERPL-CCNC: 33 U/L
ANION GAP SERPL CALC-SCNC: 3 MMOL/L (ref 0–18)
AST SERPL-CCNC: 27 U/L (ref 15–37)
ATRIAL RATE: 80 BPM
BASOPHILS # BLD AUTO: 0.03 X10(3) UL (ref 0–0.2)
BASOPHILS NFR BLD AUTO: 0.4 %
BILIRUB SERPL-MCNC: 0.2 MG/DL (ref 0.1–2)
BILIRUB UR QL STRIP.AUTO: NEGATIVE
BUN BLD-MCNC: 22 MG/DL (ref 7–18)
CALCIUM BLD-MCNC: 9.5 MG/DL (ref 8.5–10.1)
CHLORIDE SERPL-SCNC: 105 MMOL/L (ref 98–112)
CO2 SERPL-SCNC: 26 MMOL/L (ref 21–32)
COLOR UR AUTO: YELLOW
CREAT BLD-MCNC: 1.03 MG/DL
EOSINOPHIL # BLD AUTO: 0.36 X10(3) UL (ref 0–0.7)
EOSINOPHIL NFR BLD AUTO: 4.7 %
ERYTHROCYTE [DISTWIDTH] IN BLOOD BY AUTOMATED COUNT: 14 %
GFR SERPLBLD BASED ON 1.73 SQ M-ARVRAT: 86 ML/MIN/1.73M2 (ref 60–?)
GLOBULIN PLAS-MCNC: 4.4 G/DL (ref 2.8–4.4)
GLUCOSE BLD-MCNC: 114 MG/DL (ref 70–99)
GLUCOSE UR STRIP.AUTO-MCNC: NEGATIVE MG/DL
HCT VFR BLD AUTO: 34.4 %
HGB BLD-MCNC: 11.5 G/DL
IMM GRANULOCYTES # BLD AUTO: 0.03 X10(3) UL (ref 0–1)
IMM GRANULOCYTES NFR BLD: 0.4 %
KETONES UR STRIP.AUTO-MCNC: NEGATIVE MG/DL
LYMPHOCYTES # BLD AUTO: 0.93 X10(3) UL (ref 1–4)
LYMPHOCYTES NFR BLD AUTO: 12.2 %
MCH RBC QN AUTO: 29.7 PG (ref 26–34)
MCHC RBC AUTO-ENTMCNC: 33.4 G/DL (ref 31–37)
MCV RBC AUTO: 88.9 FL
MONOCYTES # BLD AUTO: 0.71 X10(3) UL (ref 0.1–1)
MONOCYTES NFR BLD AUTO: 9.3 %
NEUTROPHILS # BLD AUTO: 5.57 X10 (3) UL (ref 1.5–7.7)
NEUTROPHILS # BLD AUTO: 5.57 X10(3) UL (ref 1.5–7.7)
NEUTROPHILS NFR BLD AUTO: 73 %
NITRITE UR QL STRIP.AUTO: NEGATIVE
OSMOLALITY SERPL CALC.SUM OF ELEC: 282 MOSM/KG (ref 275–295)
P AXIS: 56 DEGREES
P-R INTERVAL: 176 MS
PH UR STRIP.AUTO: 9 [PH] (ref 5–8)
PLATELET # BLD AUTO: 324 10(3)UL (ref 150–450)
POTASSIUM SERPL-SCNC: 4.2 MMOL/L (ref 3.5–5.1)
PROT SERPL-MCNC: 7.5 G/DL (ref 6.4–8.2)
PROT UR STRIP.AUTO-MCNC: 100 MG/DL
Q-T INTERVAL: 354 MS
QRS DURATION: 94 MS
QTC CALCULATION (BEZET): 408 MS
R AXIS: 5 DEGREES
RBC # BLD AUTO: 3.87 X10(6)UL
RBC #/AREA URNS AUTO: >10 /HPF
SARS-COV-2 RNA RESP QL NAA+PROBE: NOT DETECTED
SODIUM SERPL-SCNC: 134 MMOL/L (ref 136–145)
SP GR UR STRIP.AUTO: 1.01 (ref 1–1.03)
T AXIS: 6 DEGREES
TROPONIN I HIGH SENSITIVITY: 7 NG/L
UROBILINOGEN UR STRIP.AUTO-MCNC: <2 MG/DL
VENTRICULAR RATE: 80 BPM
WBC # BLD AUTO: 7.6 X10(3) UL (ref 4–11)
WBC #/AREA URNS AUTO: >50 /HPF
WBC CLUMPS UR QL AUTO: PRESENT /HPF

## 2023-05-13 PROCEDURE — 87077 CULTURE AEROBIC IDENTIFY: CPT | Performed by: EMERGENCY MEDICINE

## 2023-05-13 PROCEDURE — 99285 EMERGENCY DEPT VISIT HI MDM: CPT

## 2023-05-13 PROCEDURE — 51702 INSERT TEMP BLADDER CATH: CPT

## 2023-05-13 PROCEDURE — 87086 URINE CULTURE/COLONY COUNT: CPT | Performed by: EMERGENCY MEDICINE

## 2023-05-13 PROCEDURE — 71045 X-RAY EXAM CHEST 1 VIEW: CPT | Performed by: EMERGENCY MEDICINE

## 2023-05-13 PROCEDURE — 96374 THER/PROPH/DIAG INJ IV PUSH: CPT

## 2023-05-13 PROCEDURE — 84484 ASSAY OF TROPONIN QUANT: CPT | Performed by: EMERGENCY MEDICINE

## 2023-05-13 PROCEDURE — 93005 ELECTROCARDIOGRAM TRACING: CPT

## 2023-05-13 PROCEDURE — 93010 ELECTROCARDIOGRAM REPORT: CPT

## 2023-05-13 PROCEDURE — 85025 COMPLETE CBC W/AUTO DIFF WBC: CPT | Performed by: EMERGENCY MEDICINE

## 2023-05-13 PROCEDURE — 87186 SC STD MICRODIL/AGAR DIL: CPT | Performed by: EMERGENCY MEDICINE

## 2023-05-13 PROCEDURE — 96361 HYDRATE IV INFUSION ADD-ON: CPT

## 2023-05-13 PROCEDURE — 80053 COMPREHEN METABOLIC PANEL: CPT | Performed by: EMERGENCY MEDICINE

## 2023-05-13 PROCEDURE — 96376 TX/PRO/DX INJ SAME DRUG ADON: CPT

## 2023-05-13 PROCEDURE — 87184 SC STD DISK METHOD PER PLATE: CPT | Performed by: EMERGENCY MEDICINE

## 2023-05-13 PROCEDURE — 74177 CT ABD & PELVIS W/CONTRAST: CPT | Performed by: EMERGENCY MEDICINE

## 2023-05-13 PROCEDURE — 81001 URINALYSIS AUTO W/SCOPE: CPT | Performed by: EMERGENCY MEDICINE

## 2023-05-13 RX ORDER — HYDROCODONE BITARTRATE AND ACETAMINOPHEN 5; 325 MG/1; MG/1
1-2 TABLET ORAL EVERY 6 HOURS PRN
Qty: 10 TABLET | Refills: 0 | Status: SHIPPED | OUTPATIENT
Start: 2023-05-13 | End: 2023-05-19

## 2023-05-13 RX ORDER — SULFAMETHOXAZOLE AND TRIMETHOPRIM 800; 160 MG/1; MG/1
1 TABLET ORAL ONCE
Status: COMPLETED | OUTPATIENT
Start: 2023-05-13 | End: 2023-05-13

## 2023-05-13 RX ORDER — SODIUM CHLORIDE 9 MG/ML
125 INJECTION, SOLUTION INTRAVENOUS CONTINUOUS
Status: DISCONTINUED | OUTPATIENT
Start: 2023-05-13 | End: 2023-05-13

## 2023-05-13 RX ORDER — HYDROMORPHONE HYDROCHLORIDE 1 MG/ML
0.5 INJECTION, SOLUTION INTRAMUSCULAR; INTRAVENOUS; SUBCUTANEOUS EVERY 30 MIN PRN
Status: DISCONTINUED | OUTPATIENT
Start: 2023-05-13 | End: 2023-05-13

## 2023-05-13 RX ORDER — SULFAMETHOXAZOLE AND TRIMETHOPRIM 800; 160 MG/1; MG/1
1 TABLET ORAL 2 TIMES DAILY
Qty: 28 TABLET | Refills: 0 | Status: SHIPPED | OUTPATIENT
Start: 2023-05-13 | End: 2023-05-19

## 2023-05-13 NOTE — DISCHARGE INSTRUCTIONS
Follow-up for further valuation with primary physician and urology. Return if new or worse symptoms. Plenty of fluids. Antibiotics as prescribed. Pain medicines as needed. Kirkland catheter care.

## 2023-05-13 NOTE — ED QUICK NOTES
Pt states he gets SOB when \"he has to pee\". Pt has a chronic castillo catheter in place and Pt is aware of the catheter. Pt states the SOB only comes when this feeling occurs, but the pain lingers.

## 2023-05-13 NOTE — ED INITIAL ASSESSMENT (HPI)
From [facility] in [place] via EMS for c/o SOB. Staff called ambulance after Pt yelled out. Pt on room air at [facility] [place] as well as with EMS and remained in high 90s.

## 2023-05-13 NOTE — ED QUICK NOTES
Rounding Completed    Plan of Care reviewed. Waiting for lab/imaging results. Elimination needs assessed- Pt yelling out to staff to come into room. Pt states he had a BM. Pt changed and cleaned. Pt resting comfortably on cot. Bed is locked and in lowest position. Call light within reach.

## 2023-05-13 NOTE — ED QUICK NOTES
Rounding Completed    Plan of Care reviewed. Waiting for lab/imaging results. Elimination needs assessed- complete brief change after Pt had a BM  Provided Pt with new brief and sheets. Bed is locked and in lowest position. Call light within reach.

## 2023-05-19 RX ORDER — ASPIRIN 81 MG
100 TABLET, DELAYED RELEASE (ENTERIC COATED) ORAL 2 TIMES DAILY
Status: ON HOLD | COMMUNITY

## 2023-05-22 ENCOUNTER — ANESTHESIA EVENT (OUTPATIENT)
Dept: SURGERY | Facility: HOSPITAL | Age: 53
End: 2023-05-22
Payer: MEDICAID

## 2023-05-22 ENCOUNTER — ANESTHESIA (OUTPATIENT)
Dept: SURGERY | Facility: HOSPITAL | Age: 53
End: 2023-05-22
Payer: MEDICAID

## 2023-05-22 ENCOUNTER — HOSPITAL ENCOUNTER (OUTPATIENT)
Facility: HOSPITAL | Age: 53
Setting detail: HOSPITAL OUTPATIENT SURGERY
Discharge: HOME | End: 2023-05-22
Attending: UROLOGY | Admitting: UROLOGY
Payer: MEDICAID

## 2023-05-22 VITALS
WEIGHT: 216 LBS | HEIGHT: 70 IN | TEMPERATURE: 98 F | SYSTOLIC BLOOD PRESSURE: 111 MMHG | HEART RATE: 77 BPM | BODY MASS INDEX: 30.92 KG/M2 | RESPIRATION RATE: 16 BRPM | DIASTOLIC BLOOD PRESSURE: 61 MMHG | OXYGEN SATURATION: 96 %

## 2023-05-22 PROCEDURE — S0077 INJECTION, CLINDAMYCIN PHOSP: HCPCS | Performed by: ANESTHESIOLOGY

## 2023-05-22 PROCEDURE — 0T9B40Z DRAINAGE OF BLADDER WITH DRAINAGE DEVICE, PERCUTANEOUS ENDOSCOPIC APPROACH: ICD-10-PCS | Performed by: UROLOGY

## 2023-05-22 RX ORDER — SULFAMETHOXAZOLE AND TRIMETHOPRIM 800; 160 MG/1; MG/1
1 TABLET ORAL 2 TIMES DAILY
Status: ON HOLD | COMMUNITY

## 2023-05-22 RX ORDER — LISINOPRIL 20 MG/1
20 TABLET ORAL DAILY
Status: ON HOLD | COMMUNITY

## 2023-05-22 RX ORDER — CEFAZOLIN SODIUM/WATER 2 G/20 ML
2 SYRINGE (ML) INTRAVENOUS ONCE
Status: DISCONTINUED | OUTPATIENT
Start: 2023-05-22 | End: 2023-05-22 | Stop reason: HOSPADM

## 2023-05-22 RX ORDER — CLINDAMYCIN PHOSPHATE 150 MG/ML
INJECTION, SOLUTION INTRAVENOUS AS NEEDED
Status: DISCONTINUED | OUTPATIENT
Start: 2023-05-22 | End: 2023-05-22 | Stop reason: SURG

## 2023-05-22 RX ORDER — HYDROMORPHONE HYDROCHLORIDE 1 MG/ML
0.6 INJECTION, SOLUTION INTRAMUSCULAR; INTRAVENOUS; SUBCUTANEOUS EVERY 5 MIN PRN
Status: DISCONTINUED | OUTPATIENT
Start: 2023-05-22 | End: 2023-05-22

## 2023-05-22 RX ORDER — ONDANSETRON 2 MG/ML
INJECTION INTRAMUSCULAR; INTRAVENOUS AS NEEDED
Status: DISCONTINUED | OUTPATIENT
Start: 2023-05-22 | End: 2023-05-22 | Stop reason: SURG

## 2023-05-22 RX ORDER — SODIUM CHLORIDE, SODIUM LACTATE, POTASSIUM CHLORIDE, CALCIUM CHLORIDE 600; 310; 30; 20 MG/100ML; MG/100ML; MG/100ML; MG/100ML
INJECTION, SOLUTION INTRAVENOUS CONTINUOUS
Status: DISCONTINUED | OUTPATIENT
Start: 2023-05-22 | End: 2023-05-22

## 2023-05-22 RX ORDER — FAMOTIDINE 20 MG/1
20 TABLET, FILM COATED ORAL ONCE
Status: DISCONTINUED | OUTPATIENT
Start: 2023-05-22 | End: 2023-05-22 | Stop reason: HOSPADM

## 2023-05-22 RX ORDER — PHENYLEPHRINE HCL 10 MG/ML
VIAL (ML) INJECTION AS NEEDED
Status: DISCONTINUED | OUTPATIENT
Start: 2023-05-22 | End: 2023-05-22 | Stop reason: SURG

## 2023-05-22 RX ORDER — CLINDAMYCIN PHOSPHATE 900 MG/50ML
900 INJECTION INTRAVENOUS ONCE
Status: DISCONTINUED | OUTPATIENT
Start: 2023-05-22 | End: 2023-05-22 | Stop reason: ALTCHOICE

## 2023-05-22 RX ORDER — EPHEDRINE SULFATE 50 MG/ML
INJECTION, SOLUTION INTRAVENOUS AS NEEDED
Status: DISCONTINUED | OUTPATIENT
Start: 2023-05-22 | End: 2023-05-22 | Stop reason: SURG

## 2023-05-22 RX ORDER — HYDROMORPHONE HYDROCHLORIDE 1 MG/ML
0.2 INJECTION, SOLUTION INTRAMUSCULAR; INTRAVENOUS; SUBCUTANEOUS EVERY 5 MIN PRN
Status: DISCONTINUED | OUTPATIENT
Start: 2023-05-22 | End: 2023-05-22

## 2023-05-22 RX ORDER — ACETAMINOPHEN 500 MG
1000 TABLET ORAL ONCE
Status: DISCONTINUED | OUTPATIENT
Start: 2023-05-22 | End: 2023-05-22 | Stop reason: HOSPADM

## 2023-05-22 RX ORDER — DEXAMETHASONE SODIUM PHOSPHATE 4 MG/ML
VIAL (ML) INJECTION AS NEEDED
Status: DISCONTINUED | OUTPATIENT
Start: 2023-05-22 | End: 2023-05-22 | Stop reason: SURG

## 2023-05-22 RX ORDER — LIDOCAINE HYDROCHLORIDE 10 MG/ML
INJECTION, SOLUTION EPIDURAL; INFILTRATION; INTRACAUDAL; PERINEURAL AS NEEDED
Status: DISCONTINUED | OUTPATIENT
Start: 2023-05-22 | End: 2023-05-22 | Stop reason: SURG

## 2023-05-22 RX ORDER — MORPHINE SULFATE 4 MG/ML
2 INJECTION, SOLUTION INTRAMUSCULAR; INTRAVENOUS EVERY 10 MIN PRN
Status: DISCONTINUED | OUTPATIENT
Start: 2023-05-22 | End: 2023-05-22

## 2023-05-22 RX ORDER — METOCLOPRAMIDE 10 MG/1
10 TABLET ORAL ONCE
Status: DISCONTINUED | OUTPATIENT
Start: 2023-05-22 | End: 2023-05-22 | Stop reason: HOSPADM

## 2023-05-22 RX ORDER — PROCHLORPERAZINE EDISYLATE 5 MG/ML
5 INJECTION INTRAMUSCULAR; INTRAVENOUS EVERY 8 HOURS PRN
Status: DISCONTINUED | OUTPATIENT
Start: 2023-05-22 | End: 2023-05-22

## 2023-05-22 RX ORDER — NALOXONE HYDROCHLORIDE 0.4 MG/ML
80 INJECTION, SOLUTION INTRAMUSCULAR; INTRAVENOUS; SUBCUTANEOUS AS NEEDED
Status: DISCONTINUED | OUTPATIENT
Start: 2023-05-22 | End: 2023-05-22

## 2023-05-22 RX ORDER — HYDROMORPHONE HYDROCHLORIDE 1 MG/ML
0.4 INJECTION, SOLUTION INTRAMUSCULAR; INTRAVENOUS; SUBCUTANEOUS EVERY 5 MIN PRN
Status: DISCONTINUED | OUTPATIENT
Start: 2023-05-22 | End: 2023-05-22

## 2023-05-22 RX ORDER — MORPHINE SULFATE 4 MG/ML
4 INJECTION, SOLUTION INTRAMUSCULAR; INTRAVENOUS EVERY 10 MIN PRN
Status: DISCONTINUED | OUTPATIENT
Start: 2023-05-22 | End: 2023-05-22

## 2023-05-22 RX ORDER — MORPHINE SULFATE 10 MG/ML
6 INJECTION, SOLUTION INTRAMUSCULAR; INTRAVENOUS EVERY 10 MIN PRN
Status: DISCONTINUED | OUTPATIENT
Start: 2023-05-22 | End: 2023-05-22

## 2023-05-22 RX ORDER — ACETAMINOPHEN 500 MG
1000 TABLET ORAL EVERY 6 HOURS PRN
Status: ON HOLD | COMMUNITY

## 2023-05-22 RX ORDER — ONDANSETRON 2 MG/ML
4 INJECTION INTRAMUSCULAR; INTRAVENOUS EVERY 6 HOURS PRN
Status: DISCONTINUED | OUTPATIENT
Start: 2023-05-22 | End: 2023-05-22

## 2023-05-22 RX ORDER — ROCURONIUM BROMIDE 10 MG/ML
INJECTION, SOLUTION INTRAVENOUS AS NEEDED
Status: DISCONTINUED | OUTPATIENT
Start: 2023-05-22 | End: 2023-05-22 | Stop reason: SURG

## 2023-05-22 RX ORDER — METOPROLOL TARTRATE 5 MG/5ML
2.5 INJECTION INTRAVENOUS ONCE
Status: DISCONTINUED | OUTPATIENT
Start: 2023-05-22 | End: 2023-05-22

## 2023-05-22 RX ADMIN — ONDANSETRON 4 MG: 2 INJECTION INTRAMUSCULAR; INTRAVENOUS at 16:02:00

## 2023-05-22 RX ADMIN — EPHEDRINE SULFATE 10 MG: 50 INJECTION, SOLUTION INTRAVENOUS at 16:09:00

## 2023-05-22 RX ADMIN — CLINDAMYCIN PHOSPHATE 900 MG: 150 INJECTION, SOLUTION INTRAVENOUS at 16:07:00

## 2023-05-22 RX ADMIN — LIDOCAINE HYDROCHLORIDE 50 MG: 10 INJECTION, SOLUTION EPIDURAL; INFILTRATION; INTRACAUDAL; PERINEURAL at 16:02:00

## 2023-05-22 RX ADMIN — SODIUM CHLORIDE, SODIUM LACTATE, POTASSIUM CHLORIDE, CALCIUM CHLORIDE: 600; 310; 30; 20 INJECTION, SOLUTION INTRAVENOUS at 15:57:00

## 2023-05-22 RX ADMIN — PHENYLEPHRINE HCL 100 MCG: 10 MG/ML VIAL (ML) INJECTION at 16:09:00

## 2023-05-22 RX ADMIN — ROCURONIUM BROMIDE 30 MG: 10 INJECTION, SOLUTION INTRAVENOUS at 16:02:00

## 2023-05-22 RX ADMIN — PHENYLEPHRINE HCL 100 MCG: 10 MG/ML VIAL (ML) INJECTION at 16:08:00

## 2023-05-22 RX ADMIN — EPHEDRINE SULFATE 10 MG: 50 INJECTION, SOLUTION INTRAVENOUS at 16:02:00

## 2023-05-22 RX ADMIN — DEXAMETHASONE SODIUM PHOSPHATE 4 MG: 4 MG/ML VIAL (ML) INJECTION at 16:02:00

## 2023-05-22 NOTE — OPERATIVE REPORT
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

## 2023-05-22 NOTE — ANESTHESIA PROCEDURE NOTES
Airway  Date/Time: 5/22/2023 4:03 PM  Urgency: Elective      General Information and Staff    Patient location during procedure: OR  Anesthesiologist: Matt Nichole MD  Performed: anesthesiologist   Performed by: Matt Nichole MD  Authorized by: Matt Nichole MD      Indications and Patient Condition  Indications for airway management: anesthesia  Spontaneous Ventilation: absent  Sedation level: deep  Preoxygenated: yes  Patient position: sniffing  Mask difficulty assessment: 1 - vent by mask    Final Airway Details  Final airway type: endotracheal airway      Successful airway: ETT  Cuffed: yes   Successful intubation technique: direct laryngoscopy  Endotracheal tube insertion site: oral  Blade: GlideScope  Blade size: #4  ETT size (mm): 7.0    Cormack-Lehane Classification: grade IIB - view of arytenoids or posterior of glottis only  Placement verified by: chest auscultation and capnometry   Measured from: teeth  Number of attempts at approach: 1

## 2023-05-22 NOTE — ANESTHESIA POSTPROCEDURE EVALUATION
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     [address] [place] [address] AN Post Evaluation:   Patient Evaluated in PACU  Patient Participation: complete - patient participated  Level of Consciousness: awake  Pain Score: 0  Pain Management: adequate  Airway Patency:patent  Dental exam unchanged from preop  Yes    Cardiovascular Status: acceptable  Respiratory Status: acceptable and nasal cannula  Postoperative Hydration acceptable      Betty Yates MD  5/22/2023 4:39 PM

## 2023-05-23 NOTE — OPERATIVE REPORT
[facility]    PATIENT'S NAME: Melvin Rios   ATTENDING PHYSICIAN: Anyi Chaparro MD   OPERATING PHYSICIAN: Anyi Chaparro MD   PATIENT ACCOUNT#:   [id number]    LOCATION:  [place] [address]  MEDICAL RECORD #:   [id number]       YOB: 1970  ADMISSION DATE:       05/22/2023      OPERATION DATE:  05/22/2023    OPERATIVE REPORT      PREOPERATIVE DIAGNOSIS:  Neurogenic bladder and history of urinary tract infection. POSTOPERATIVE DIAGNOSIS:  Neurogenic bladder and history of urinary tract infection. PROCEDURE:  Cystoscopy, placement of suprapubic tube. ANESTHESIA:  General.    ESTIMATED BLOOD LOSS:  None. SPECIMEN:  None. DRAIN:  Suprapubic tube. INDICATIONS:  The patient is a 78-year-old male with a history of a neurogenic bladder related to CVA and urinary tract infections including more recently where he was on antibiotics. He desires suprapubic tube placement for drainage. OPERATIVE TECHNIQUE:  Patient was brought to the operative suite, administered general anesthetic as well as IV antibiotic. He was placed in lithotomy position, prepped and draped in sterile fashion. Patient had significant ventral urethral erosion. Following this, a 22-Colombian cystoscope was inserted into patient's urethra which appeared to be normal.  Bladder was normal as well as prostatic urethra. Bladder was distended. Using a Bard and a 12-Colombian trocar, the catheter was placed suprapubically under direct visualization. It was inflated to 15 mL. Seated at the dome of the bladder, irrigated and aspirated readily, was sutured in place. Following this, patient awoke without difficulty and was taken to the recovery room without incident. He will be discharged home with catheter. Follow up in 6 weeks' time for upsizing to a [facility].     Dictated By Anyi Chaparor MD  d: 05/22/2023 16:25:28  t: 05/22/2023 23:02:54  Job [id number]  /

## 2023-05-26 ENCOUNTER — HOSPITAL ENCOUNTER (INPATIENT)
Facility: HOSPITAL | Age: 53
LOS: 4 days | Discharge: NURSING, REHAB OR LONG-TERM CARE | End: 2023-05-30
Attending: EMERGENCY MEDICINE | Admitting: INTERNAL MEDICINE
Payer: MEDICAID

## 2023-05-26 DIAGNOSIS — R31.0 GROSS HEMATURIA: Primary | ICD-10-CM

## 2023-05-26 LAB
ALBUMIN SERPL-MCNC: 2.8 G/DL (ref 3.4–5)
ALBUMIN/GLOB SERPL: 0.7 {RATIO} (ref 1–2)
ALP LIVER SERPL-CCNC: 82 U/L
ALT SERPL-CCNC: 25 U/L
ANION GAP SERPL CALC-SCNC: 6 MMOL/L (ref 0–18)
ANTIBODY SCREEN: NEGATIVE
AST SERPL-CCNC: 19 U/L (ref 15–37)
BASOPHILS # BLD AUTO: 0.03 X10(3) UL (ref 0–0.2)
BASOPHILS NFR BLD AUTO: 0.3 %
BILIRUB SERPL-MCNC: 0.3 MG/DL (ref 0.1–2)
BILIRUB UR QL CFM: NEGATIVE
BUN BLD-MCNC: 18 MG/DL (ref 7–18)
CALCIUM BLD-MCNC: 9.2 MG/DL (ref 8.5–10.1)
CHLORIDE SERPL-SCNC: 107 MMOL/L (ref 98–112)
CO2 SERPL-SCNC: 23 MMOL/L (ref 21–32)
CREAT BLD-MCNC: 1.36 MG/DL
EOSINOPHIL # BLD AUTO: 0.14 X10(3) UL (ref 0–0.7)
EOSINOPHIL NFR BLD AUTO: 1.6 %
ERYTHROCYTE [DISTWIDTH] IN BLOOD BY AUTOMATED COUNT: 14.1 %
GFR SERPLBLD BASED ON 1.73 SQ M-ARVRAT: 61 ML/MIN/1.73M2 (ref 60–?)
GLOBULIN PLAS-MCNC: 4.3 G/DL (ref 2.8–4.4)
GLUCOSE BLD-MCNC: 97 MG/DL (ref 70–99)
GLUCOSE UR STRIP.AUTO-MCNC: 100 MG/DL
HCT VFR BLD AUTO: 30.5 %
HGB BLD-MCNC: 10 G/DL
IMM GRANULOCYTES # BLD AUTO: 0.02 X10(3) UL (ref 0–1)
IMM GRANULOCYTES NFR BLD: 0.2 %
KETONES UR STRIP.AUTO-MCNC: 40 MG/DL
LYMPHOCYTES # BLD AUTO: 0.98 X10(3) UL (ref 1–4)
LYMPHOCYTES NFR BLD AUTO: 11.1 %
MCH RBC QN AUTO: 29.1 PG (ref 26–34)
MCHC RBC AUTO-ENTMCNC: 32.8 G/DL (ref 31–37)
MCV RBC AUTO: 88.7 FL
MONOCYTES # BLD AUTO: 1.18 X10(3) UL (ref 0.1–1)
MONOCYTES NFR BLD AUTO: 13.3 %
NEUTROPHILS # BLD AUTO: 6.49 X10 (3) UL (ref 1.5–7.7)
NEUTROPHILS # BLD AUTO: 6.49 X10(3) UL (ref 1.5–7.7)
NEUTROPHILS NFR BLD AUTO: 73.5 %
NITRITE UR QL STRIP.AUTO: NEGATIVE
OSMOLALITY SERPL CALC.SUM OF ELEC: 284 MOSM/KG (ref 275–295)
PH UR STRIP.AUTO: 8.5 [PH] (ref 5–8)
PLATELET # BLD AUTO: 387 10(3)UL (ref 150–450)
POTASSIUM SERPL-SCNC: 3.9 MMOL/L (ref 3.5–5.1)
PROT SERPL-MCNC: 7.1 G/DL (ref 6.4–8.2)
PROT UR STRIP.AUTO-MCNC: >=300 MG/DL
RBC # BLD AUTO: 3.44 X10(6)UL
RBC #/AREA URNS AUTO: >10 /HPF
RBC #/AREA URNS AUTO: >10 /HPF
RH BLOOD TYPE: POSITIVE
SODIUM SERPL-SCNC: 136 MMOL/L (ref 136–145)
SP GR UR STRIP.AUTO: 1.01 (ref 1–1.03)
UROBILINOGEN UR STRIP.AUTO-MCNC: >=8 MG/DL
WBC # BLD AUTO: 8.8 X10(3) UL (ref 4–11)

## 2023-05-26 PROCEDURE — 87077 CULTURE AEROBIC IDENTIFY: CPT | Performed by: EMERGENCY MEDICINE

## 2023-05-26 PROCEDURE — 87186 SC STD MICRODIL/AGAR DIL: CPT | Performed by: EMERGENCY MEDICINE

## 2023-05-26 PROCEDURE — 80053 COMPREHEN METABOLIC PANEL: CPT | Performed by: EMERGENCY MEDICINE

## 2023-05-26 PROCEDURE — 85025 COMPLETE CBC W/AUTO DIFF WBC: CPT | Performed by: EMERGENCY MEDICINE

## 2023-05-26 PROCEDURE — 87086 URINE CULTURE/COLONY COUNT: CPT | Performed by: EMERGENCY MEDICINE

## 2023-05-26 PROCEDURE — 81001 URINALYSIS AUTO W/SCOPE: CPT | Performed by: EMERGENCY MEDICINE

## 2023-05-26 PROCEDURE — 86850 RBC ANTIBODY SCREEN: CPT | Performed by: EMERGENCY MEDICINE

## 2023-05-26 PROCEDURE — 86900 BLOOD TYPING SEROLOGIC ABO: CPT | Performed by: EMERGENCY MEDICINE

## 2023-05-26 PROCEDURE — 86901 BLOOD TYPING SEROLOGIC RH(D): CPT | Performed by: EMERGENCY MEDICINE

## 2023-05-26 PROCEDURE — 81015 MICROSCOPIC EXAM OF URINE: CPT | Performed by: EMERGENCY MEDICINE

## 2023-05-26 RX ORDER — BUPROPION HYDROCHLORIDE 300 MG/1
300 TABLET ORAL DAILY
Status: DISCONTINUED | OUTPATIENT
Start: 2023-05-26 | End: 2023-05-27

## 2023-05-26 RX ORDER — CARVEDILOL 12.5 MG/1
25 TABLET ORAL 2 TIMES DAILY WITH MEALS
Status: DISCONTINUED | OUTPATIENT
Start: 2023-05-26 | End: 2023-05-30

## 2023-05-26 RX ORDER — ATORVASTATIN CALCIUM 40 MG/1
40 TABLET, FILM COATED ORAL NIGHTLY
Status: DISCONTINUED | OUTPATIENT
Start: 2023-05-27 | End: 2023-05-30

## 2023-05-26 RX ORDER — PROCHLORPERAZINE EDISYLATE 5 MG/ML
5 INJECTION INTRAMUSCULAR; INTRAVENOUS EVERY 8 HOURS PRN
Status: DISCONTINUED | OUTPATIENT
Start: 2023-05-26 | End: 2023-05-30

## 2023-05-26 RX ORDER — AMLODIPINE BESYLATE 5 MG/1
10 TABLET ORAL EVERY EVENING
Status: DISCONTINUED | OUTPATIENT
Start: 2023-05-26 | End: 2023-05-30

## 2023-05-26 RX ORDER — ACETAMINOPHEN 500 MG
500 TABLET ORAL EVERY 4 HOURS PRN
Status: DISCONTINUED | OUTPATIENT
Start: 2023-05-26 | End: 2023-05-30

## 2023-05-26 RX ORDER — SODIUM CHLORIDE 9 MG/ML
INJECTION, SOLUTION INTRAVENOUS CONTINUOUS
Status: ACTIVE | OUTPATIENT
Start: 2023-05-26 | End: 2023-05-26

## 2023-05-26 RX ORDER — LEVOFLOXACIN 5 MG/ML
500 INJECTION, SOLUTION INTRAVENOUS ONCE
Status: DISCONTINUED | OUTPATIENT
Start: 2023-05-26 | End: 2023-05-26

## 2023-05-26 RX ORDER — ONDANSETRON 2 MG/ML
4 INJECTION INTRAMUSCULAR; INTRAVENOUS EVERY 6 HOURS PRN
Status: DISCONTINUED | OUTPATIENT
Start: 2023-05-26 | End: 2023-05-30

## 2023-05-26 RX ORDER — BISACODYL 10 MG
10 SUPPOSITORY, RECTAL RECTAL
Status: DISCONTINUED | OUTPATIENT
Start: 2023-05-26 | End: 2023-05-30

## 2023-05-26 RX ORDER — POLYETHYLENE GLYCOL 3350 17 G/17G
17 POWDER, FOR SOLUTION ORAL DAILY PRN
Status: DISCONTINUED | OUTPATIENT
Start: 2023-05-26 | End: 2023-05-30

## 2023-05-26 RX ORDER — SENNOSIDES 8.6 MG
17.2 TABLET ORAL NIGHTLY PRN
Status: DISCONTINUED | OUTPATIENT
Start: 2023-05-26 | End: 2023-05-30

## 2023-05-26 RX ORDER — HYDRALAZINE HYDROCHLORIDE 50 MG/1
100 TABLET, FILM COATED ORAL 3 TIMES DAILY
Status: DISCONTINUED | OUTPATIENT
Start: 2023-05-26 | End: 2023-05-30

## 2023-05-26 NOTE — ED QUICK NOTES
Orders for admission, patient is aware of plan and ready to go upstairs. Any questions, please call ED RN Lis Portal at extension [phone].      Patient Covid vaccination status: Unvaccinated     COVID Test Ordered in ED: None    COVID Suspicion at Admission: N/A    Running Infusions:  None    Mental Status/LOC at time of transport: A&Ox4    Other pertinent information:   CIWA score: N/A   NIH score:  N/A

## 2023-05-26 NOTE — PROGRESS NOTES
NURSING ADMISSION NOTE      Patient admitted via Cart  Oriented to room. Safety precautions initiated. Bed in low position. Call light in reach. 1305: Patient arrived from ED in stable condition. 1612: Patient is alert and oriented. Can be forgetful at times. Has slurred speech d/t hx of stroke. On room air. Abdomen is soft/ non-distended. PEG located LUQ. Patient tolerating regular diet. CBI infusing into suprapubic cath with pink colored urine noted. No clots. Some swelling noted to penis. On IV Abx. Call light within reach.

## 2023-05-26 NOTE — ED INITIAL ASSESSMENT (HPI)
PT s/p suprapubic castillo placement Tuesday 5/23. Pt w/ pain at surgical site. Hematuria presented yesterday. Low grade temp. Pain unrelieved by norco. Pt a/ox3 at baseline, hx of stroke. Denies blood thinner use.

## 2023-05-26 NOTE — ED QUICK NOTES
Second 3L bag started. Pt continues to tolerate the CBI well, urine is very light pink, MD aware, advised that CBI continues to run.

## 2023-05-27 LAB
ANION GAP SERPL CALC-SCNC: 6 MMOL/L (ref 0–18)
BASOPHILS # BLD AUTO: 0.02 X10(3) UL (ref 0–0.2)
BASOPHILS NFR BLD AUTO: 0.3 %
BUN BLD-MCNC: 21 MG/DL (ref 7–18)
CALCIUM BLD-MCNC: 8.9 MG/DL (ref 8.5–10.1)
CHLORIDE SERPL-SCNC: 109 MMOL/L (ref 98–112)
CO2 SERPL-SCNC: 20 MMOL/L (ref 21–32)
CREAT BLD-MCNC: 1.18 MG/DL
EOSINOPHIL # BLD AUTO: 0.23 X10(3) UL (ref 0–0.7)
EOSINOPHIL NFR BLD AUTO: 3 %
ERYTHROCYTE [DISTWIDTH] IN BLOOD BY AUTOMATED COUNT: 14.2 %
GFR SERPLBLD BASED ON 1.73 SQ M-ARVRAT: 73 ML/MIN/1.73M2 (ref 60–?)
GLUCOSE BLD-MCNC: 93 MG/DL (ref 70–99)
HCT VFR BLD AUTO: 27.9 %
HGB BLD-MCNC: 8.9 G/DL
IMM GRANULOCYTES # BLD AUTO: 0.01 X10(3) UL (ref 0–1)
IMM GRANULOCYTES NFR BLD: 0.1 %
LYMPHOCYTES # BLD AUTO: 0.89 X10(3) UL (ref 1–4)
LYMPHOCYTES NFR BLD AUTO: 11.7 %
MAGNESIUM SERPL-MCNC: 1.8 MG/DL (ref 1.6–2.6)
MCH RBC QN AUTO: 28.8 PG (ref 26–34)
MCHC RBC AUTO-ENTMCNC: 31.9 G/DL (ref 31–37)
MCV RBC AUTO: 90.3 FL
MONOCYTES # BLD AUTO: 0.94 X10(3) UL (ref 0.1–1)
MONOCYTES NFR BLD AUTO: 12.4 %
NEUTROPHILS # BLD AUTO: 5.49 X10 (3) UL (ref 1.5–7.7)
NEUTROPHILS # BLD AUTO: 5.49 X10(3) UL (ref 1.5–7.7)
NEUTROPHILS NFR BLD AUTO: 72.5 %
OSMOLALITY SERPL CALC.SUM OF ELEC: 283 MOSM/KG (ref 275–295)
PLATELET # BLD AUTO: 406 10(3)UL (ref 150–450)
POTASSIUM SERPL-SCNC: 3.5 MMOL/L (ref 3.5–5.1)
RBC # BLD AUTO: 3.09 X10(6)UL
SODIUM SERPL-SCNC: 135 MMOL/L (ref 136–145)
WBC # BLD AUTO: 7.6 X10(3) UL (ref 4–11)

## 2023-05-27 PROCEDURE — 85025 COMPLETE CBC W/AUTO DIFF WBC: CPT | Performed by: HOSPITALIST

## 2023-05-27 PROCEDURE — 80048 BASIC METABOLIC PNL TOTAL CA: CPT | Performed by: HOSPITALIST

## 2023-05-27 PROCEDURE — 83735 ASSAY OF MAGNESIUM: CPT | Performed by: HOSPITALIST

## 2023-05-27 RX ORDER — MAGNESIUM OXIDE 400 MG/1
400 TABLET ORAL ONCE
Status: COMPLETED | OUTPATIENT
Start: 2023-05-27 | End: 2023-05-27

## 2023-05-27 RX ORDER — BUPROPION HYDROCHLORIDE 150 MG/1
150 TABLET, EXTENDED RELEASE ORAL 2 TIMES DAILY
Status: DISCONTINUED | OUTPATIENT
Start: 2023-05-27 | End: 2023-05-30

## 2023-05-27 NOTE — PLAN OF CARE
Upon assessment, A&Ox3 to 4. Forgetful. Expressive aphasia. R sided weakness. RA.  WDL. IS encouraged. Bunny boots. Tolerating diet, BM+. PEG tube intact and clamped. Suprapubic cath and cath intact. CBI running slow, clear, yellow output. Comfort cares provided, pillow support, repositioning. Denies SOB, CP, lightheadedness, N/V, and pain at this time. POC discussed, all questions answered and concerns addressed. Safety precautions in place. Frequent rounds performed. 1200 - CBI clamped by urology. Clear, yellow output noted.

## 2023-05-27 NOTE — PHYSICAL THERAPY NOTE
PT order received, chart reviewed. Pt presents from LTC, is dependent with main lift for mobility. Will dc at this time as no skilled intervention is required. Rec return to LTC with ongoing use of lift equipment for all mobility.

## 2023-05-27 NOTE — PROGRESS NOTES
Patient A&Ox3-4 (forgetful/expressive aphasia/right side minimal movement d/t previous stroke), regular diet, RA. Suprapubic and castillo catheter placed, CBI running at a slow rate. PEG tube is clamped and flushed. Patient bed bound at baseline. Sacrum has some pinkish coloring. Patient updated on plan of care, all questions answered.

## 2023-05-28 LAB
ANION GAP SERPL CALC-SCNC: 4 MMOL/L (ref 0–18)
BUN BLD-MCNC: 18 MG/DL (ref 7–18)
CALCIUM BLD-MCNC: 9 MG/DL (ref 8.5–10.1)
CHLORIDE SERPL-SCNC: 112 MMOL/L (ref 98–112)
CO2 SERPL-SCNC: 22 MMOL/L (ref 21–32)
CREAT BLD-MCNC: 0.97 MG/DL
ERYTHROCYTE [DISTWIDTH] IN BLOOD BY AUTOMATED COUNT: 14.1 %
GFR SERPLBLD BASED ON 1.73 SQ M-ARVRAT: 92 ML/MIN/1.73M2 (ref 60–?)
GLUCOSE BLD-MCNC: 97 MG/DL (ref 70–99)
HCT VFR BLD AUTO: 27.2 %
HGB BLD-MCNC: 9 G/DL
MAGNESIUM SERPL-MCNC: 1.8 MG/DL (ref 1.6–2.6)
MCH RBC QN AUTO: 28.8 PG (ref 26–34)
MCHC RBC AUTO-ENTMCNC: 33.1 G/DL (ref 31–37)
MCV RBC AUTO: 86.9 FL
OSMOLALITY SERPL CALC.SUM OF ELEC: 288 MOSM/KG (ref 275–295)
PLATELET # BLD AUTO: 449 10(3)UL (ref 150–450)
POTASSIUM SERPL-SCNC: 3.3 MMOL/L (ref 3.5–5.1)
RBC # BLD AUTO: 3.13 X10(6)UL
SODIUM SERPL-SCNC: 138 MMOL/L (ref 136–145)
WBC # BLD AUTO: 7.7 X10(3) UL (ref 4–11)

## 2023-05-28 PROCEDURE — 83735 ASSAY OF MAGNESIUM: CPT | Performed by: HOSPITALIST

## 2023-05-28 PROCEDURE — 85027 COMPLETE CBC AUTOMATED: CPT | Performed by: HOSPITALIST

## 2023-05-28 PROCEDURE — 80048 BASIC METABOLIC PNL TOTAL CA: CPT | Performed by: HOSPITALIST

## 2023-05-28 RX ORDER — VANCOMYCIN HYDROCHLORIDE
15 EVERY 12 HOURS
Status: COMPLETED | OUTPATIENT
Start: 2023-05-28 | End: 2023-05-30

## 2023-05-28 RX ORDER — MAGNESIUM OXIDE 400 MG/1
400 TABLET ORAL ONCE
Status: COMPLETED | OUTPATIENT
Start: 2023-05-28 | End: 2023-05-28

## 2023-05-28 NOTE — PROGRESS NOTES
Alert and oriented 3-4. Forgetful at times. Right sided weakness and expressive aphagia. Denies difficulty in breathing. Denies pain. Bunny boots in place. Abdomen soft and nontender. PEG tube in placed. Intact and clamped. Medications given through PEG. Kirkland catheter intact. Isolation precautions in place. Tolerating diet. Updated on plan of care. Call light within reach.      1345: Dr. Lilly Mccarthy paged for new consult

## 2023-05-28 NOTE — PLAN OF CARE
A&Ox3, disoriented to situation. Expressive aphasia 2/2 stroke. Right sided weakness. Irritable. Refusing initial assessment. Pt reassured and importance of eventual skin assessment explained. VSS. RA. . Denies chest pain and SOB. Contact isolation per order  GI: Abdomen soft, nondistended. Denies passing gas upon assessment. Denies nausea. : Voids via urethral castillo. Pale/yellow urine. SU suprapubic catheter at time of note, clamped. CBI clamped. Pain controlled at this time. Bed rest.  Diet: Regular diet  IVF S/L, TKO for IVAbx  All appropriate safety measures in place. All questions and concerns addressed.

## 2023-05-29 LAB
ANION GAP SERPL CALC-SCNC: 5 MMOL/L (ref 0–18)
BUN BLD-MCNC: 14 MG/DL (ref 7–18)
CALCIUM BLD-MCNC: 8.8 MG/DL (ref 8.5–10.1)
CHLORIDE SERPL-SCNC: 111 MMOL/L (ref 98–112)
CO2 SERPL-SCNC: 23 MMOL/L (ref 21–32)
CREAT BLD-MCNC: 0.79 MG/DL
ERYTHROCYTE [DISTWIDTH] IN BLOOD BY AUTOMATED COUNT: 13.9 %
GFR SERPLBLD BASED ON 1.73 SQ M-ARVRAT: 105 ML/MIN/1.73M2 (ref 60–?)
GLUCOSE BLD-MCNC: 110 MG/DL (ref 70–99)
GLUCOSE BLD-MCNC: 99 MG/DL (ref 70–99)
HCT VFR BLD AUTO: 26.6 %
HGB BLD-MCNC: 8.8 G/DL
MAGNESIUM SERPL-MCNC: 1.8 MG/DL (ref 1.6–2.6)
MCH RBC QN AUTO: 29 PG (ref 26–34)
MCHC RBC AUTO-ENTMCNC: 33.1 G/DL (ref 31–37)
MCV RBC AUTO: 87.8 FL
OSMOLALITY SERPL CALC.SUM OF ELEC: 289 MOSM/KG (ref 275–295)
PLATELET # BLD AUTO: 446 10(3)UL (ref 150–450)
POTASSIUM SERPL-SCNC: 3.5 MMOL/L (ref 3.5–5.1)
RBC # BLD AUTO: 3.03 X10(6)UL
SODIUM SERPL-SCNC: 139 MMOL/L (ref 136–145)
WBC # BLD AUTO: 7 X10(3) UL (ref 4–11)

## 2023-05-29 PROCEDURE — 82962 GLUCOSE BLOOD TEST: CPT

## 2023-05-29 PROCEDURE — 85027 COMPLETE CBC AUTOMATED: CPT | Performed by: HOSPITALIST

## 2023-05-29 PROCEDURE — 83735 ASSAY OF MAGNESIUM: CPT | Performed by: HOSPITALIST

## 2023-05-29 PROCEDURE — 80048 BASIC METABOLIC PNL TOTAL CA: CPT | Performed by: HOSPITALIST

## 2023-05-29 RX ORDER — MAGNESIUM SULFATE HEPTAHYDRATE 40 MG/ML
2 INJECTION, SOLUTION INTRAVENOUS ONCE
Status: COMPLETED | OUTPATIENT
Start: 2023-05-29 | End: 2023-05-29

## 2023-05-29 RX ORDER — POTASSIUM CHLORIDE 20 MEQ/1
40 TABLET, EXTENDED RELEASE ORAL ONCE
Status: COMPLETED | OUTPATIENT
Start: 2023-05-29 | End: 2023-05-29

## 2023-05-29 NOTE — PLAN OF CARE
Patient alert and oriented x3. Rt sided weakness and expressive aphagia. Patient denies pain. Patient had a large BM. 2 person assist. Peg tube. Suprapubic catheter clamped. Kirkland catheter in placed. Isolation measures in place. Call light within reach. Problem: PAIN - ADULT  Goal: Verbalizes/displays adequate comfort level or patient's stated pain goal  Description: INTERVENTIONS:  - Encourage pt to monitor pain and request assistance  - Assess pain using appropriate pain scale  - Administer analgesics based on type and severity of pain and evaluate response  - Implement non-pharmacological measures as appropriate and evaluate response  - Consider cultural and social influences on pain and pain management  - Manage/alleviate anxiety  - Utilize distraction and/or relaxation techniques  - Monitor for opioid side effects  - Notify MD/LIP if interventions unsuccessful or patient reports new pain  - Anticipate increased pain with activity and pre-medicate as appropriate  Outcome: Progressing     Problem: RISK FOR INFECTION - ADULT  Goal: Absence of fever/infection during anticipated neutropenic period  Description: INTERVENTIONS  - Monitor WBC  - Administer growth factors as ordered  - Implement neutropenic guidelines  Outcome: Progressing     Problem: SAFETY ADULT - FALL  Goal: Free from fall injury  Description: INTERVENTIONS:  - Assess pt frequently for physical needs  - Identify cognitive and physical deficits and behaviors that affect risk of falls.   - [place] fall precautions as indicated by assessment.  - Educate pt/family on patient safety including physical limitations  - Instruct pt to call for assistance with activity based on assessment  - Modify environment to reduce risk of injury  - Provide assistive devices as appropriate  - Consider OT/PT consult to assist with strengthening/mobility  - Encourage toileting schedule  Outcome: Progressing     Problem: DISCHARGE PLANNING  Goal: Discharge to home or other facility with appropriate resources  Description: INTERVENTIONS:  - Identify barriers to discharge w/pt and caregiver  - Include patient/family/discharge partner in discharge planning  - Arrange for needed discharge resources and transportation as appropriate  - Identify discharge learning needs (meds, wound care, etc)  - Arrange for interpreters to assist at discharge as needed  - Consider post-discharge preferences of patient/family/discharge partner  - Complete POLST form as appropriate  - Assess patient's ability to be responsible for managing their own health  - Refer to Case Management Department for coordinating discharge planning if the patient needs post-hospital services based on physician/LIP order or complex needs related to functional status, cognitive ability or social support system  Outcome: Progressing     Problem: Patient/Family Goals  Goal: Patient/Family Long Term Goal  Description: Patient's Long Term Goal: Discharge Patient    Interventions:  - Clamp CBI  - See additional Care Plan goals for specific interventions  Outcome: Progressing  Goal: Patient/Family Short Term Goal  Description: Patient's Short Term Goal: Pain Management    Interventions:   - Administer PRN Pain Medications  - See additional Care Plan goals for specific interventions  Outcome: Progressing

## 2023-05-29 NOTE — PLAN OF CARE
Patient A&Ox4, RA, expressive aphasia d/t stroke. Bedbound. R side weakness. Patient denies pain. Kirkland catheter intact w/ clear, yellow urine. Suprapubic catheter clamped. Patient tolerating regular diet. Reports low appetite. Peg tube intact and clamped. Receiving crushed meds through tube and tolerating. BM today. Receiving IVF. Plan to restart xarelto tomorrow. Plan of care discussed w/ patient. Problem: PAIN - ADULT  Goal: Verbalizes/displays adequate comfort level or patient's stated pain goal  Description: INTERVENTIONS:  - Encourage pt to monitor pain and request assistance  - Assess pain using appropriate pain scale  - Administer analgesics based on type and severity of pain and evaluate response  - Implement non-pharmacological measures as appropriate and evaluate response  - Consider cultural and social influences on pain and pain management  - Manage/alleviate anxiety  - Utilize distraction and/or relaxation techniques  - Monitor for opioid side effects  - Notify MD/LIP if interventions unsuccessful or patient reports new pain  - Anticipate increased pain with activity and pre-medicate as appropriate  Outcome: Progressing     Problem: RISK FOR INFECTION - ADULT  Goal: Absence of fever/infection during anticipated neutropenic period  Description: INTERVENTIONS  - Monitor WBC  - Administer growth factors as ordered  - Implement neutropenic guidelines  Outcome: Progressing     Problem: SAFETY ADULT - FALL  Goal: Free from fall injury  Description: INTERVENTIONS:  - Assess pt frequently for physical needs  - Identify cognitive and physical deficits and behaviors that affect risk of falls.   - [place] fall precautions as indicated by assessment.  - Educate pt/family on patient safety including physical limitations  - Instruct pt to call for assistance with activity based on assessment  - Modify environment to reduce risk of injury  - Provide assistive devices as appropriate  - Consider OT/PT consult to assist with strengthening/mobility  - Encourage toileting schedule  Outcome: Progressing     Problem: DISCHARGE PLANNING  Goal: Discharge to home or other facility with appropriate resources  Description: INTERVENTIONS:  - Identify barriers to discharge w/pt and caregiver  - Include patient/family/discharge partner in discharge planning  - Arrange for needed discharge resources and transportation as appropriate  - Identify discharge learning needs (meds, wound care, etc)  - Arrange for interpreters to assist at discharge as needed  - Consider post-discharge preferences of patient/family/discharge partner  - Complete POLST form as appropriate  - Assess patient's ability to be responsible for managing their own health  - Refer to Case Management Department for coordinating discharge planning if the patient needs post-hospital services based on physician/LIP order or complex needs related to functional status, cognitive ability or social support system  Outcome: Progressing     Problem: Patient/Family Goals  Goal: Patient/Family Long Term Goal  Description: Patient's Long Term Goal: Discharge Patient    Interventions:  - Clamp CBI  - See additional Care Plan goals for specific interventions  Outcome: Progressing  Goal: Patient/Family Short Term Goal  Description: Patient's Short Term Goal: Pain Management    Interventions:   - Administer PRN Pain Medications  - See additional Care Plan goals for specific interventions  Outcome: Progressing

## 2023-05-30 VITALS
DIASTOLIC BLOOD PRESSURE: 76 MMHG | RESPIRATION RATE: 18 BRPM | OXYGEN SATURATION: 95 % | TEMPERATURE: 99 F | BODY MASS INDEX: 31 KG/M2 | HEART RATE: 85 BPM | WEIGHT: 216 LBS | SYSTOLIC BLOOD PRESSURE: 152 MMHG

## 2023-05-30 LAB
ANION GAP SERPL CALC-SCNC: 3 MMOL/L (ref 0–18)
BUN BLD-MCNC: 13 MG/DL (ref 7–18)
CALCIUM BLD-MCNC: 8.8 MG/DL (ref 8.5–10.1)
CHLORIDE SERPL-SCNC: 113 MMOL/L (ref 98–112)
CO2 SERPL-SCNC: 24 MMOL/L (ref 21–32)
CREAT BLD-MCNC: 0.79 MG/DL
ERYTHROCYTE [DISTWIDTH] IN BLOOD BY AUTOMATED COUNT: 13.8 %
GFR SERPLBLD BASED ON 1.73 SQ M-ARVRAT: 105 ML/MIN/1.73M2 (ref 60–?)
GLUCOSE BLD-MCNC: 104 MG/DL (ref 70–99)
HCT VFR BLD AUTO: 27.9 %
HGB BLD-MCNC: 9 G/DL
MAGNESIUM SERPL-MCNC: 1.9 MG/DL (ref 1.6–2.6)
MCH RBC QN AUTO: 29.1 PG (ref 26–34)
MCHC RBC AUTO-ENTMCNC: 32.3 G/DL (ref 31–37)
MCV RBC AUTO: 90.3 FL
OSMOLALITY SERPL CALC.SUM OF ELEC: 290 MOSM/KG (ref 275–295)
PLATELET # BLD AUTO: 454 10(3)UL (ref 150–450)
POTASSIUM SERPL-SCNC: 3.6 MMOL/L (ref 3.5–5.1)
RBC # BLD AUTO: 3.09 X10(6)UL
SODIUM SERPL-SCNC: 140 MMOL/L (ref 136–145)
WBC # BLD AUTO: 6.1 X10(3) UL (ref 4–11)

## 2023-05-30 PROCEDURE — 85027 COMPLETE CBC AUTOMATED: CPT | Performed by: HOSPITALIST

## 2023-05-30 PROCEDURE — 83735 ASSAY OF MAGNESIUM: CPT | Performed by: HOSPITALIST

## 2023-05-30 PROCEDURE — 80048 BASIC METABOLIC PNL TOTAL CA: CPT | Performed by: HOSPITALIST

## 2023-05-30 RX ORDER — CARVEDILOL 25 MG/1
25 TABLET ORAL 2 TIMES DAILY WITH MEALS
Qty: 60 TABLET | Refills: 0 | Status: SHIPPED | OUTPATIENT
Start: 2023-05-30

## 2023-05-30 RX ORDER — CLOTRIMAZOLE 1 %
1 CREAM (GRAM) TOPICAL 2 TIMES DAILY
Qty: 3 G | Refills: 0 | Status: SHIPPED | OUTPATIENT
Start: 2023-05-30

## 2023-05-30 RX ORDER — BUPROPION HYDROCHLORIDE 150 MG/1
300 TABLET ORAL DAILY
Qty: 60 TABLET | Refills: 0 | Status: SHIPPED | OUTPATIENT
Start: 2023-05-30

## 2023-05-30 NOTE — PROGRESS NOTES
NURSING DISCHARGE NOTE    Discharged Nursing home via Ambulance. Accompanied by Support staff  Belongings Taken by patient/family. Patient discharged home in stable condition. Patient left the floor via stretcher and was to be transported by ambulance back to [facility]. Colin notified of patient's discharge. Report called to BobRN at [facility] at 1650. Updated Bob on patient's admitting diagnosis and treatments during stay, recent set of vitals given, suprapubic catheter to gravity drainage, completed all antibiotics, PEG tube clamped, updated on current physical assessment, informed of need for follow up with Urology in 1 month. Bob stated understanding of discharge instructions and had no further questions. Saline lock removed.

## 2023-05-30 NOTE — PROGRESS NOTES
1135: Called Amber to inquire about discharge clearance. Amber stated to remove castillo, place castillo bag to suprapubic catheter and may discharge.

## 2023-05-30 NOTE — PLAN OF CARE
A&Ox4 delayed responses with some slurring of words. VSS. RA. . Denies chest pain and SOB. GI: Abdomen soft, nondistended. Passage of gas. Denies nausea. PEG tube to umbilicus- clamped. : Kirkland draining clear yellow urine, suprapubic catheter clamped. Pain managed with PRN pain medications per MAR. Diet: Regular/ general- tolerating. IVF running per order. All appropriate safety measures in place. All questions and concerns addressed.

## 2023-05-30 NOTE — CM/SW NOTE
05/30/23 1200   Discharge disposition   Expected discharge disposition Skilled Nurs  (care home)   Post Acute Care Provider Milagros Iverson     Pt medically cleared to return to LTC facility, MBM-N today. Clinical updates sent to facility via [facility]. Transport arranged for 5 pm today. PCS updated and available for RN to print. VM left for pt's brother, Cely Lay, with update. RN updated and agreeable with discharge plan.     RENAN Gallegos [phone]   [facility] [facility] [address]: [address] [facility], BSN, RN-BC    [id number]

## 2023-06-01 ENCOUNTER — ORDER TRANSCRIPTION (OUTPATIENT)
Dept: ADMINISTRATIVE | Facility: HOSPITAL | Age: 53
End: 2023-06-01

## 2023-06-01 DIAGNOSIS — R94.39 ABNORMAL NUCLEAR STRESS TEST: Primary | ICD-10-CM

## 2024-02-22 ENCOUNTER — HOSPITAL ENCOUNTER (EMERGENCY)
Facility: HOSPITAL | Age: 54
Discharge: HOME | End: 2024-02-22
Attending: EMERGENCY MEDICINE
Payer: MEDICAID

## 2024-02-22 VITALS
WEIGHT: 216.06 LBS | TEMPERATURE: 98 F | OXYGEN SATURATION: 97 % | SYSTOLIC BLOOD PRESSURE: 153 MMHG | RESPIRATION RATE: 16 BRPM | BODY MASS INDEX: 31 KG/M2 | DIASTOLIC BLOOD PRESSURE: 72 MMHG | HEART RATE: 66 BPM

## 2024-02-22 DIAGNOSIS — T83.090A MECHANICAL COMPLICATION OF SUPRAPUBIC CATHETER, INITIAL ENCOUNTER (HCC): Primary | ICD-10-CM

## 2024-02-22 PROCEDURE — 99283 EMERGENCY DEPT VISIT LOW MDM: CPT

## 2024-02-22 PROCEDURE — 51705 CHANGE OF BLADDER TUBE: CPT

## 2024-02-22 NOTE — ED PROVIDER NOTES
Patient Seen in: [facility] Emergency Department      History     Chief Complaint   Patient presents with    Cath Tube Problem     Stated Complaint: suprapubic catether problem    Subjective:   HPI    54-year-old male sent from nursing home for evaluation of suprapubic catheter.  They were trying to change the catheter but were unable to deflate the balloon so they sent him here for a malfunctioning suprapubic catheter.  He is a poor historian and says the catheter has been\" for a long time\".  He reports some pain to the area of the catheter.    Objective:   Past Medical History:   Diagnosis Date    Aphasia     Calculus of kidney     Congenital anomaly of heart (HCC)     Deep vein thrombosis (HCC)     Dysphasia     Esophageal reflux     Essential hypertension     Feeding by G-tube (HCC)     Hemiparesis (HCC)     High blood pressure     Problems with swallowing     feeding tube    Pulmonary embolism (HCC)     Renal disorder     Right hemiplegia (HCC)     Stroke (HCC)     Visual impairment               Past Surgical History:   Procedure Laterality Date    CYSTOURETHROSCOPY  12/01/2022    bilateral retrograde pyelograms                Social History     Socioeconomic History    Marital status:    Tobacco Use    Smoking status: Never    Smokeless tobacco: Never   Vaping Use    Vaping Use: Former    Quit date: 1/7/2020   Substance and Sexual Activity    Alcohol use: Not Currently    Drug use: Never              Review of Systems    Positive for stated complaint: suprapubic catether problem  Other systems are as noted in HPI.  Constitutional and vital signs reviewed.      All other systems reviewed and negative except as noted above.    Physical Exam     ED Triage Vitals [02/22/24 1521]   /72   Pulse 63   Resp 16   Temp 98 °F (36.7 °C)   Temp src Temporal   SpO2 99 %   O2 Device None (Room air)       Current:/72   Pulse 66   Temp 98 °F (36.7 °C) (Temporal)   Resp 16   Wt 98 kg   SpO2 97%    BMI 31.00 kg/m²         Physical Exam    General:  Vitals as listed.  No acute distress   Abdomen: Well appearing catheter site in the lower mid abdominal wall.  Suprapubic catheter in place.  No surrounding erythema.  No significant skin erosion.  Soft and nontender.  No abdominal masses.  No peritoneal signs   Extremities: no edema, normal peripheral pulses   Neuro: Alert oriented and nonfocal   Skin: no rashes or nodules    ED Course   Labs Reviewed - No data to display          Procedure: Suprapubic catheter exchange  The balloon and the existing catheter was deflated.  5 mL of fluid were removed.  The catheter was removed.  The area was prepped and draped in a sterile manner.  An 18 Croatian Kirkland catheter was inserted into the suprapubic region without resistance.  The balloon was filled with 5 mL of fluid.  The catheter was attached to urine bag and secured to the leg.  Immediate return of urine.         MDM      54-year-old male with suprapubic catheter sent from nursing home for evaluation.  Nursing home reports that they were trying to exchange the catheter but could not deflate the balloon to remove the catheter.    Differential includes but is not limited to balloon malfunction, stricture, a life threat.    On examination the catheter site is well-appearing.  The balloon drained easily and the catheter was removed and replaced with a new one.  Patient cleared for return to nursing home.                                       Medical Decision Making      Disposition and Plan     Clinical Impression:  1. Mechanical complication of suprapubic catheter, initial encounter ([facility])         Disposition:  Discharge  2/22/2024  3:39 pm    Follow-up:  Follow-up with primary care doctor    Follow up      [facility]  [address] [place] [id number]  [phone]  Follow up  If symptoms worsen          Medications Prescribed:  Current Discharge Medication List

## 2024-02-22 NOTE — ED INITIAL ASSESSMENT (HPI)
Patient sent from [facility] for evaluation of suprapubic catheter. Staff were attempting to change catheter and were unable to deflate balloon. Patient with previous stroke, right sided deficits, difficulty speaking,   
details…

## 2024-07-25 ENCOUNTER — HOSPITAL ENCOUNTER (INPATIENT)
Facility: HOSPITAL | Age: 54
LOS: 6 days | Discharge: NURSING, REHAB OR LONG-TERM CARE | End: 2024-08-01
Attending: EMERGENCY MEDICINE | Admitting: HOSPITALIST
Payer: MEDICAID

## 2024-07-25 ENCOUNTER — APPOINTMENT (OUTPATIENT)
Dept: GENERAL RADIOLOGY | Facility: HOSPITAL | Age: 54
End: 2024-07-25
Attending: EMERGENCY MEDICINE
Payer: MEDICAID

## 2024-07-25 DIAGNOSIS — J18.9 NOSOCOMIAL PNEUMONIA: Primary | ICD-10-CM

## 2024-07-25 DIAGNOSIS — N39.0 URINARY TRACT INFECTION ASSOCIATED WITH CYSTOSTOMY CATHETER, INITIAL ENCOUNTER (HCC): ICD-10-CM

## 2024-07-25 DIAGNOSIS — E87.1 HYPONATREMIA: ICD-10-CM
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
## 2024-07-25 DIAGNOSIS — T83.510A URINARY TRACT INFECTION ASSOCIATED WITH CYSTOSTOMY CATHETER, INITIAL ENCOUNTER (HCC): ICD-10-CM

## 2024-07-25 DIAGNOSIS — B37.0 THRUSH, ORAL: ICD-10-CM

## 2024-07-25 LAB
ALBUMIN SERPL-MCNC: 4.1 G/DL (ref 3.2–4.8)
ALBUMIN/GLOB SERPL: 1 {RATIO} (ref 1–2)
ALP LIVER SERPL-CCNC: 131 U/L
ALT SERPL-CCNC: 28 U/L
ANION GAP SERPL CALC-SCNC: 8 MMOL/L (ref 0–18)
APTT PPP: 52.1 SECONDS (ref 23–36)
AST SERPL-CCNC: 28 U/L (ref ?–34)
BASOPHILS # BLD AUTO: 0.04 X10(3) UL (ref 0–0.2)
BASOPHILS NFR BLD AUTO: 0.3 %
BILIRUB SERPL-MCNC: 0.5 MG/DL (ref 0.3–1.2)
BILIRUB UR QL STRIP.AUTO: NEGATIVE
BUN BLD-MCNC: 28 MG/DL (ref 9–23)
CALCIUM BLD-MCNC: 9.2 MG/DL (ref 8.7–10.4)
CHLORIDE SERPL-SCNC: 103 MMOL/L (ref 98–112)
CO2 SERPL-SCNC: 23 MMOL/L (ref 21–32)
COLOR UR AUTO: YELLOW
CREAT BLD-MCNC: 1.73 MG/DL
EGFRCR SERPLBLD CKD-EPI 2021: 46 ML/MIN/1.73M2 (ref 60–?)
EOSINOPHIL # BLD AUTO: 0.07 X10(3) UL (ref 0–0.7)
EOSINOPHIL NFR BLD AUTO: 0.5 %
ERYTHROCYTE [DISTWIDTH] IN BLOOD BY AUTOMATED COUNT: 13.4 %
GLOBULIN PLAS-MCNC: 4 G/DL (ref 2–3.5)
GLUCOSE BLD-MCNC: 111 MG/DL (ref 70–99)
GLUCOSE UR STRIP.AUTO-MCNC: NORMAL MG/DL
HCT VFR BLD AUTO: 34.8 %
HGB BLD-MCNC: 11.7 G/DL
IMM GRANULOCYTES # BLD AUTO: 0.08 X10(3) UL (ref 0–1)
IMM GRANULOCYTES NFR BLD: 0.6 %
INR BLD: 2.31 (ref 0.8–1.2)
LACTATE SERPL-SCNC: 0.6 MMOL/L (ref 0.5–2)
LEUKOCYTE ESTERASE UR QL STRIP.AUTO: 500
LYMPHOCYTES # BLD AUTO: 0.89 X10(3) UL (ref 1–4)
LYMPHOCYTES NFR BLD AUTO: 6.8 %
MCH RBC QN AUTO: 29.5 PG (ref 26–34)
MCHC RBC AUTO-ENTMCNC: 33.6 G/DL (ref 31–37)
MCV RBC AUTO: 87.7 FL
MONOCYTES # BLD AUTO: 1.55 X10(3) UL (ref 0.1–1)
MONOCYTES NFR BLD AUTO: 11.8 %
NEUTROPHILS # BLD AUTO: 10.48 X10 (3) UL (ref 1.5–7.7)
NEUTROPHILS # BLD AUTO: 10.48 X10(3) UL (ref 1.5–7.7)
NEUTROPHILS NFR BLD AUTO: 80 %
NITRITE UR QL STRIP.AUTO: NEGATIVE
OSMOLALITY SERPL CALC.SUM OF ELEC: 284 MOSM/KG (ref 275–295)
PH UR STRIP.AUTO: 8 [PH] (ref 5–8)
PLATELET # BLD AUTO: 429 10(3)UL (ref 150–450)
POTASSIUM SERPL-SCNC: 4.2 MMOL/L (ref 3.5–5.1)
PROT SERPL-MCNC: 8.1 G/DL (ref 5.7–8.2)
PROT UR STRIP.AUTO-MCNC: 200 MG/DL
PROTHROMBIN TIME: 25.6 SECONDS (ref 11.6–14.8)
RBC # BLD AUTO: 3.97 X10(6)UL
RBC #/AREA URNS AUTO: >10 /HPF
SODIUM SERPL-SCNC: 134 MMOL/L (ref 136–145)
SP GR UR STRIP.AUTO: 1.02 (ref 1–1.03)
UROBILINOGEN UR STRIP.AUTO-MCNC: NORMAL MG/DL
WBC # BLD AUTO: 13.1 X10(3) UL (ref 4–11)
WBC #/AREA URNS AUTO: >50 /HPF
WBC CLUMPS UR QL AUTO: PRESENT /HPF
YEAST UR QL: PRESENT /HPF

## 2024-07-25 PROCEDURE — 87184 SC STD DISK METHOD PER PLATE: CPT | Performed by: EMERGENCY MEDICINE

## 2024-07-25 PROCEDURE — 87040 BLOOD CULTURE FOR BACTERIA: CPT | Performed by: EMERGENCY MEDICINE

## 2024-07-25 PROCEDURE — 87086 URINE CULTURE/COLONY COUNT: CPT | Performed by: EMERGENCY MEDICINE

## 2024-07-25 PROCEDURE — 85730 THROMBOPLASTIN TIME PARTIAL: CPT | Performed by: EMERGENCY MEDICINE

## 2024-07-25 PROCEDURE — 87186 SC STD MICRODIL/AGAR DIL: CPT | Performed by: EMERGENCY MEDICINE

## 2024-07-25 PROCEDURE — 85610 PROTHROMBIN TIME: CPT | Performed by: EMERGENCY MEDICINE

## 2024-07-25 PROCEDURE — 96365 THER/PROPH/DIAG IV INF INIT: CPT

## 2024-07-25 PROCEDURE — 80053 COMPREHEN METABOLIC PANEL: CPT | Performed by: EMERGENCY MEDICINE

## 2024-07-25 PROCEDURE — 83605 ASSAY OF LACTIC ACID: CPT | Performed by: EMERGENCY MEDICINE

## 2024-07-25 PROCEDURE — 99285 EMERGENCY DEPT VISIT HI MDM: CPT

## 2024-07-25 PROCEDURE — 85025 COMPLETE CBC W/AUTO DIFF WBC: CPT | Performed by: EMERGENCY MEDICINE

## 2024-07-25 PROCEDURE — 84145 PROCALCITONIN (PCT): CPT | Performed by: HOSPITALIST

## 2024-07-25 PROCEDURE — 81001 URINALYSIS AUTO W/SCOPE: CPT | Performed by: EMERGENCY MEDICINE

## 2024-07-25 PROCEDURE — 87077 CULTURE AEROBIC IDENTIFY: CPT | Performed by: EMERGENCY MEDICINE

## 2024-07-25 PROCEDURE — 71045 X-RAY EXAM CHEST 1 VIEW: CPT | Performed by: EMERGENCY MEDICINE

## 2024-07-25 PROCEDURE — 96361 HYDRATE IV INFUSION ADD-ON: CPT

## 2024-07-25 RX ORDER — FLUCONAZOLE 40 MG/ML
200 POWDER, FOR SUSPENSION ORAL EVERY 24 HOURS
Status: DISCONTINUED | OUTPATIENT
Start: 2024-07-25 | End: 2024-07-26

## 2024-07-26 ENCOUNTER — APPOINTMENT (OUTPATIENT)
Dept: CT IMAGING | Facility: HOSPITAL | Age: 54
End: 2024-07-26
Attending: INTERNAL MEDICINE
Payer: MEDICAID

## 2024-07-26 PROBLEM — B37.0 THRUSH, ORAL: Status: ACTIVE | Noted: 2024-07-26

## 2024-07-26 PROBLEM — E87.1 HYPONATREMIA: Status: ACTIVE | Noted: 2024-07-26

## 2024-07-26 PROBLEM — N39.0 URINARY TRACT INFECTION ASSOCIATED WITH CYSTOSTOMY CATHETER, INITIAL ENCOUNTER: Status: ACTIVE | Noted: 2024-07-26
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
## 2024-07-26 PROBLEM — T83.510A URINARY TRACT INFECTION ASSOCIATED WITH CYSTOSTOMY CATHETER, INITIAL ENCOUNTER: Status: ACTIVE | Noted: 2024-07-26

## 2024-07-26 PROBLEM — J18.9 NOSOCOMIAL PNEUMONIA: Status: ACTIVE | Noted: 2024-07-26

## 2024-07-26 PROBLEM — T83.510A URINARY TRACT INFECTION ASSOCIATED WITH CYSTOSTOMY CATHETER, INITIAL ENCOUNTER (HCC): Status: ACTIVE | Noted: 2024-07-26

## 2024-07-26 PROBLEM — N39.0 URINARY TRACT INFECTION ASSOCIATED WITH CYSTOSTOMY CATHETER, INITIAL ENCOUNTER (HCC): Status: ACTIVE | Noted: 2024-07-26

## 2024-07-26 LAB
ANION GAP SERPL CALC-SCNC: 9 MMOL/L (ref 0–18)
BASOPHILS # BLD AUTO: 0.03 X10(3) UL (ref 0–0.2)
BASOPHILS NFR BLD AUTO: 0.3 %
BUN BLD-MCNC: 31 MG/DL (ref 9–23)
C DIFF TOX B STL QL: NEGATIVE
CALCIUM BLD-MCNC: 9.4 MG/DL (ref 8.7–10.4)
CHLORIDE SERPL-SCNC: 106 MMOL/L (ref 98–112)
CO2 SERPL-SCNC: 22 MMOL/L (ref 21–32)
CREAT BLD-MCNC: 1.7 MG/DL
EGFRCR SERPLBLD CKD-EPI 2021: 47 ML/MIN/1.73M2 (ref 60–?)
EOSINOPHIL # BLD AUTO: 0.07 X10(3) UL (ref 0–0.7)
EOSINOPHIL NFR BLD AUTO: 0.7 %
ERYTHROCYTE [DISTWIDTH] IN BLOOD BY AUTOMATED COUNT: 13.4 %
GLUCOSE BLD-MCNC: 100 MG/DL (ref 70–99)
HCT VFR BLD AUTO: 33.7 %
HGB BLD-MCNC: 10.8 G/DL
IMM GRANULOCYTES # BLD AUTO: 0.08 X10(3) UL (ref 0–1)
IMM GRANULOCYTES NFR BLD: 0.8 %
L PNEUMO AG UR QL: NEGATIVE
LYMPHOCYTES # BLD AUTO: 0.81 X10(3) UL (ref 1–4)
LYMPHOCYTES NFR BLD AUTO: 7.7 %
MAGNESIUM SERPL-MCNC: 2 MG/DL (ref 1.6–2.6)
MCH RBC QN AUTO: 29.3 PG (ref 26–34)
MCHC RBC AUTO-ENTMCNC: 32 G/DL (ref 31–37)
MCV RBC AUTO: 91.6 FL
MONOCYTES # BLD AUTO: 1.28 X10(3) UL (ref 0.1–1)
MONOCYTES NFR BLD AUTO: 12.2 %
MRSA DNA SPEC QL NAA+PROBE: POSITIVE
NEUTROPHILS # BLD AUTO: 8.23 X10 (3) UL (ref 1.5–7.7)
NEUTROPHILS # BLD AUTO: 8.23 X10(3) UL (ref 1.5–7.7)
NEUTROPHILS NFR BLD AUTO: 78.3 %
OSMOLALITY SERPL CALC.SUM OF ELEC: 291 MOSM/KG (ref 275–295)
PLATELET # BLD AUTO: 416 10(3)UL (ref 150–450)
POTASSIUM SERPL-SCNC: 4.2 MMOL/L (ref 3.5–5.1)
PROCALCITONIN SERPL-MCNC: 0.36 NG/ML (ref ?–0.05)
RBC # BLD AUTO: 3.68 X10(6)UL
SARS-COV-2 RNA RESP QL NAA+PROBE: NOT DETECTED
SODIUM SERPL-SCNC: 137 MMOL/L (ref 136–145)
STREP PNEUMO ANTIGEN, URINE: NEGATIVE
WBC # BLD AUTO: 10.5 X10(3) UL (ref 4–11)

## 2024-07-26 PROCEDURE — 87449 NOS EACH ORGANISM AG IA: CPT | Performed by: HOSPITALIST

## 2024-07-26 PROCEDURE — 85025 COMPLETE CBC W/AUTO DIFF WBC: CPT | Performed by: INTERNAL MEDICINE

## 2024-07-26 PROCEDURE — 87493 C DIFF AMPLIFIED PROBE: CPT | Performed by: HOSPITALIST

## 2024-07-26 PROCEDURE — 96365 THER/PROPH/DIAG IV INF INIT: CPT

## 2024-07-26 PROCEDURE — 83735 ASSAY OF MAGNESIUM: CPT | Performed by: INTERNAL MEDICINE

## 2024-07-26 PROCEDURE — 96361 HYDRATE IV INFUSION ADD-ON: CPT

## 2024-07-26 PROCEDURE — 80048 BASIC METABOLIC PNL TOTAL CA: CPT | Performed by: INTERNAL MEDICINE

## 2024-07-26 PROCEDURE — 87641 MR-STAPH DNA AMP PROBE: CPT | Performed by: HOSPITALIST

## 2024-07-26 PROCEDURE — 74176 CT ABD & PELVIS W/O CONTRAST: CPT | Performed by: INTERNAL MEDICINE

## 2024-07-26 RX ORDER — SODIUM BICARBONATE 325 MG/1
325 TABLET ORAL AS NEEDED
Status: DISCONTINUED | OUTPATIENT
Start: 2024-07-26 | End: 2024-08-01

## 2024-07-26 RX ORDER — PROCHLORPERAZINE EDISYLATE 5 MG/ML
10 INJECTION INTRAMUSCULAR; INTRAVENOUS EVERY 6 HOURS PRN
Status: DISCONTINUED | OUTPATIENT
Start: 2024-07-26 | End: 2024-07-29

## 2024-07-26 RX ORDER — ASCORBIC ACID 500 MG
500 TABLET ORAL DAILY
Status: DISCONTINUED | OUTPATIENT
Start: 2024-07-26 | End: 2024-08-01

## 2024-07-26 RX ORDER — SODIUM CHLORIDE, SODIUM LACTATE, POTASSIUM CHLORIDE, CALCIUM CHLORIDE 600; 310; 30; 20 MG/100ML; MG/100ML; MG/100ML; MG/100ML
INJECTION, SOLUTION INTRAVENOUS CONTINUOUS
Status: DISCONTINUED | OUTPATIENT
Start: 2024-07-26 | End: 2024-07-29

## 2024-07-26 RX ORDER — FLUCONAZOLE 100 MG/1
200 TABLET ORAL DAILY
Status: DISCONTINUED | OUTPATIENT
Start: 2024-07-26 | End: 2024-08-01 | Stop reason: ALTCHOICE

## 2024-07-26 RX ORDER — SODIUM CHLORIDE 9 MG/ML
INJECTION, SOLUTION INTRAVENOUS CONTINUOUS
Status: DISCONTINUED | OUTPATIENT
Start: 2024-07-26 | End: 2024-07-26

## 2024-07-26 RX ORDER — BUPROPION HYDROCHLORIDE 150 MG/1
100 TABLET ORAL 3 TIMES DAILY
Status: ON HOLD | COMMUNITY
End: 2024-07-26

## 2024-07-26 RX ORDER — SODIUM CHLORIDE 9 MG/ML
INJECTION, SOLUTION INTRAVENOUS CONTINUOUS
Status: ACTIVE | OUTPATIENT
Start: 2024-07-26 | End: 2024-07-26

## 2024-07-26 RX ORDER — BUPROPION HYDROCHLORIDE 100 MG/1
100 TABLET ORAL 3 TIMES DAILY
Status: DISCONTINUED | OUTPATIENT
Start: 2024-07-26 | End: 2024-08-01

## 2024-07-26 RX ORDER — BUPROPION HYDROCHLORIDE 100 MG/1
100 TABLET ORAL 3 TIMES DAILY
COMMUNITY
End: 2024-08-01

## 2024-07-26 RX ORDER — ONDANSETRON 2 MG/ML
4 INJECTION INTRAMUSCULAR; INTRAVENOUS EVERY 6 HOURS PRN
Status: DISCONTINUED | OUTPATIENT
Start: 2024-07-26 | End: 2024-08-01

## 2024-07-26 RX ORDER — BUPROPION HYDROCHLORIDE 100 MG/1
300 TABLET ORAL DAILY
Status: DISCONTINUED | OUTPATIENT
Start: 2024-07-26 | End: 2024-07-26

## 2024-07-26 RX ORDER — SODIUM CHLORIDE 9 MG/ML
INJECTION, SOLUTION INTRAVENOUS CONTINUOUS
Status: DISCONTINUED | OUTPATIENT
Start: 2024-07-26 | End: 2024-07-29

## 2024-07-26 RX ORDER — TERAZOSIN 5 MG/1
10 CAPSULE ORAL NIGHTLY
Status: DISCONTINUED | OUTPATIENT
Start: 2024-07-26 | End: 2024-08-01

## 2024-07-26 RX ORDER — ATORVASTATIN CALCIUM 40 MG/1
40 TABLET, FILM COATED ORAL NIGHTLY
Status: DISCONTINUED | OUTPATIENT
Start: 2024-07-26 | End: 2024-08-01

## 2024-07-26 RX ORDER — ACETAMINOPHEN 160 MG/5ML
1000 SOLUTION ORAL EVERY 6 HOURS PRN
Status: DISCONTINUED | OUTPATIENT
Start: 2024-07-26 | End: 2024-07-26

## 2024-07-26 RX ORDER — FLUCONAZOLE 2 MG/ML
200 INJECTION, SOLUTION INTRAVENOUS EVERY 24 HOURS
Status: DISCONTINUED | OUTPATIENT
Start: 2024-07-26 | End: 2024-07-26

## 2024-07-26 RX ORDER — ACETAMINOPHEN 160 MG/5ML
1000 SOLUTION ORAL EVERY 6 HOURS PRN
Status: DISCONTINUED | OUTPATIENT
Start: 2024-07-26 | End: 2024-08-01

## 2024-07-26 RX ORDER — CARVEDILOL 12.5 MG/1
25 TABLET ORAL 2 TIMES DAILY WITH MEALS
Status: DISCONTINUED | OUTPATIENT
Start: 2024-07-26 | End: 2024-08-01

## 2024-07-26 RX ORDER — AMLODIPINE BESYLATE 5 MG/1
10 TABLET ORAL EVERY EVENING
Status: DISCONTINUED | OUTPATIENT
Start: 2024-07-26 | End: 2024-08-01

## 2024-07-26 RX ORDER — HYDRALAZINE HYDROCHLORIDE 50 MG/1
100 TABLET, FILM COATED ORAL 3 TIMES DAILY
Status: DISCONTINUED | OUTPATIENT
Start: 2024-07-26 | End: 2024-08-01

## 2024-07-26 RX ORDER — ARGININE/ASCORBATE SOD/VITE AC 4.5 G/9.2G
1 POWDER IN PACKET (EA) ORAL 2 TIMES DAILY
Status: DISCONTINUED | OUTPATIENT
Start: 2024-07-26 | End: 2024-07-26 | Stop reason: RX

## 2024-07-26 NOTE — ED INITIAL ASSESSMENT (HPI)
Brought by medic from nursing home  with  complaints cough since few days fever started today patient has urinary catheter in place  and his urine is cloudy . Patient has G -tube in place for  feeding . Not taking orally because of oral thrush . Patient is on antibiotic .

## 2024-07-26 NOTE — PAYOR COMM NOTE
--------------  ADMISSION REVIEW     Payor: [facility]  Subscriber #:  [id number]  Authorization Number: [id number]    Admit date: 7/26/24  Admit time:  2:02 AM        7/25 ED TRIAGE:  Brought by medic from nursing home with complaints cough since few days fever started today patient has urinary catheter in place and his urine is cloudy . Patient has G -tube in place for feeding . Not taking orally because of oral thrush . Patient is on antibiotic .     --- ED REPORT IS NOT DICTATED AT THIS TIME---      History and Physical     History of Present Illness: Patient is a 54 year old male with PMH sig for 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  Has been having a cough over the last few days.  His urine has also been cloudy.  Been suffering from oral thrush.  In the ER Tmax 99.2, initially heart rate was in the 90s and he was tachypneic.  Labs significant for hyponatremia, BUN/creatinine of 28/1.7, leukocytosis and mild anemia and urinalysis with bacteria and leukocytes.  Chest x-ray with right middle lobe opacity.  Started on meropenem and fluconazole and admitted for further evaluation and treatment.     Past Medical History       Past Medical History:    Aphasia    Calculus of kidney    Congenital anomaly of heart (HCC)    Deep vein thrombosis (HCC)    Dysphasia    Esophageal reflux    Essential hypertension    Feeding by G-tube (HCC)    Hemiparesis (HCC)    High blood pressure    Problems with swallowing     feeding tube    Pulmonary embolism (HCC)    Renal disorder    Right hemiplegia (HCC)    Stroke (HCC)    Visual impairment         Past Surgical History       Past Surgical History:   Procedure    Cystourethroscopy     bilateral retrograde pyelograms          OBJECTIVE:  /69 (BP Location: Left arm)   Pulse 85   Temp 98.7 °F (37.1 °C)  (Oral)   Resp 22   Ht 6' 2\" (1.88 m)   Wt 216 lb 0.8 oz (98 kg)   SpO2 94%   BMI 27.74 kg/m²   General:  Alert, no distress, appears stated age. Nonverbal, signals answers   Head:  Normocephalic, without obvious abnormality, atraumatic.   Eyes:  Sclera anicteric, No conjunctival pallor, EOMs intact.    Nose: Nares normal. Septum midline. Mucosa normal. No drainage.   Throat: Lips, mucosa, and tongue normal. Teeth and gums normal.   Neck: Supple, symmetrical, trachea midline, no cervical or supraclavicular lymph adenopathy, thyroid: no enlargment/tenderness/nodules appreciated   Lungs:   Clear to auscultation bilaterally. Normal effort   Chest wall:  No tenderness or deformity.   Heart:  Regular rate and rhythm, S1, S2 normal, no murmur, rub or gallop appreciated   Abdomen:   Soft, non-tender. Bowel sounds normal. No masses,  No organomegaly. Non distended, G-tube, SP cath    Extremities: Extremities normal, atraumatic, no cyanosis or edema.   Skin: Skin color, texture, turgor normal. No rashes or lesions.    Neurologic: Normal strength, R hemiparesis       Lab Results   Component Value Date     WBC 13.1 07/25/2024     HGB 11.7 07/25/2024     HCT 34.8 07/25/2024     .0 07/25/2024     CREATSERUM 1.73 07/25/2024     BUN 28 07/25/2024      07/25/2024     K 4.2 07/25/2024      07/25/2024     CO2 23.0 07/25/2024      07/25/2024     CA 9.2 07/25/2024     ALB 4.1 07/25/2024     ALKPHO 131 07/25/2024     BILT 0.5 07/25/2024     TP 8.1 07/25/2024     AST 28 07/25/2024     ALT 28 07/25/2024     PTT 52.1 07/25/2024     INR 2.31 07/25/2024     PTP 25.6 07/25/2024      XR CHEST AP PORTABLE   Mild increased opacity in the right middle lobe may represent infiltrate versus atelectasis.  Correlate clinically..            Assessment/Plan:       54 year old male with PMH sig for CVA resulting in right-sided weakness, nonverbal status with chronic Kirkland and G-tube, history of MDRO UTI, hypertension,  hyperlipidemia, GERD presented from nursing home with fever.     Sepsis POA 2/2 CAUTI +/- RML PNA  - given hx of MDRO, started on meropenem  - await urine cx  - obtain sputum cx if possible, check urine legionella/strep  - consult ID     Oral thrush  - fluconazole per ID     Acute Renal Injury  - prerenal, infection  - cont isotonic crystalloids     CVA   - resulting in R hemiparesis, nonverbal     Suprapubic cath  - routine cares     G tube  - routine cares     Essential HTN  - coreg, amlodipine, hydralazine     Hyperlipidemia  - statin     FEN: Tfs, PT/OT  Proph: SCDs, heparin      ID:    Reason for consult: Catheter associated UTI     Chief complaint: Fever     History of present illness: Jose Ruvalcaba is a 54 year old male with history of stroke, currently bedbound, on tube feeds and has a suprapubic catheter, who presents here with high fevers, question of pneumonia.     In the emergency room, the patient was afebrile, hemodynamically stable.  He is on room air.  CBC showed leukocytosis white count of 13.1 with a left shift.  BMP showed creatinine 1.73.  LFTs unremarkable.  Urinalysis showed more than 50 white blood cells with 1+ RBCs.  Urine culture and blood cultures are currently pending.  A chest x-ray showed mild increased opacity in the right middle lobe consistent with atelectasis.  The patient has a history of ESBL producing organisms in the past, thus he is currently on meropenem.    Current Hospital Medications[]Expand by Default     [COMPLETED] sodium chloride **FOLLOWED BY** sodium chloride    acetaminophen    amLODIPine    ascorbic acid    atorvastatin    carvedilol    docusate    terazosin    hydrALAZINE    rivaroxaban    fluconazole    meropenem    buPROPion    ondansetron    lactated ringers    dextrose 10%    lipase-protease-amylase (Lip-Prot-Amyl) **AND** sodium bicarbonate         Impression:  Jose Ruvalcaba is a 54 year old male with      Catheter associated UTI, presenting here with  fever and leukocytosis concerning for underlying sepsis with threat to life  Back in 2023, the patient had grown ESBL producing Proteus.  He also grew Enterococcus faecalis  Currently on IV meropenem  Blood cultures urine cultures are currently pending  TOBIN on CKD  With the presence of his UTI, this is concerning for an obstructive uropathy  Would benefit from a CT of the abdomen without contrast to rule out obstruction  Thrush  On IV fluconazole     Recommendations:      Check CT of the abdomen pelvis without contrast  Continue IV meropenem  Continue to follow-up on blood cultures and urine cultures  Change IV fluconazole to down the G-tube  Continue to monitor daily labs for antibiotic toxicity  Further recommendations will depend on the above work-up and clinical progress     MEDICATIONS ADMINISTERED IN LAST 1 DAY:      meropenem (Merrem) 1 g in sodium chloride 0.9% 100 mL IVPB-MBP       Date Action Dose Route User    7/26/2024 0004 New Bag 1 g Intravenous Edward Juarez RN          meropenem (Merrem) 500 mg in sodium chloride 0.9% 100 mL IVPB-MBP       Date Action Dose Route User    7/26/2024 0404 New Bag 500 mg Intravenous Kisha Bennett RN          ondansetron (Zofran) 4 MG/2ML injection 4 mg       Date Action Dose Route User    7/26/2024 0404 Given 4 mg Intravenous Kisha Bennett RN     sodium chloride 0.9% infusion  125/HR      Date Action Dose Route User    7/26/2024 0135 New Bag (none) Intravenous Edward Juarez RN          sodium chloride 0.9% infusion 125/HR      Date Action Dose Route User    7/26/2024 0241 New Bag (none) Intravenous Kisha Bennett RN          sodium chloride 0.9 % IV bolus 500 mL       Date Action Dose Route User    7/26/2024 0040 New Bag 500 mL Intravenous Cheyenne Carter RN            Vitals (last day)       Date/Time Temp Pulse Resp BP SpO2 Weight O2 Device O2 Flow Rate (L/min) [facility]    07/26/24 1121 97.8 °F (36.6 °C) 87 18 140/60 96 % -- None (Room air) -- TJ    07/26/24  0749 -- 85 -- 140/69 94 % -- None (Room air) --     07/26/24 0557 98.7 °F (37.1 °C) 79 22 122/73 94 % -- None (Room air) --     07/26/24 0221 -- -- -- -- -- 216 lb 0.8 oz (98 kg) -- --     07/26/24 0212 98.8 °F (37.1 °C) 93 24 137/83 97 % -- None (Room air) --     07/26/24 0130 -- 90 28 159/81 96 % -- None (Room air) --     07/26/24 0045 -- 92 20 150/81 96 % -- None (Room air) --     07/25/24 2201 99.2 °F (37.3 °C) 94 23 142/76 94 % 216 lb 0.8 oz (98 kg) None (Room air) -- JS

## 2024-07-26 NOTE — ED QUICK NOTES
Orders for admission, patient is aware of plan and ready to go upstairs. Any questions, please call ED RN Edward at extension [phone].     Patient Covid vaccination status: Unvaccinated     COVID Test Ordered in ED: Rapid SARS-CoV-2 by PCR    COVID Suspicion at Admission: N/A    Running Infusions:    sodium chloride          Mental Status/LOC at time of transport: Alert and oriented x 2-3. Patient is able to speak must speak slow and give him time to respond. Brother is at bedside    Other pertinent information:   CIWA score: N/A   NIH score:  N/A

## 2024-07-26 NOTE — PLAN OF CARE
7/26 AM: Pt is A/O x 3-4, soft spoken, hx of stroke R sided weakness. BP and HR are WNL, NSR on tele. BM today, castillo in place, G tube for medication and tube feedings. 2 episodes of emesis, PRN anti nausea medication. Bed bound. Jevity 1.5 goal rate of 70 mL/hr, 170 mL water flush Q4.    Problem: Patient/Family Goals  Goal: Patient/Family Long Term Goal  Description: Patient's Long Term Goal: Go home    Interventions:  - IV Abx  - See additional Care Plan goals for specific interventions  Outcome: Progressing  Goal: Patient/Family Short Term Goal  Description: Patient's Short Term Goal:   7/26 AM: Tube feedings    Interventions:   -   - See additional Care Plan goals for specific interventions  Outcome: Progressing

## 2024-07-26 NOTE — PROGRESS NOTES
NURSING ADMISSION NOTE      Patient admitted via Cart  Oriented to room.  Safety precautions initiated.  Bed in low position.  Call light in reach.    AO x3. Soft spoken. R side flaccid d/t CVA. RA. Tele - NSR. Xarelto. Suprapubic catheter. No pain reported. Bedbound per baseline. G tube - flushed.  Merrem. IVF infusing per MAR. NPO strict. Med rec and Navigator completed with brother at bedside. No further needs at this moment.

## 2024-07-26 NOTE — CONSULTS
Infectious Disease Initial Consultation      Date of admission: 7/25/2024  9:57 PM     Date of service: 07/26/24 11:29 AM    Consult requested by: Hubert Vázquez MD     Reason for consult: Catheter associated UTI    Chief complaint: Fever    History of present illness: Jose Ruvalcaba is a 54 year old male with history of stroke, currently bedbound, on tube feeds and has a suprapubic catheter, who presents here with high fevers, question of pneumonia.    In the emergency room, the patient was afebrile, hemodynamically stable.  He is on room air.  CBC showed leukocytosis white count of 13.1 with a left shift.  BMP showed creatinine 1.73.  LFTs unremarkable.  Urinalysis showed more than 50 white blood cells with 1+ RBCs.  Urine culture and blood cultures are currently pending.  A chest x-ray showed mild increased opacity in the right middle lobe consistent with atelectasis.  The patient has a history of ESBL producing organisms in the past, thus he is currently on meropenem.    Review of systems:  All other components of the review of systems are negative, except those described in the history of present illness.     Past Medical History:    Aphasia    Calculus of kidney    Congenital anomaly of heart (HCC)    Deep vein thrombosis (HCC)    Dysphasia    Esophageal reflux    Essential hypertension    Feeding by G-tube (HCC)    Hemiparesis (HCC)    High blood pressure    Problems with swallowing    feeding tube    Pulmonary embolism (HCC)    Renal disorder    Right hemiplegia (HCC)    Stroke (HCC)    Visual impairment     Past Surgical History:   Procedure Laterality Date    Cystourethroscopy  12/01/2022    bilateral retrograde pyelograms     Social History     Socioeconomic History    Marital status:    Tobacco Use    Smoking status: Never    Smokeless tobacco: Never   Vaping Use    Vaping status: Former    Quit date: 1/7/2020   Substance and Sexual Activity    Alcohol use: Not Currently    Drug use: Never      Social Determinants of Health     Food Insecurity: No Food Insecurity (7/26/2024)    Food Insecurity     Food Insecurity: Never true   Transportation Needs: No Transportation Needs (7/26/2024)    Transportation Needs     Lack of Transportation: No   Housing Stability: Low Risk  (7/26/2024)    Housing Stability     Housing Instability: No     Family History   Problem Relation Age of Onset    Cancer Father      Reviewed, see above    Medications:    [COMPLETED] sodium chloride **FOLLOWED BY** sodium chloride    acetaminophen    amLODIPine    ascorbic acid    atorvastatin    carvedilol    docusate    terazosin    hydrALAZINE    rivaroxaban    fluconazole    meropenem    buPROPion    ondansetron    lactated ringers    dextrose 10%    lipase-protease-amylase (Lip-Prot-Amyl) **AND** sodium bicarbonate     Allergies:  Allergies   Allergen Reactions    Augmentin [Amoxicillin-Pot Clavulanate] UNKNOWN     Per NH records; tolerates cephalosporins    Penicillins RASH     Per fiance at this time. Tolerates cephalosporins       Physical Exam:  Vitals:    07/26/24 1121   BP: 140/60   Pulse: 87   Resp: 18   Temp: 97.8 °F (36.6 °C)     Vitals signs and nursing note reviewed.   Constitutional:       Appearance: Chronically ill appearance.  Aphasic  HENT:      Head: Normocephalic and atraumatic.      Mouth/Throat: Normal dentition     Mouth: Mucous membranes are moist.   Neck:      Musculoskeletal: Neck supple.   Cardiovascular:      Rate and Rhythm: Normal rate.      Heart sounds: Normal heart sounds. No murmur. No friction rub. No gallop.    Pulmonary:      Effort: Pulmonary effort is poor. No respiratory distress.      Breath sounds: Normal breath sounds. No stridor. No wheezing, rhonchi or rales.   Chest:      Chest wall: No tenderness.   Abdominal:      General: Abdomen is flat. There is no distension.  G-tube noted along with the suprapubic catheter.     Palpations: Abdomen is soft. There is no mass.      Tenderness: There  is suprapubic tenderness. There is no guarding or rebound.      Hernia: No hernia is present.   Musculoskeletal:      Right lower leg: No edema.      Left lower leg: No edema.   Skin:     General: Skin is warm and dry.   Neurological:      General: Aphasic, baseline    Laboratory data:  I have independently reviewed all lab results; including old microbiological results.  Lab Results   Component Value Date    WBC 10.5 07/26/2024    HGB 10.8 07/26/2024    HCT 33.7 07/26/2024    .0 07/26/2024    CREATSERUM 1.70 07/26/2024    BUN 31 07/26/2024     07/26/2024    K 4.2 07/26/2024     07/26/2024    CO2 22.0 07/26/2024     07/26/2024    CA 9.4 07/26/2024    ALB 4.1 07/25/2024    ALKPHO 131 07/25/2024    BILT 0.5 07/25/2024    TP 8.1 07/25/2024    AST 28 07/25/2024    ALT 28 07/25/2024    PTT 52.1 07/25/2024    INR 2.31 07/25/2024    MG 2.0 07/26/2024        Recent Labs   Lab 07/26/24  1035   RBC 3.68*   HGB 10.8*   HCT 33.7*   MCV 91.6   MCH 29.3   MCHC 32.0   RDW 13.4   NEPRELIM 8.23*   WBC 10.5   .0       Microbiology data:  No results found for this visit on 07/25/24.      Radiology:  I have reviewed all imaging data available independently.   Chest x-ray: Atelectasis.  No pneumonia    Impression:  Jose Ruvalcaba is a 54 year old male with     Catheter associated UTI, presenting here with fever and leukocytosis concerning for underlying sepsis with threat to life  Back in 2023, the patient had grown ESBL producing Proteus.  He also grew Enterococcus faecalis  Currently on IV meropenem  Blood cultures urine cultures are currently pending  TOBIN on CKD  With the presence of his UTI, this is concerning for an obstructive uropathy  Would benefit from a CT of the abdomen without contrast to rule out obstruction  Thrush  On IV fluconazole    Recommendations:     Check CT of the abdomen pelvis without contrast  Continue IV meropenem  Continue to follow-up on blood cultures and urine  cultures  Change IV fluconazole to down the G-tube  Continue to monitor daily labs for antibiotic toxicity  Further recommendations will depend on the above work-up and clinical progress     The plan of care was discussed with the primary hospital team, Hubert Vázquez MD     Recommendations were also discussed with the patient; all questions were answered.     Thank you for this consultation. Please don't hesitate to call the ID team for questions or any acute changes in patient's clinical condition.    Please note that this report has been produced using speech recognition software and may contain errors related to that system including, but not limited to, errors in grammar, punctuation, and spelling, as well as words and phrases that possibly may have been recognized inappropriately.  If there are any questions or concerns, contact the dictating provider for clarification.    The 21st Century Cures Act makes medical notes like these available to patients in the interest of transparency. Please be advised this is a medical document. Medical documents are intended to carry relevant information, facts as evident, and the clinical opinion of the practitioner. The medical note is intended as peer to peer communication and may appear blunt or direct. It is written in medical language and may contain abbreviations or verbiage that are unfamiliar.     Gaston Puentes MD  [facility]. Tel: [phone]. Fax: [phone].     Jose Ruvalcaba : 1970 MRN: [id number] [facility]: [id number]

## 2024-07-26 NOTE — DIETARY NOTE
[facility]    NUTRITION ASSESSMENT    Pt does not meet malnutrition criteria at this time.    NUTRITION INTERVENTION:  Coordination of Nutrition Care - SLP consult prior to diet advancement.  Enteral Nutrition - via G-tube. Jevity 1.5 at goal rate of 70 ml/hr x 22 hours    Recommend free water flush of 170ml q 4 hours or per MD.  Goal TF to provide:1540ml, 2310kcal, 98 gm protein, 1170 ml free H20, and Total H20 (TF+free water flush)= 2190 ml.     PATIENT STATUS:    [birth year]54 y/o male from [place] admitted with fever and hypotension per ED notes. No H&P yet. Received nutrition consult for G-tube with feedings. Pt on bolus feeds at NH but about infused depended on average PO intake. However, pt has not been taking PO orally due to thrush. TF ordered to provided 100% of nutrition at this time. Will monitor PO intakes and adjust TF's, as needed.     PMH:CVA, aphasia, G-tube      ANTHROPOMETRICS:  Ht:  6' 2\"  Wt: 98 kg (216 lb 0.8 oz).   BMI: Body mass index is 27.74 kg/m².  IBW: 86.4 kg      WEIGHT HISTORY:   Noted no significant wt changes per EMR hx.      Wt Readings from Last 10 Encounters:   07/26/24 98 kg (216 lb 0.8 oz)   02/22/24 98 kg (216 lb 0.8 oz)   05/26/23 98 kg (216 lb)   05/22/23 98 kg (216 lb)   05/13/23 95.3 kg (210 lb)   05/05/23 99.8 kg (220 lb)   04/26/23 100.1 kg (220 lb 10.9 oz)   04/14/23 94 kg (207 lb 3.7 oz)   02/09/23 93 kg (205 lb 0.4 oz)   11/30/22 96.6 kg (213 lb)      NUTRITION:  Diet:       Procedures    NPO     Food Allergies: No  Cultural/Ethnic/Sabianism Preferences Addressed: Yes    Percent Meals Eaten (last 3 days)       None          GI system review: emesis  Last BM: 7/25    Skin and wounds: no pressure ulcers per RN documentation    NUTRITION RELATED PHYSICAL FINDINGS:     1. Body Fat/Muscle Mass: no wasting noted      2. Fluid Accumulation: none per RN documentation    NUTRITION PRESCRIPTION: 78.2kg IBW  Calories: 8938-7407 calories/day ( 25-28  calories per kg)  Protein:   grams protein/day (1.2-1.5 grams protein per kg)  Fluid: ~1 ml/kcal or per MD discretion    NUTRITION DIAGNOSIS/PROBLEM:  Inadequate oral intake related to  decreased ability to consume sufficient energy intakes  PO as evidenced by need for  G-tube TFs.      MONITOR AND EVALUATE/NUTRITION GOALS:  Weight stable within 1 to 2 lbs during admission - New  Tolerate alternative nutrition at 100% of goal - New      MEDICATIONS:  Vit C, IVF;NS@125mL    LABS:  Reviewed    Pt is at Moderate nutrition risk    Marisol Rockwell RD, LDN  Clinical Nutrition  [id number]

## 2024-07-26 NOTE — ED PROVIDER NOTES
Patient Seen in: [facility]      History     Chief Complaint   Patient presents with    Fever     Stated Complaint: Hyponatremia    Subjective:   HPI    54-year-old gentleman with a history of stroke with significant severe right hemiplegia, aphasia, bedbound state who lives in a nursing facility with a G-tube and a history of neurogenic bladder so he has an indwelling suprapubic [place] catheter  Who presents to the emergency department via EMS with concern for fever.  His brother lives who is his power of  and tells us that he has not been himself for almost a week, low energy and not feeling well.  He is currently being treated for thrush and he is also receiving clindamycin and ciprofloxacin but I am not sure what infection is for and neither is his brother  Apparently there is been some concern that he is refusing medications through the G-tube and that perhaps the G-tube needs to be evaluated.  Patient has had recurrent urinary tract infections as well  Objective:   Past Medical History:    Aphasia    Calculus of kidney    Congenital anomaly of heart (HCC)    Deep vein thrombosis (HCC)    Dysphasia    Esophageal reflux    Essential hypertension    Feeding by G-tube (HCC)    Hemiparesis (HCC)    High blood pressure    Problems with swallowing    feeding tube    Pulmonary embolism (HCC)    Renal disorder    Right hemiplegia (HCC)    Stroke (HCC)    Visual impairment              Past Surgical History:   Procedure Laterality Date    Cystourethroscopy  12/01/2022    bilateral retrograde pyelograms                Social History     Socioeconomic History    Marital status:    Tobacco Use    Smoking status: Never    Smokeless tobacco: Never   Vaping Use    Vaping status: Former    Quit date: 1/7/2020   Substance and Sexual Activity    Alcohol use: Not Currently    Drug use: Never     Social Determinants of Health     Food Insecurity: No Food Insecurity (7/26/2024)    Food Insecurity     Food  Insecurity: Never true   Transportation Needs: No Transportation Needs (7/26/2024)    Transportation Needs     Lack of Transportation: No   Housing Stability: Low Risk  (7/26/2024)    Housing Stability     Housing Instability: No              Review of Systems    Positive for stated Chief Complaint: Fever    Other systems are as noted in HPI.  Constitutional and vital signs reviewed.      All other systems reviewed and negative except as noted above.    Physical Exam     ED Triage Vitals [07/25/24 2201]   /76   Pulse 94   Resp 23   Temp 99.2 °F (37.3 °C)   Temp src Temporal   SpO2 94 %   O2 Device None (Room air)       Current Vitals:   Vital Signs  BP: 140/60  Pulse: 87  Resp: 18  Temp: 97.8 °F (36.6 °C)  Temp src: Oral  MAP (mmHg): 80    Oxygen Therapy  SpO2: 96 %  O2 Device: None (Room air)  Pulse Oximetry Type: Continuous  Oximetry Probe Site Changed: No  Pulse Ox Probe Location: Left hand            Physical Exam    Mildly obese -American gentleman lying on the emergency department bed he is 54 years old but he appears older than stated age he has had a stroke he is aphasic but he does not nod yes or shake his head no to direct questions he also shrugs his shoulders when he does not know the answer.  His HEENT exam reveals pupils are equal round reactive to light his oropharynx reveals dense thrush on his tongue and posterior oropharynx his neck is supple his lungs are clear to auscultation bilaterally I do not auscultate any wheezes rales or crackles patient does have at times a congested cough in the room he has a G-tube in his upper abdomen it appears well-seated it is not tender to palpation does not have redness around it and with sterile water and a Lupe syringe I am able to easily flush this tube at multiple times.  Suprapubic catheter does have an area of erythema around it but it does not particularly appear to be infected or cellulitic.  There is some mild tenderness to palpation  suprapubically.  Patient's upper and lower extremities are examined he has contractures of his right arm his bilateral lower legs have poor muscle tone no visible open wounds noted    ED Course     Labs Reviewed   COMP METABOLIC PANEL (14) - Abnormal; Notable for the following components:       Result Value    Glucose 111 (*)     Sodium 134 (*)     BUN 28 (*)     Creatinine 1.73 (*)     eGFR-Cr 46 (*)     Alkaline Phosphatase 131 (*)     Globulin  4.0 (*)     All other components within normal limits   URINALYSIS WITH CULTURE REFLEX - Abnormal; Notable for the following components:    Clarity Urine Ex.Turbid (*)     Ketones Urine Trace (*)     Blood Urine 2+ (*)     Protein Urine 200 (*)     Leukocyte Esterase Urine 500 (*)     WBC Urine >50 (*)     RBC Urine >10 (*)     Bacteria Urine 1+ (*)     Triple PO4 Crystals Few (*)     Yeast Urine Present (*)     WBC Clump Present (*)     All other components within normal limits   PROTHROMBIN TIME (PT) - Abnormal; Notable for the following components:    PT 25.6 (*)     INR 2.31 (*)     All other components within normal limits   PTT, ACTIVATED - Abnormal; Notable for the following components:    PTT 52.1 (*)     All other components within normal limits   BASIC METABOLIC PANEL (8) - Abnormal; Notable for the following components:    Glucose 100 (*)     BUN 31 (*)     Creatinine 1.70 (*)     eGFR-Cr 47 (*)     All other components within normal limits   PROCALCITONIN - Abnormal; Notable for the following components:    Procalcitonin 0.36 (*)     All other components within normal limits   ED/MRSA SCREEN BY PCR-CC - Abnormal; Notable for the following components:    MRSA Screen By PCR Positive (*)     All other components within normal limits    Narrative:     A positive result does not necessarily indicate the presence of viable organisms. For confirmation, epidemiological typing and susceptibility testing, appropriate culture is needed.    PLEASE REFER TO MRSA SCREENING  PROTOCOL FOR TREATMENT.     CBC W/ DIFFERENTIAL - Abnormal; Notable for the following components:    WBC 13.1 (*)     RBC 3.97 (*)     HGB 11.7 (*)     HCT 34.8 (*)     Neutrophil Absolute Prelim 10.48 (*)     Neutrophil Absolute 10.48 (*)     Lymphocyte Absolute 0.89 (*)     Monocyte Absolute 1.55 (*)     All other components within normal limits   CBC W/ DIFFERENTIAL - Abnormal; Notable for the following components:    RBC 3.68 (*)     HGB 10.8 (*)     HCT 33.7 (*)     Neutrophil Absolute Prelim 8.23 (*)     Neutrophil Absolute 8.23 (*)     Lymphocyte Absolute 0.81 (*)     Monocyte Absolute 1.28 (*)     All other components within normal limits   LACTIC ACID, PLASMA - Normal   MAGNESIUM - Normal   RAPID SARS-COV-2 BY PCR - Normal   CBC WITH DIFFERENTIAL WITH PLATELET    Narrative:     The following orders were created for panel order CBC With Differential With Platelet.  Procedure                               Abnormality         Status                     ---------                               -----------         ------                     CBC W/ DIFFERENTIAL[[id number]]          Abnormal            Final result                 Please view results for these tests on the individual orders.   CBC WITH DIFFERENTIAL WITH PLATELET    Narrative:     The following orders were created for panel order CBC With Differential With Platelet.  Procedure                               Abnormality         Status                     ---------                               -----------         ------                     CBC W/ DIFFERENTIAL[[id number]]          Abnormal            Final result                 Please view results for these tests on the individual orders.   STREPTOCOCCUS PNEUMONIAE AG, URINE   LEGIONELLA URINE AG SEROGRP 1   BLOOD CULTURE   BLOOD CULTURE   URINE CULTURE, ROUTINE   C. DIFFICILE(TOXIGENIC)PCR   SPUTUM CULTURE             Chest x-ray is independently interpreted by myself would be consistent with a  pneumonia.  There is an infiltrate present in the right middle lobe which may be aspiration in nature.    Lab review reveals therapeutic INR patient is on warfarin, some mild hyponatremia and elevated creatinine from his baseline which may be secondary to dehydration or possibly associated with the urinary tract infection versus colonized bacterial UTI on the urinalysis patient does have a leukocytosis of 13.1 as well         MDM      54-year-old male presents to the emergency department by EMS for concern for fever from nursing facility mother states he has been unwell and weak.  Patient states he does not feel good he just feels generally unwell and that his mouth hurts from the rash this was cleaned with yes and no questions.    Differential diagnosis includes esophageal candidiasis general candidemia pneumonia, urinary tract infection, infected or inadequately replaced G-tube, ulcerated skin lesion that is infected, opportunistic infection from living in a nursing facility severe dehydration discussed all of these with the patient and his brother workup ensued    Patient does seem to have significant white blood cells and bacteria in his urine I reviewed his urine cultures in June, the patient really had only about 10,000 Enterococcus faecalis colonies but back in May he did have Proteus in his urine about 100,000 colonies and that was sensitive to gentamicin and meropenem.  The urinalysis from that time to this time looks similar so I think it is reasonable to start with that especially as on the patient's chest x-ray he also seems to have a pneumonia and that should give us some coverage of both.  I am going to start aggressive IV fluids as I think the patient has become dehydrated as well.  I discussed this with the patient and his brother they expressed understanding I do think he should be admitted to the hospital for now given that meropenem is IV only and we may need to speak with ID about continuing  marizol.  Cassidy with the hospitalist service.  No signs of severe sepsis lactic acid is flat patient is stable for admission to the hospital life-threatening illness was evaluated and ruled out  Admission disposition: 7/26/2024 12:34 AM                                        Medical Decision Making      Disposition and Plan     Clinical Impression:  1. Nosocomial pneumonia    2. Urinary tract infection associated with cystostomy catheter, initial encounter ([facility])    3. Hyponatremia    4. Thrush, oral         Disposition:  Admit  7/26/2024 12:34 am    Follow-up:  No follow-up provider specified.        Medications Prescribed:  Current Discharge Medication List                            Hospital Problems       Present on Admission  Date Reviewed: 9/8/2022            ICD-10-CM Noted POA    * (Principal) Nosocomial pneumonia J18.9, Y95 7/26/2024 Unknown    Hyponatremia E87.1 7/26/2024 Unknown    Thrush, oral B37.0 7/26/2024 Unknown    Urinary tract infection associated with cystostomy catheter, initial encounter ([facility]) T83.510A, N39.0 7/26/2024 Unknown

## 2024-07-26 NOTE — H&P
[facility] History and Physical      Chief Complaint   Patient presents with    Fever        PCP: Rick Lawson MD      History of Present Illness: Patient is a 54 year old male with PMH sig for 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  Has been having a cough over the last few days.  His urine has also been cloudy.  Been suffering from oral thrush.  In the ER Tmax 99.2, initially heart rate was in the 90s and he was tachypneic.  Labs significant for hyponatremia, BUN/creatinine of 28/1.7, leukocytosis and mild anemia and urinalysis with bacteria and leukocytes.  Chest x-ray with right middle lobe opacity.  Started on meropenem and fluconazole and admitted for further evaluation and treatment.    Past Medical History:    Aphasia    Calculus of kidney    Congenital anomaly of heart (HCC)    Deep vein thrombosis (HCC)    Dysphasia    Esophageal reflux    Essential hypertension    Feeding by G-tube (HCC)    Hemiparesis (HCC)    High blood pressure    Problems with swallowing    feeding tube    Pulmonary embolism (HCC)    Renal disorder    Right hemiplegia (HCC)    Stroke (HCC)    Visual impairment      Past Surgical History:   Procedure Laterality Date    Cystourethroscopy  12/01/2022    bilateral retrograde pyelograms        ALL:  Allergies   Allergen Reactions    Augmentin [Amoxicillin-Pot Clavulanate] UNKNOWN     Per NH records; tolerates cephalosporins    Penicillins RASH     Per fiance at this time. Tolerates cephalosporins        No current outpatient medications on file.       Social History     Tobacco Use    Smoking status: Never    Smokeless tobacco: Never   Substance Use Topics    Alcohol use: Not Currently        Fam Hx  Family History   Problem Relation Age of Onset    Cancer Father        Review of Systems  Comprehensive ROS reviewed and negative except for  what is stated in HPI.      OBJECTIVE:  /69 (BP Location: Left arm)   Pulse 85   Temp 98.7 °F (37.1 °C) (Oral)   Resp 22   Ht 6' 2\" (1.88 m)   Wt 216 lb 0.8 oz (98 kg)   SpO2 94%   BMI 27.74 kg/m²   General:  Alert, no distress, appears stated age. Nonverbal, signals answers   Head:  Normocephalic, without obvious abnormality, atraumatic.   Eyes:  Sclera anicteric, No conjunctival pallor, EOMs intact.    Nose: Nares normal. Septum midline. Mucosa normal. No drainage.   Throat: Lips, mucosa, and tongue normal. Teeth and gums normal.   Neck: Supple, symmetrical, trachea midline, no cervical or supraclavicular lymph adenopathy, thyroid: no enlargment/tenderness/nodules appreciated   Lungs:   Clear to auscultation bilaterally. Normal effort   Chest wall:  No tenderness or deformity.   Heart:  Regular rate and rhythm, S1, S2 normal, no murmur, rub or gallop appreciated   Abdomen:   Soft, non-tender. Bowel sounds normal. No masses,  No organomegaly. Non distended, G-tube, SP cath    Extremities: Extremities normal, atraumatic, no cyanosis or edema.   Skin: Skin color, texture, turgor normal. No rashes or lesions.    Neurologic: Normal strength, R hemiparesis      Data Review:    LABS:   Lab Results   Component Value Date    WBC 13.1 07/25/2024    HGB 11.7 07/25/2024    HCT 34.8 07/25/2024    .0 07/25/2024    CREATSERUM 1.73 07/25/2024    BUN 28 07/25/2024     07/25/2024    K 4.2 07/25/2024     07/25/2024    CO2 23.0 07/25/2024     07/25/2024    CA 9.2 07/25/2024    ALB 4.1 07/25/2024    ALKPHO 131 07/25/2024    BILT 0.5 07/25/2024    TP 8.1 07/25/2024    AST 28 07/25/2024    ALT 28 07/25/2024    PTT 52.1 07/25/2024    INR 2.31 07/25/2024    PTP 25.6 07/25/2024       CXR: All imaging personally reviewed.      Radiology: XR CHEST AP PORTABLE  (CPT=71045)    Result Date: 7/25/2024  PROCEDURE:  XR CHEST AP PORTABLE  (CPT=71045)  TECHNIQUE:  AP chest radiograph was obtained.  COMPARISON:   [facility] , XR, XR CHEST AP PORTABLE  (CPT=71045), 5/13/2023, 6:39 AM.  INDICATIONS:  coughing,fever, g tube dependant  PATIENT STATED HISTORY: (As transcribed by Technologist)  Patient offered no additional history at this time.   FINDINGS:  The cardiomediastinal silhouette is within normal limits.  Mild increased interstitial opacities noted in the region of the right middle lobe.  There is elevation of the right hemidiaphragm.  No effusion or pneumothorax.  No aggressive osseous  lesions.            CONCLUSION:  Mild increased opacity in the right middle lobe may represent infiltrate versus atelectasis.  Correlate clinically..   LOCATION:  [facility]      Dictated by (CST): Sarmad Washington MD on 7/25/2024 at 11:13 PM     Finalized by (CST): Sarmad Washington MD on 7/25/2024 at 11:14 PM          Assessment/Plan:      54 year old male with PMH sig for 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    Sepsis POA 2/2 CAUTI +/- RML PNA  - given hx of MDRO, started on meropenem  - await urine cx  - obtain sputum cx if possible, check urine legionella/strep  - consult ID    Oral thrush  - fluconazole per ID    Acute Renal Injury  - prerenal, infection  - cont isotonic crystalloids    CVA   - resulting in R hemiparesis, nonverbal    Suprapubic cath  - routine cares    G tube  - routine cares    Essential HTN  - coreg, amlodipine, hydralazine    Hyperlipidemia  - statin    FEN: Tfs, PT/OT  Proph: SCDs, heparin    Outpatient records or previous hospital records reviewed.   [facility] hospitalist to continue to follow patient while in house      Fanny Mcneil MD  [facility]  Hospitalist  Message over [employer]  Pager: [phone]

## 2024-07-26 NOTE — CM/SW NOTE
07/26/24 1000   CM/SW Referral Data   Referral Source Social Work (self-referral)   Reason for Referral Discharge planning   Informant EMR;Clinical Staff Member   Patient Info   Patient's Home Environment Long Term Care Facility   Post Acute Care Provider Upon Admission Ashlie Iverson   Discharge Needs   Anticipated D/C needs Transportation services;To be determined;Long term care facility     Patient is a 53 y/o male who admitted with Nosocomial pneumonia, Urinary tract infection associated with cystostomy catheter. Per chart review, patient is a LTC resident at [facility]. Sent return referral in aidin.    CANDELARIA/CM to remain available for support and/or discharge planning.    Vero Webb FRANCOISE  Discharge Planner  [phone]

## 2024-07-27 ENCOUNTER — ANESTHESIA (OUTPATIENT)
Dept: SURGERY | Facility: HOSPITAL | Age: 54
End: 2024-07-27
Payer: MEDICAID

## 2024-07-27 ENCOUNTER — ANESTHESIA EVENT (OUTPATIENT)
Dept: SURGERY | Facility: HOSPITAL | Age: 54
End: 2024-07-27
Payer: MEDICAID

## 2024-07-27 ENCOUNTER — APPOINTMENT (OUTPATIENT)
Dept: GENERAL RADIOLOGY | Facility: HOSPITAL | Age: 54
End: 2024-07-27
Attending: UROLOGY
Payer: MEDICAID

## 2024-07-27 LAB
ALBUMIN SERPL-MCNC: 3.8 G/DL (ref 3.2–4.8)
ALBUMIN/GLOB SERPL: 1.1 {RATIO} (ref 1–2)
ALP LIVER SERPL-CCNC: 139 U/L
ALT SERPL-CCNC: 37 U/L
ANION GAP SERPL CALC-SCNC: 7 MMOL/L (ref 0–18)
ANION GAP SERPL CALC-SCNC: 7 MMOL/L (ref 0–18)
AST SERPL-CCNC: 42 U/L (ref ?–34)
BASOPHILS # BLD AUTO: 0.03 X10(3) UL (ref 0–0.2)
BASOPHILS NFR BLD AUTO: 0.4 %
BILIRUB SERPL-MCNC: 0.3 MG/DL (ref 0.3–1.2)
BUN BLD-MCNC: 30 MG/DL (ref 9–23)
BUN BLD-MCNC: 30 MG/DL (ref 9–23)
CALCIUM BLD-MCNC: 9 MG/DL (ref 8.7–10.4)
CALCIUM BLD-MCNC: 9 MG/DL (ref 8.7–10.4)
CHLORIDE SERPL-SCNC: 109 MMOL/L (ref 98–112)
CHLORIDE SERPL-SCNC: 109 MMOL/L (ref 98–112)
CO2 SERPL-SCNC: 24 MMOL/L (ref 21–32)
CO2 SERPL-SCNC: 24 MMOL/L (ref 21–32)
CREAT BLD-MCNC: 1.52 MG/DL
CREAT BLD-MCNC: 1.52 MG/DL
EGFRCR SERPLBLD CKD-EPI 2021: 53 ML/MIN/1.73M2 (ref 60–?)
EGFRCR SERPLBLD CKD-EPI 2021: 53 ML/MIN/1.73M2 (ref 60–?)
EOSINOPHIL # BLD AUTO: 0.24 X10(3) UL (ref 0–0.7)
EOSINOPHIL NFR BLD AUTO: 3.1 %
ERYTHROCYTE [DISTWIDTH] IN BLOOD BY AUTOMATED COUNT: 13.8 %
GLOBULIN PLAS-MCNC: 3.5 G/DL (ref 2–3.5)
GLUCOSE BLD-MCNC: 104 MG/DL (ref 70–99)
GLUCOSE BLD-MCNC: 113 MG/DL (ref 70–99)
GLUCOSE BLD-MCNC: 115 MG/DL (ref 70–99)
GLUCOSE BLD-MCNC: 116 MG/DL (ref 70–99)
GLUCOSE BLD-MCNC: 116 MG/DL (ref 70–99)
GLUCOSE BLD-MCNC: 127 MG/DL (ref 70–99)
HCT VFR BLD AUTO: 31.2 %
HGB BLD-MCNC: 10.2 G/DL
IMM GRANULOCYTES # BLD AUTO: 0.04 X10(3) UL (ref 0–1)
IMM GRANULOCYTES NFR BLD: 0.5 %
LYMPHOCYTES # BLD AUTO: 0.79 X10(3) UL (ref 1–4)
LYMPHOCYTES NFR BLD AUTO: 10.3 %
MAGNESIUM SERPL-MCNC: 2.2 MG/DL (ref 1.6–2.6)
MCH RBC QN AUTO: 29.5 PG (ref 26–34)
MCHC RBC AUTO-ENTMCNC: 32.7 G/DL (ref 31–37)
MCV RBC AUTO: 90.2 FL
MONOCYTES # BLD AUTO: 0.82 X10(3) UL (ref 0.1–1)
MONOCYTES NFR BLD AUTO: 10.7 %
NEUTROPHILS # BLD AUTO: 5.75 X10 (3) UL (ref 1.5–7.7)
NEUTROPHILS # BLD AUTO: 5.75 X10(3) UL (ref 1.5–7.7)
NEUTROPHILS NFR BLD AUTO: 75 %
OSMOLALITY SERPL CALC.SUM OF ELEC: 297 MOSM/KG (ref 275–295)
OSMOLALITY SERPL CALC.SUM OF ELEC: 297 MOSM/KG (ref 275–295)
PLATELET # BLD AUTO: 398 10(3)UL (ref 150–450)
POTASSIUM SERPL-SCNC: 3.5 MMOL/L (ref 3.5–5.1)
POTASSIUM SERPL-SCNC: 3.5 MMOL/L (ref 3.5–5.1)
PROT SERPL-MCNC: 7.3 G/DL (ref 5.7–8.2)
RBC # BLD AUTO: 3.46 X10(6)UL
SODIUM SERPL-SCNC: 140 MMOL/L (ref 136–145)
SODIUM SERPL-SCNC: 140 MMOL/L (ref 136–145)
WBC # BLD AUTO: 7.7 X10(3) UL (ref 4–11)

## 2024-07-27 PROCEDURE — 85025 COMPLETE CBC W/AUTO DIFF WBC: CPT | Performed by: INTERNAL MEDICINE

## 2024-07-27 PROCEDURE — 82962 GLUCOSE BLOOD TEST: CPT

## 2024-07-27 PROCEDURE — 0T768DZ DILATION OF RIGHT URETER WITH INTRALUMINAL DEVICE, VIA NATURAL OR ARTIFICIAL OPENING ENDOSCOPIC: ICD-10-PCS | Performed by: UROLOGY

## 2024-07-27 PROCEDURE — 83735 ASSAY OF MAGNESIUM: CPT | Performed by: INTERNAL MEDICINE

## 2024-07-27 PROCEDURE — 80053 COMPREHEN METABOLIC PANEL: CPT | Performed by: INTERNAL MEDICINE

## 2024-07-27 PROCEDURE — BT1D1ZZ FLUOROSCOPY OF RIGHT KIDNEY, URETER AND BLADDER USING LOW OSMOLAR CONTRAST: ICD-10-PCS | Performed by: UROLOGY

## 2024-07-27 DEVICE — URETERAL STENT
Type: IMPLANTABLE DEVICE | Site: URETER | Status: FUNCTIONAL
Brand: ASCERTA™

## 2024-07-27 RX ORDER — HYDROMORPHONE HYDROCHLORIDE 1 MG/ML
0.6 INJECTION, SOLUTION INTRAMUSCULAR; INTRAVENOUS; SUBCUTANEOUS EVERY 5 MIN PRN
Status: DISCONTINUED | OUTPATIENT
Start: 2024-07-27 | End: 2024-07-27 | Stop reason: HOSPADM

## 2024-07-27 RX ORDER — ONDANSETRON 2 MG/ML
4 INJECTION INTRAMUSCULAR; INTRAVENOUS EVERY 6 HOURS PRN
Status: DISCONTINUED | OUTPATIENT
Start: 2024-07-27 | End: 2024-07-27 | Stop reason: HOSPADM

## 2024-07-27 RX ORDER — ACETAMINOPHEN 500 MG
1000 TABLET ORAL ONCE AS NEEDED
Status: DISCONTINUED | OUTPATIENT
Start: 2024-07-27 | End: 2024-07-27 | Stop reason: HOSPADM

## 2024-07-27 RX ORDER — ROCURONIUM BROMIDE 10 MG/ML
INJECTION, SOLUTION INTRAVENOUS AS NEEDED
Status: DISCONTINUED | OUTPATIENT
Start: 2024-07-27 | End: 2024-07-27 | Stop reason: SURG

## 2024-07-27 RX ORDER — DEXAMETHASONE SODIUM PHOSPHATE 4 MG/ML
VIAL (ML) INJECTION AS NEEDED
Status: DISCONTINUED | OUTPATIENT
Start: 2024-07-27 | End: 2024-07-27 | Stop reason: SURG

## 2024-07-27 RX ORDER — LIDOCAINE HYDROCHLORIDE 10 MG/ML
INJECTION, SOLUTION EPIDURAL; INFILTRATION; INTRACAUDAL; PERINEURAL AS NEEDED
Status: DISCONTINUED | OUTPATIENT
Start: 2024-07-27 | End: 2024-07-27 | Stop reason: SURG

## 2024-07-27 RX ORDER — HYDROMORPHONE HYDROCHLORIDE 1 MG/ML
0.2 INJECTION, SOLUTION INTRAMUSCULAR; INTRAVENOUS; SUBCUTANEOUS EVERY 5 MIN PRN
Status: DISCONTINUED | OUTPATIENT
Start: 2024-07-27 | End: 2024-07-27 | Stop reason: HOSPADM

## 2024-07-27 RX ORDER — ONDANSETRON 2 MG/ML
INJECTION INTRAMUSCULAR; INTRAVENOUS AS NEEDED
Status: DISCONTINUED | OUTPATIENT
Start: 2024-07-27 | End: 2024-07-27 | Stop reason: SURG

## 2024-07-27 RX ORDER — HYDROCODONE BITARTRATE AND ACETAMINOPHEN 5; 325 MG/1; MG/1
2 TABLET ORAL ONCE AS NEEDED
Status: DISCONTINUED | OUTPATIENT
Start: 2024-07-27 | End: 2024-07-27 | Stop reason: HOSPADM

## 2024-07-27 RX ORDER — HYDROCODONE BITARTRATE AND ACETAMINOPHEN 5; 325 MG/1; MG/1
1 TABLET ORAL ONCE AS NEEDED
Status: DISCONTINUED | OUTPATIENT
Start: 2024-07-27 | End: 2024-07-27 | Stop reason: HOSPADM

## 2024-07-27 RX ORDER — NALOXONE HYDROCHLORIDE 0.4 MG/ML
0.08 INJECTION, SOLUTION INTRAMUSCULAR; INTRAVENOUS; SUBCUTANEOUS AS NEEDED
Status: DISCONTINUED | OUTPATIENT
Start: 2024-07-27 | End: 2024-07-27 | Stop reason: HOSPADM

## 2024-07-27 RX ORDER — PROCHLORPERAZINE EDISYLATE 5 MG/ML
5 INJECTION INTRAMUSCULAR; INTRAVENOUS EVERY 8 HOURS PRN
Status: DISCONTINUED | OUTPATIENT
Start: 2024-07-27 | End: 2024-07-27 | Stop reason: HOSPADM

## 2024-07-27 RX ORDER — SODIUM CHLORIDE, SODIUM LACTATE, POTASSIUM CHLORIDE, CALCIUM CHLORIDE 600; 310; 30; 20 MG/100ML; MG/100ML; MG/100ML; MG/100ML
INJECTION, SOLUTION INTRAVENOUS CONTINUOUS
Status: DISCONTINUED | OUTPATIENT
Start: 2024-07-27 | End: 2024-07-27 | Stop reason: HOSPADM

## 2024-07-27 RX ORDER — HYDROMORPHONE HYDROCHLORIDE 1 MG/ML
0.4 INJECTION, SOLUTION INTRAMUSCULAR; INTRAVENOUS; SUBCUTANEOUS EVERY 5 MIN PRN
Status: DISCONTINUED | OUTPATIENT
Start: 2024-07-27 | End: 2024-07-27 | Stop reason: HOSPADM

## 2024-07-27 RX ADMIN — LIDOCAINE HYDROCHLORIDE 50 MG: 10 INJECTION, SOLUTION EPIDURAL; INFILTRATION; INTRACAUDAL; PERINEURAL at 17:32:00

## 2024-07-27 RX ADMIN — ROCURONIUM BROMIDE 50 MG: 10 INJECTION, SOLUTION INTRAVENOUS at 17:32:00

## 2024-07-27 RX ADMIN — SODIUM CHLORIDE, SODIUM LACTATE, POTASSIUM CHLORIDE, CALCIUM CHLORIDE: 600; 310; 30; 20 INJECTION, SOLUTION INTRAVENOUS at 17:24:00

## 2024-07-27 RX ADMIN — DEXAMETHASONE SODIUM PHOSPHATE 4 MG: 4 MG/ML VIAL (ML) INJECTION at 17:49:00

## 2024-07-27 RX ADMIN — ONDANSETRON 4 MG: 2 INJECTION INTRAMUSCULAR; INTRAVENOUS at 17:49:00

## 2024-07-27 NOTE — PLAN OF CARE
Patient AAOx1-2, alert. Slow/soft speech. Glasses. Room air. Tele NSR. Suprapubic cath. G tube. Zofran given for nausea. Fluconazole for oral thrush. Scarring to bottom. Max assist. Right sided weakness. IVF. Patient rounded on routinely. Patient updated on plan of care.    Messaged urology regarding plans and question for yasir, MD stated to make patient NPO and he will see him. Clarified for meds/tube feeds, stated ok for meds but hold tube feeds. AM meds given except xarelto. TF stopped at 0955 when MD stated to hold them.    Urology came to floor, spoke to RN, stated they are not going to do anything inpatient, stated they will schedule him outpatient. MD stated can give xarelto and resume tube feeds.    Hospitalist messaged regarding this. MD responded back that ID spoke to urology and they are now going to schedule him this admission. Clarified with hospitalist, stated as of now 1100 continue holding xarelto and TF.    Attempted to call patient's POA/brother amado at [phone] x2 for consent, no answer. Left a message for him to call back. Brother called back- consent obtained and placed on chart.    Hospitalist messaged regarding vomiting and plan for tube feeds after procedure, said ok to resume tube feeds at a lower rate. PRN robitussin also ordered for cough.      Problem: Patient/Family Goals  Goal: Patient/Family Long Term Goal  Description: Patient's Long Term Goal: Go home    Interventions:  - IV Abx  - See additional Care Plan goals for specific interventions  Outcome: Progressing  Goal: Patient/Family Short Term Goal  Description: Patient's Short Term Goal:   7/26 AM: Tube feedings  07/26/2024 - control nausea    Interventions:   - monitor gastric residuals  - compazine and Zofran as ordered.   -   - See additional Care Plan goals for specific interventions  Outcome: Progressing     Problem: RESPIRATORY - ADULT  Goal: Achieves optimal ventilation and oxygenation  Description: INTERVENTIONS:  - Assess  for changes in respiratory status  - Assess for changes in mentation and behavior  - Position to facilitate oxygenation and minimize respiratory effort  - Oxygen supplementation based on oxygen saturation or ABGs  - Provide Smoking Cessation handout, if applicable  - Encourage broncho-pulmonary hygiene including cough, deep breathe, Incentive Spirometry  - Assess the need for suctioning and perform as needed  - Assess and instruct to report SOB or any respiratory difficulty  - Respiratory Therapy support as indicated  - Manage/alleviate anxiety  - Monitor for signs/symptoms of CO2 retention  Outcome: Progressing     Problem: GASTROINTESTINAL - ADULT  Goal: Minimal or absence of nausea and vomiting  Description: INTERVENTIONS:  - Maintain adequate hydration with IV or PO as ordered and tolerated  - Nasogastric tube to low intermittent suction as ordered  - Evaluate effectiveness of ordered antiemetic medications  - Provide nonpharmacologic comfort measures as appropriate  - Advance diet as tolerated, if ordered  - Obtain nutritional consult as needed  - Evaluate fluid balance  Outcome: Progressing  Goal: Maintains adequate nutritional intake (undernourished)  Description: INTERVENTIONS:  - Monitor percentage of each meal consumed  - Identify factors contributing to decreased intake, treat as appropriate  - Assist with meals as needed  - Monitor I&O, WT and lab values  - Obtain nutritional consult as needed  - Optimize oral hygiene and moisture  - Encourage food from home; allow for food preferences  - Enhance eating environment  Outcome: Progressing     Problem: GENITOURINARY - ADULT  Goal: Absence of urinary retention  Description: INTERVENTIONS:  - Assess patient’s ability to void and empty bladder  - Monitor intake/output and perform bladder scan as needed  - Follow urinary retention protocol/standard of care  - Consider collaborating with pharmacy to review patient's medication profile  - Implement strategies to  promote bladder emptying  Outcome: Progressing     Problem: METABOLIC/FLUID AND ELECTROLYTES - ADULT  Goal: Glucose maintained within prescribed range  Description: INTERVENTIONS:  - Monitor Blood Glucose as ordered  - Assess for signs and symptoms of hyperglycemia and hypoglycemia  - Administer ordered medications to maintain glucose within target range  - Assess barriers to adequate nutritional intake and initiate nutrition consult as needed  - Instruct patient on self management of diabetes  Outcome: Progressing     Problem: SKIN/TISSUE INTEGRITY - ADULT  Goal: Skin integrity remains intact  Description: INTERVENTIONS  - Assess and document risk factors for pressure ulcer development  - Assess and document skin integrity  - Monitor for areas of redness and/or skin breakdown  - Initiate interventions, skin care algorithm/standards of care as needed  Outcome: Progressing

## 2024-07-27 NOTE — PROGRESS NOTES
[facility] PROGRESS NOTE     Jose Ruvalcaba Patient Status:  Inpatient    1970 MRN [id number]   [facility] 5NW-A Attending Rick Mcneil*   Hosp Day # 1 PCP Rick Lawson MD     Subjective:  Jose Ruvalcaba is a(n) 54 year old male,   Objective:  Blood pressure 138/61, pulse 67, temperature 97.7 °F (36.5 °C), temperature source Oral, resp. rate 21, height 6' 2\" (1.88 m), weight 216 lb 0.8 oz (98 kg), SpO2 96%.  General appearance: alert, appears stated age and cooperative  Eyes: sclera non-icteric, no redness   Mouth:  moist oral mucosa, no lesions  Lungs: Unlabored respirations  Abdomen: Soft, non-tender, not distended, bowel sounds present    nO CVAT       Lab Results   Component Value Date    WBC 7.7 2024    HGB 10.2 2024    HCT 31.2 2024    .0 2024    CREATSERUM 1.52 2024    CREATSERUM 1.52 2024    BUN 30 2024    BUN 30 2024     2024     2024    K 3.5 2024    K 3.5 2024     2024     2024    CO2 24.0 2024    CO2 24.0 2024     2024     2024    CA 9.0 2024    CA 9.0 2024    ALB 3.8 2024    ALKPHO 139 2024    BILT 0.3 2024    TP 7.3 2024    AST 42 2024    ALT 37 2024    MG 2.2 2024    PGLU 115 2024        No results found for: \"PSA\"      Intake/Output Summary (Last 24 hours) at 2024 1305  Last data filed at 2024 0900  Gross per 24 hour   Intake 2704 ml   Output 1050 ml   Net 1654 ml         Assessment and Plan:        Neurogenic Bladder   SPT  - change q  4 weeks   FU  Dr Castelan for SPT  change      2. CAUTI/  MDR  in past      Cont IV anti BX - Sulema     U CX - proteus      3.    LLP  8 mm stone  K  4.    RUP   3 mm stone     5.  R distal ureter  10 mm stone with  Hydro     NO pain , NO WBC , NO CVAT - completely asymp     Need to await negative U  CX   prior to URS -- can be done as outpt in  2 weeks with Dr Castelan      Plan for OR today for JJ stent     All risks, benefits and alternatives to surgery were discusssed in detail with life partner Hailey Mckenna . Complications such as urosepsis, bleeding, infection, hematuria, urinary retention, clot retention, and the rare risk of urethral, bladder and/ or ureteral injury were detailed to the patient. The Life partner was given ample time to ask questions. All questions were answered.  After weighing the risks, benefits and alternatives,   Hailey Mckenna elects to proceed with surgery as planned.             6.  TOBIN          Thank you for allowing me to participate in the care of your patient.    Please give me a call on my cell if you have any questions of concerns.     Oral Nation MD   [facility]  Cell: [phone]  Office :[phone]    1:05 PM

## 2024-07-27 NOTE — PROGRESS NOTES
Patient has as a Gtube. Jevity 1.5 was started today  at 10cc/hour (curently at 20cc/hour). No residual but he had been constantly complaining of nausea with some vomiting during the day.   He also has a kidney stone (per CT) that could be causing the nausea (?). He denies pain.    He had been getting Zofran and Compazine. Last dose of Zofran was 1953.  He is still complaining of nausea and having some clear spit up.  No actual vomiting.  Page sent to Dr Carrillo to clarify if feedings should continue or be held for now.  Will continue to monitor and hold feeding if patient vomits again as ordered. Suction was set up and patient was provided with a blade with instruction on its use to minimize risk of aspiration with emesis.

## 2024-07-27 NOTE — PROGRESS NOTES
Infectious Disease Progress Note      Date of admission: 7/25/2024  9:57 PM     Reason for consult: Catheter associated UTI, pyelonephritis    Subjective: Doing about the same.  No abdominal pain.  No nausea or vomiting.  No diarrhea.    The rest of the systems were reviewed and found to be negative except was mentioned above    Interval events: This is a 54-year-old male patient with history of stroke, currently bedbound with a suprapubic catheter, presents here with high fevers in the setting of a catheter associated UTI.  Patient had grown ESBL producing organisms along with Enterococcus faecalis in the past.  Currently on IV meropenem  CT now showing obstructing right-sided nephrolithiasis    Medications:    potassium chloride    [COMPLETED] sodium chloride **FOLLOWED BY** sodium chloride    acetaminophen    amLODIPine    ascorbic acid    atorvastatin    carvedilol    docusate    terazosin    hydrALAZINE    rivaroxaban    meropenem    buPROPion    ondansetron    lactated ringers    dextrose 10%    lipase-protease-amylase (Lip-Prot-Amyl) **AND** sodium bicarbonate    fluconazole    prochlorperazine     Allergies:  Allergies   Allergen Reactions    Augmentin [Amoxicillin-Pot Clavulanate] UNKNOWN     Per NH records; tolerates cephalosporins    Penicillins RASH     Per fiance at this time. Tolerates cephalosporins       Physical Exam:  Vitals:    07/27/24 0704   BP: 131/59   Pulse: 74   Resp: 17   Temp: 98.1 °F (36.7 °C)     Vitals signs and nursing note reviewed.   Constitutional:       Appearance: Normal appearance.   HENT:      Head: Normocephalic and atraumatic.      Mouth: Mucous membranes are moist.   Neck:      Musculoskeletal: Neck supple.   Cardiovascular:      Rate and Rhythm: Normal rate.      Heart sounds: Normal heart sounds. No murmur. No friction rub. No gallop.    Pulmonary:      Effort: Pulmonary effort is normal. No respiratory distress.      Breath sounds: Normal breath sounds. No stridor. No  wheezing, rhonchi or rales.   Chest:      Chest wall: No tenderness.   Abdominal:      General: Abdomen is flat. There is no distension.      Palpations: Abdomen is soft. There is no mass.      Tenderness: There is no tenderness. There is no guarding or rebound.      Hernia: No hernia is present.   Musculoskeletal:      Right lower leg: No edema.      Left lower leg: No edema.   Skin:     General: Skin is warm and dry.     Laboratory data:  I have reviewed all the lab results independently.  Lab Results   Component Value Date    WBC 7.7 07/27/2024    HGB 10.2 07/27/2024    HCT 31.2 07/27/2024    .0 07/27/2024    CREATSERUM 1.52 07/27/2024    CREATSERUM 1.52 07/27/2024    BUN 30 07/27/2024    BUN 30 07/27/2024     07/27/2024     07/27/2024    K 3.5 07/27/2024    K 3.5 07/27/2024     07/27/2024     07/27/2024    CO2 24.0 07/27/2024    CO2 24.0 07/27/2024     07/27/2024     07/27/2024    CA 9.0 07/27/2024    CA 9.0 07/27/2024    ALB 3.8 07/27/2024    ALKPHO 139 07/27/2024    BILT 0.3 07/27/2024    TP 7.3 07/27/2024    AST 42 07/27/2024    ALT 37 07/27/2024    MG 2.2 07/27/2024      Recent Labs   Lab 07/27/24  0902   RBC 3.46*   HGB 10.2*   HCT 31.2*   MCV 90.2   MCH 29.5   MCHC 32.7   RDW 13.8   NEPRELIM 5.75   WBC 7.7   .0      Microbiology data:  Hospital Encounter on 07/25/24   1. Blood Culture     Status: None (Preliminary result)    Collection Time: 07/25/24 10:34 PM    Specimen: Blood,peripheral   Result Value Ref Range    Blood Culture Result No Growth 1 Day N/A   2. Urine Culture, Routine     Status: Abnormal (Preliminary result)    Collection Time: 07/25/24 10:33 PM    Specimen: Urine, clean catch   Result Value Ref Range    Urine Culture 10,000 - 50,000 CFU/ML Proteus mirabilis (A) N/A        Radiology:  I have reviewed all imagining data available independently.   CT abdomen pelvis:  Moderate hydronephrosis on the right side with a component of cystitis and  right-sided ureteritis and pyelonephritis    Impression:  Jose Ruvalcaba is a 54 year old male with    Acute complicated right-sided pyelonephritis in the setting of obstructive nephrolithiasis  This is in the setting of a chronic suprapubic catheter  In the past, the patient had grown ESBL producing Proteus along with Enterococcus faecalis  Currently on IV meropenem  Urine culture with Proteus mirabilis  Obstructive right-sided nephrolithiasis  Urology following  Plan for cystoscopy and stent placement  TOBIN on CKD  Management as per the primary team  Antibiotics will be renally adjusted  Thrush  On p.o. fluconazole    Recommendations:    Continue IV meropenem  Patient should have his obstructive nephrolithiasis resolved for appropriate source control.  This should be done sooner rather than later given history of MDRO to avoid prolonged courses of antibiotics  Continue p.o. fluconazole for total of 7 days  Continue to monitor daily labs for antibiotic toxicities  Further recommendations will depend on the above work-up and clinical progress     The plan of care was discussed with the primary hospital team, Rick Mcneil*     Recommendations were also discussed with the patient; all questions were answered.     Thank you for this consultation. Please don't hesitate to call the ID team for questions or any acute changes in patient's clinical condition.    Please note that this report has been produced using speech recognition software and may contain errors related to that system including, but not limited to, errors in grammar, punctuation, and spelling, as well as words and phrases that possibly may have been recognized inappropriately.  If there are any questions or concerns, contact the dictating provider for clarification.    The 21st Century Cures Act makes medical notes like these available to patients in the interest of transparency. Please be advised this is a medical document. Medical documents  are intended to carry relevant information, facts as evident, and the clinical opinion of the practitioner. The medical note is intended as peer to peer communication and may appear blunt or direct. It is written in medical language and may contain abbreviations or verbiage that are unfamiliar.     Gaston Puentes MD  [facility]. Tel: [phone]. Fax: [phone].     Jose Ruvalcaba : 1970 MRN: [id number] [facility]: [id number]

## 2024-07-27 NOTE — OPERATIVE REPORT
[facility]   part of [facility]    Operative Note         Jose Ruvalcaba Location: OR   CSN [id number] MRN [id number]   Admission Date 7/25/2024 Operation Date 7/27/2024   Attending Physician Rick Mcneil*       Patient Name: Jose Ruvalcaba     Preoperative Diagnosis: Hydronephrosis [N13.30]     Postoperative Diagnosis: Hydronephrosis [N13.30]     Procedure(s):  CYSTOSCOPY. RETROGRADE PYELOGRAM,  INSERTION URETERAL STENT- RIGHT     Primary Surgeon: Andrae Nation MD           Anesthesia: General     Specimen: * No specimens in log *     Estimated Blood Loss: No data recorded     Complications: none      Indications for procedure: SIRS      Surgical Findings: ??  Filling defect distally and only mild hydro      Complexity: LOW  (optional)    Operative Summary:    The patient was well identified in the operating room table.  The patient was draped and prepped in usual sterile fashion in lithotomy position.  All pressure points were padded.  After receiving IV antibiotics and Venodyne's within an hour of surgery we performed rigid cystoscopy which noted bilobar hypertrophy of the prostate.  We clamped the suprapubic tube that was in place we can fill the bladder.  The there was no evidence of tumor or stone.  The trigone mucosa was somewhat inflamed from the suprapubic tube.  We initially placed a 6 Singaporean ureteral stent into the distal ureter and performed a retrograde urogram which noted a questionable filling defect about 3 to 4 cm above the ureter ureteral orifice.  The rest of the collecting system was just mildly dilated no severe or moderate hydronephrosis no clear filling defect to either.  Because the patient came in with fever and the like we placed a 6 x 26 double-J stent with good curls in the kidney and bladder noted.  The suprapubic tube was placed back to gravity drainage the patient was transferred to the recovery room in stable condition.  As an outpatient the patient  can have his stones definitively managed after a negative urine culture and proper management of his infections.       Implants:   Implant Name Type Inv. Item Serial No.  Lot No. LRB No. Used Action   STENT URO [id number] PGTL SFT HYDRPHLC COAT - SN/A  STENT URO [id number] PGTL SFT HYDRPHLC COAT N/A Fresh ! [id number] Right 1 Implanted        Drains: 6  x  26  and SPT      Condition: stable        Andrae Nation MD

## 2024-07-27 NOTE — OR NURSING
1720 - Hailey, patient's life partner gave verbal consents for surgery and anesthesia as authorized by patient after several failed attempts to contact POA and family members.

## 2024-07-27 NOTE — PROGRESS NOTES
[facility]  Hospitalist Progress Note                                                                     [facility]   part of [facility]        Jose Ruvalcaba  1/11/1970    SUBJECTIVE:  Patient seen and examined.  Feeling better (nodding).   Denies CP/SOB.  NAD.       OBJECTIVE:  Temp:  [97.8 °F (36.6 °C)-98.8 °F (37.1 °C)] 98.1 °F (36.7 °C)  Pulse:  [74-93] 74  Resp:  [16-20] 17  BP: (119-154)/(55-66) 131/59  SpO2:  [93 %-96 %] 96 %  Exam  Gen: No acute distress, alert and oriented x3, no focal neurologic deficits  Pulm: Lungs clear bilaterally, normal respiratory effort  CV: Heart with regular rate and rhythm, no murmur.  Normal PMI.    Abd: Abdomen soft, nontender, nondistended, no organomegaly, bowel sounds present, suprapubic TTP   MSK: Full range of motion in extremities, no clubbing, no cyanosis  Skin: no rashes or lesions, R hemiparesis     Labs:   Recent Labs   Lab 07/25/24 2233 07/26/24  1035 07/27/24  0902   WBC 13.1* 10.5 7.7   HGB 11.7* 10.8* 10.2*   MCV 87.7 91.6 90.2   .0 416.0 398.0   INR 2.31*  --   --        Recent Labs   Lab 07/25/24 2233 07/26/24  1035 07/27/24  0902   * 137 140  140   K 4.2 4.2 3.5  3.5    106 109  109   CO2 23.0 22.0 24.0  24.0   BUN 28* 31* 30*  30*   CREATSERUM 1.73* 1.70* 1.52*  1.52*   CA 9.2 9.4 9.0  9.0   MG  --  2.0 2.2   * 100* 116*  116*       Recent Labs   Lab 07/25/24 2233 07/27/24  0902   ALT 28 37   AST 28 42*   ALB 4.1 3.8       Recent Labs   Lab 07/27/24  0009 07/27/24  0541   PGLU 113* 127*       Meds:   Scheduled:    amLODIPine  10 mg Per G Tube QPM    ascorbic acid  500 mg Per G Tube Daily    atorvastatin  40 mg Per G Tube Nightly    carvedilol  25 mg Per G Tube BID with meals    docusate  100 mg Per G Tube BID    terazosin  10 mg Per G Tube Nightly    hydrALAZINE  100 mg Per G Tube TID    rivaroxaban  20 mg Per G Tube Daily    meropenem  500 mg  [Patient] : patient Intravenous Q8H    buPROPion  100 mg Per G Tube TID    fluconazole  200 mg Per G Tube Daily     Continuous Infusions:    sodium chloride 125 mL/hr at 07/26/24 0135    lactated ringers 125 mL/hr at 07/27/24 0156    dextrose 10%       PRN:   acetaminophen    ondansetron    dextrose 10%    lipase-protease-amylase (Lip-Prot-Amyl) **AND** sodium bicarbonate    prochlorperazine    Assessment/Plan:  Principal Problem:    Nosocomial pneumonia  Active Problems:    Urinary tract infection associated with cystostomy catheter, initial encounter ([facility])    Hyponatremia    Thrush, oral     54 year old male with PMH sig for 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     Sepsis POA 2/2 CAUTI +/- RML PNA  - given hx of MDRO, started on meropenem  - await urine cx  - obtain sputum cx if possible, check urine legionella/strep  - consult ID    R distal ureter stone with hydroureter  - urology consulted  - will need cysto and stent, timing TBD - OR today      Oral thrush  - fluconazole per ID     Acute Renal Injury  - prerenal, infection  - cont isotonic crystalloids     CVA   - resulting in R hemiparesis, nonverbal     Suprapubic cath  - routine cares     G tube  - routine cares     Essential HTN  - coreg, amlodipine, hydralazine     Hyperlipidemia  - statin    Remote hx of LLE DVT in 2023   - on xarelto   - certainly high risk of VTE, but given hx of hemorrhagic stroke the dosing should probably be revisited by PCP     FEN: Tfs, PT/OT  Proph: Brianna, yasir Mcneil MD  [facility]  Message over [employer]  Pager: [phone]

## 2024-07-27 NOTE — PLAN OF CARE
Problem: Patient/Family Goals  Goal: Patient/Family Long Term Goal  Description: Patient's Long Term Goal: Go home    Interventions:  - IV Abx  - See additional Care Plan goals for specific interventions  Outcome: Progressing  Goal: Patient/Family Short Term Goal  Description: Patient's Short Term Goal:   7/26 AM: Tube feedings  07/26/2024 - control nausea    Interventions:   - monitor gastric residuals  - compazine and Zofran as ordered.   -   - See additional Care Plan goals for specific interventions  Outcome: Progressing     Problem: RESPIRATORY - ADULT  Goal: Achieves optimal ventilation and oxygenation  Description: INTERVENTIONS:  - Assess for changes in respiratory status  - Assess for changes in mentation and behavior  - Position to facilitate oxygenation and minimize respiratory effort  - Oxygen supplementation based on oxygen saturation or ABGs  - Provide Smoking Cessation handout, if applicable  - Encourage broncho-pulmonary hygiene including cough, deep breathe, Incentive Spirometry  - Assess the need for suctioning and perform as needed  - Assess and instruct to report SOB or any respiratory difficulty  - Respiratory Therapy support as indicated  - Manage/alleviate anxiety  - Monitor for signs/symptoms of CO2 retention  Outcome: Progressing     Problem: GASTROINTESTINAL - ADULT  Goal: Minimal or absence of nausea and vomiting  Description: INTERVENTIONS:  - Maintain adequate hydration with IV or PO as ordered and tolerated  - Nasogastric tube to low intermittent suction as ordered  - Evaluate effectiveness of ordered antiemetic medications  - Provide nonpharmacologic comfort measures as appropriate  - Advance diet as tolerated, if ordered  - Obtain nutritional consult as needed  - Evaluate fluid balance  Outcome: Progressing  Goal: Maintains adequate nutritional intake (undernourished)  Description: INTERVENTIONS:  - Monitor percentage of each meal consumed  - Identify factors contributing to  decreased intake, treat as appropriate  - Assist with meals as needed  - Monitor I&O, WT and lab values  - Obtain nutritional consult as needed  - Optimize oral hygiene and moisture  - Encourage food from home; allow for food preferences  - Enhance eating environment  Outcome: Progressing     Problem: GENITOURINARY - ADULT  Goal: Absence of urinary retention  Description: INTERVENTIONS:  - Assess patient’s ability to void and empty bladder  - Monitor intake/output and perform bladder scan as needed  - Follow urinary retention protocol/standard of care  - Consider collaborating with pharmacy to review patient's medication profile  - Implement strategies to promote bladder emptying  Outcome: Progressing     Problem: METABOLIC/FLUID AND ELECTROLYTES - ADULT  Goal: Glucose maintained within prescribed range  Description: INTERVENTIONS:  - Monitor Blood Glucose as ordered  - Assess for signs and symptoms of hyperglycemia and hypoglycemia  - Administer ordered medications to maintain glucose within target range  - Assess barriers to adequate nutritional intake and initiate nutrition consult as needed  - Instruct patient on self management of diabetes  Outcome: Progressing     Problem: SKIN/TISSUE INTEGRITY - ADULT  Goal: Skin integrity remains intact  Description: INTERVENTIONS  - Assess and document risk factors for pressure ulcer development  - Assess and document skin integrity  - Monitor for areas of redness and/or skin breakdown  - Initiate interventions, skin care algorithm/standards of care as needed  Outcome: Progressing

## 2024-07-27 NOTE — ANESTHESIA PREPROCEDURE EVALUATION
PRE-OP EVALUATION    Patient Name: Jose Ruvalcaba    Admit Diagnosis: Hyponatremia [E87.1]  Nosocomial pneumonia [J18.9, Y95]  Thrush, oral [B37.0]  Urinary tract infection associated with cystostomy catheter, initial encounter ([facility]) [T83.510A, N39.0]    Pre-op Diagnosis: Hydronephrosis [N13.30]    CYSTOSCOPY. RETROGRADE PYELOGRAM,  INSERTION URETERAL STENT- RIGHT    Anesthesia Procedure: CYSTOSCOPY. RETROGRADE PYELOGRAM,  INSERTION URETERAL STENT- RIGHT (Right)    Surgeons and Role:     * Andrae Nation MD - Primary    Pre-op vitals reviewed.  Temp: 97.7 °F (36.5 °C)  Pulse: 77  Resp: 18  BP: 164/80  SpO2: 98 %  Body mass index is 27.74 kg/m².    Current medications reviewed.  Hospital Medications:   [COMPLETED] potassium chloride 40 mEq in 250mL sodium chloride 0.9% IVPB premix  40 mEq Intravenous Once    guaiFENesin (Robitussin) 100 MG/5 ML oral liquid 100 mg  100 mg Per G Tube Q4H PRN    [] sodium chloride 0.9% infusion   Intravenous Continuous    [COMPLETED] sodium chloride 0.9 % IV bolus 500 mL  500 mL Intravenous Once    Followed by    sodium chloride 0.9% infusion   Intravenous Continuous    acetaminophen (Tylenol) 160 MG/5ML oral liquid 1,000 mg  1,000 mg Oral Q6H PRN    amLODIPine (Norvasc) tab 10 mg  10 mg Per G Tube QPM    ascorbic acid (Vitamin C) tab 500 mg  500 mg Per G Tube Daily    atorvastatin (Lipitor) tab 40 mg  40 mg Per G Tube Nightly    carvedilol (Coreg) tab 25 mg  25 mg Per G Tube BID with meals    docusate (Colace) 50 MG/5ML oral solution 100 mg  100 mg Per G Tube BID    terazosin (Hytrin) cap 10 mg  10 mg Per G Tube Nightly    hydrALAZINE (Apresoline) tab 100 mg  100 mg Per G Tube TID    rivaroxaban (Xarelto) tab 20 mg  20 mg Per G Tube Daily    meropenem (Merrem) 500 mg in sodium chloride 0.9% 100 mL IVPB-MBP  500 mg Intravenous Q8H    buPROPion (Wellbutrin) tab 100 mg  100 mg Per G Tube TID    ondansetron (Zofran) 4 MG/2ML injection 4 mg  4 mg Intravenous Q6H PRN     lactated ringers infusion   Intravenous Continuous    dextrose 10% infusion (TPN no rate)   Intravenous Continuous PRN    pancrelipase (Lip-Prot-Amyl) (Zenpep) DR particles cap 10,000 Units  10,000 Units Per G Tube PRN    And    sodium bicarbonate tab 325 mg  325 mg Per G Tube PRN    fluconazole (Diflucan) tab 200 mg  200 mg Per G Tube Daily    prochlorperazine (Compazine) 10 MG/2ML injection 10 mg  10 mg Intravenous Q6H PRN    [COMPLETED] meropenem (Merrem) 1 g in sodium chloride 0.9% 100 mL IVPB-MBP  1 g Intravenous Once       Outpatient Medications:     Medications Prior to Admission   Medication Sig Dispense Refill Last Dose    buPROPion 100 MG Oral Tab Take 1 tablet (100 mg total) by mouth 3 (three) times daily.   7/26/2024    buPROPion  MG Oral Tablet 24 Hr Take 2 tablets (300 mg total) by mouth daily. 60 tablet 0 7/26/2024    carvedilol 25 MG Oral Tab 1 tablet (25 mg total) by Per G Tube route 2 (two) times daily with meals. 60 tablet 0 7/26/2024    clotrimazole 1 % External Cream Apply 1 Application topically 2 (two) times daily. To genital area 3 g 0 Past Month    lisinopril 20 MG Oral Tab Take 1 tablet (20 mg total) by mouth daily.   7/26/2024    acetaminophen 500 MG Oral Tab Take 2 tablets (1,000 mg total) by mouth every 6 (six) hours as needed for Pain.   7/25/2024    docusate sodium 100 MG Oral Tab 1 tablet (100 mg total) 2 (two) times daily. Per G Tube   7/26/2024    atorvastatin 40 MG Oral Tab 1 tablet (40 mg total) by Per G Tube route nightly.   7/26/2024    rivaroxaban 20 MG Oral Tab 1 tablet (20 mg total) by Per G Tube route daily.   7/26/2024    ascorbic acid 500 MG Oral Tab 1 tablet (500 mg total) by Per G Tube route daily.   7/26/2024    cholecalciferol 25 MCG (1000 UT) Oral Cap 2 capsules (2,000 Units total). Per G Tube   7/26/2024    doxazosin 8 MG Oral Tab 1 tablet (8 mg total) by Per G Tube route nightly. Hold if systolic less than 100   7/26/2024    ferrous sulfate 325 (65 FE) MG Oral  Tab EC 1 tablet (325 mg total) in the morning, at noon, and at bedtime. Per G-tube   7/26/2024    hydrALAZINE 100 MG Oral Tab 1 tablet (100 mg total) by Per G Tube route 3 (three) times daily. Per G-tube. Hold if systolic <100   7/26/2024    amLODIPine 10 MG Oral Tab 1 tablet (10 mg total) by Per G Tube route every evening. Hold SBP<110mmHg   7/26/2024    Nutritional Supplements (ARGINAID) Oral Powd Pack 1 packet by Per G Tube route 2 (two) times a day.   Unknown    Nutritional Supplements (JEVITY 1.5 LISA) Oral Liquid 350 mL. 350ml/hr 4x daily   Unknown       Allergies: Augmentin [amoxicillin-pot clavulanate] and Penicillins      Anesthesia Evaluation        Anesthetic Complications           GI/Hepatic/Renal      (+) GERD and poorly controlled                          Cardiovascular              Unable to assess MET.    (+) hypertension and poorly controlled                                    Endo/Other    Negative endo/other ROS.                              Pulmonary                           Neuro/Psych          (+) CVA and residual deficits                   Right hemiplegia with left side preserved  Aphasic but communicative and responsive to questions.   Patient Active Problem List:     Hemorrhagic cerebrovascular accident (CVA) (HCC)     Acute UTI     Oropharyngeal dysphagia     Primary hypertension     Aphasia as late effect of cerebrovascular accident     Urinary retention     Right hemiplegia (HCC)     Pressure ulcer, buttock, right, unstageable (HCC)     Pressure injury of left buttock, unstageable (HCC)     Sepsis due to urinary tract infection (HCC)     Anemia     Azotemia     Hyperglycemia     Hypercalcemia     Hypokalemia     Fever, unspecified fever cause     Uncontrolled hypertension     Pyelonephritis     Lactic acidosis     Elevated troponin     Acute renal failure (ARF) (HCC)     Acute kidney injury (HCC)     Acute renal failure superimposed on chronic kidney disease, unspecified CKD stage,  unspecified acute renal failure type     Urinary tract infection without hematuria, site unspecified     Hypotension, unspecified hypotension type     Depressive disorder     Gross hematuria     Nosocomial pneumonia     Urinary tract infection associated with cystostomy catheter, initial encounter ([facility])     Hyponatremia     Thrush, oral            Past Surgical History:   Procedure Laterality Date    Cystourethroscopy  12/01/2022    bilateral retrograde pyelograms     Social History     Socioeconomic History    Marital status:    Tobacco Use    Smoking status: Never    Smokeless tobacco: Never   Vaping Use    Vaping status: Former    Quit date: 1/7/2020   Substance and Sexual Activity    Alcohol use: Not Currently    Drug use: Never     History   Drug Use Unknown     Available pre-op labs reviewed.  Lab Results   Component Value Date    WBC 7.7 07/27/2024    RBC 3.46 (L) 07/27/2024    HGB 10.2 (L) 07/27/2024    HCT 31.2 (L) 07/27/2024    MCV 90.2 07/27/2024    MCH 29.5 07/27/2024    MCHC 32.7 07/27/2024    RDW 13.8 07/27/2024    .0 07/27/2024     Lab Results   Component Value Date     07/27/2024     07/27/2024    K 3.5 07/27/2024    K 3.5 07/27/2024     07/27/2024     07/27/2024    CO2 24.0 07/27/2024    CO2 24.0 07/27/2024    BUN 30 (H) 07/27/2024    BUN 30 (H) 07/27/2024    CREATSERUM 1.52 (H) 07/27/2024    CREATSERUM 1.52 (H) 07/27/2024     (H) 07/27/2024     (H) 07/27/2024    CA 9.0 07/27/2024    CA 9.0 07/27/2024     Lab Results   Component Value Date    INR 2.31 (H) 07/25/2024         Airway      Mallampati: IV  Mouth opening: <3 FB  TM distance: < 4 cm  Neck ROM: limited Cardiovascular    Cardiovascular exam normal.         Dental  Comment: Difficult to assess airway with patient's limited mouth opening. Patient does not endorse loose teeth. He acknowledges oral thrush, and that his mouth is painful.            Pulmonary    Pulmonary exam  normal.                 Other findings              ASA: 3   Plan: general  NPO status verified and patient meets guidelines.        Comment: I discussed the rare but serious complications of anesthesia, including but not limited to cardiovascular complications, allergic reaction to medications, pulomary/respiratory complications, post-operative nausea, post-op pain, damage to dentition, aspiration, or sore throat. The patient expressed understanding of plan and agreement.     Consent obtained with help of life partner, Hailey, via telephone. POA currently unavailable (not answering or returning phone calls). Jose acknowledges agreement that Hailey can provide consent for anesthesia for this procedure.   Plan/risks discussed with: patient                Present on Admission:  **None**

## 2024-07-27 NOTE — CONSULTS
[facility] CONSULT NOTE     Jose Ruvalcaba Patient Status:  Inpatient    1970 MRN [id number]   Location [facility] 5NW-A Attending Hubert Vázquez MD   Hosp Day # 0 PCP Rick Lawson MD     Reason for Consultation:  Stones      History of Present Illness:  Jose Ruvalcaba is a a(n) 54 year old male.     CVA    Multiple medical issues   Admit with pneumonia  Noted incidental stones     Neurogeic bladder -  Pt of Dr Castelan -- SPT -- changes  q 4 weeks     History:  Past Medical History:    Aphasia    Calculus of kidney    Congenital anomaly of heart (HCC)    Deep vein thrombosis (HCC)    Dysphasia    Esophageal reflux    Essential hypertension    Feeding by G-tube (HCC)    Hemiparesis (HCC)    High blood pressure    Problems with swallowing    feeding tube    Pulmonary embolism (HCC)    Renal disorder    Right hemiplegia (HCC)    Stroke (HCC)    Visual impairment     Past Surgical History:   Procedure Laterality Date    Cystourethroscopy  2022    bilateral retrograde pyelograms     Family History   Problem Relation Age of Onset    Cancer Father       reports that he has never smoked. He has never used smokeless tobacco. He reports that he does not currently use alcohol. He reports that he does not use drugs.    Allergies:  Allergies   Allergen Reactions    Augmentin [Amoxicillin-Pot Clavulanate] UNKNOWN     Per NH records; tolerates cephalosporins    Penicillins RASH     Per fiance at this time. Tolerates cephalosporins       Medications:    Current Facility-Administered Medications:     [COMPLETED] sodium chloride 0.9 % IV bolus 500 mL, 500 mL, Intravenous, Once **FOLLOWED BY** sodium chloride 0.9% infusion, , Intravenous, Continuous    acetaminophen (Tylenol) 160 MG/5ML oral liquid 1,000 mg, 1,000 mg, Oral, Q6H PRN    amLODIPine (Norvasc) tab 10 mg, 10 mg, Per G Tube, QPM    ascorbic acid (Vitamin C) tab 500 mg, 500 mg, Per G Tube, Daily    atorvastatin (Lipitor) tab 40 mg, 40 mg, Per G  Tube, Nightly    carvedilol (Coreg) tab 25 mg, 25 mg, Per G Tube, BID with meals    docusate (Colace) 50 MG/5ML oral solution 100 mg, 100 mg, Per G Tube, BID    terazosin (Hytrin) cap 10 mg, 10 mg, Per G Tube, Nightly    hydrALAZINE (Apresoline) tab 100 mg, 100 mg, Per G Tube, TID    rivaroxaban (Xarelto) tab 20 mg, 20 mg, Per G Tube, Daily    meropenem (Merrem) 500 mg in sodium chloride 0.9% 100 mL IVPB-MBP, 500 mg, Intravenous, Q8H    buPROPion (Wellbutrin) tab 100 mg, 100 mg, Per G Tube, TID    ondansetron (Zofran) 4 MG/2ML injection 4 mg, 4 mg, Intravenous, Q6H PRN    lactated ringers infusion, , Intravenous, Continuous    dextrose 10% infusion (TPN no rate), , Intravenous, Continuous PRN    pancrelipase (Lip-Prot-Amyl) (Zenpep) DR particles cap 10,000 Units, 10,000 Units, Per G Tube, PRN **AND** sodium bicarbonate tab 325 mg, 325 mg, Per G Tube, PRN    fluconazole (Diflucan) tab 200 mg, 200 mg, Per G Tube, Daily    prochlorperazine (Compazine) 10 MG/2ML injection 10 mg, 10 mg, Intravenous, Q6H PRN    Review of Systems:  Pertinent items are noted in HPI.    Physical Exam:  /60 (BP Location: Left arm)   Pulse 93   Temp 98.8 °F (37.1 °C) (Oral)   Resp 18   Ht 6' 2\" (1.88 m)   Wt 216 lb 0.8 oz (98 kg)   SpO2 94%   BMI 27.74 kg/m²   GENERAL: well developed, well nourished, no apparent distress  EYES: sclera non-icteric, no redness   MOUTH:  moist oral mucosa, no lesions  LUNGS: normal respiratory motion without distress  GI: Abdomen soft without organomegally, no tenderness, normal bowel sounds, No CVAT     SP T  intact     EXTREMITIES: No edema or cyanosis, no calf pain, no appreciable joint deformities     Laboratory Data:  Lab Results   Component Value Date    WBC 10.5 07/26/2024    HGB 10.8 07/26/2024    HCT 33.7 07/26/2024    .0 07/26/2024    CREATSERUM 1.70 07/26/2024    BUN 31 07/26/2024     07/26/2024    K 4.2 07/26/2024     07/26/2024    CO2 22.0 07/26/2024      07/26/2024    CA 9.4 07/26/2024    ALB 4.1 07/25/2024    ALKPHO 131 07/25/2024    BILT 0.5 07/25/2024    TP 8.1 07/25/2024    AST 28 07/25/2024    ALT 28 07/25/2024    PTT 52.1 07/25/2024    INR 2.31 07/25/2024    PTP 25.6 07/25/2024    MG 2.0 07/26/2024     Lab Results   Component Value Date    COLORUR Yellow 07/25/2024    CLARITY Ex.Turbid 07/25/2024    SPECGRAVITY 1.018 07/25/2024    GLUUR Normal 07/25/2024    BILUR Negative 07/25/2024    KETUR Trace 07/25/2024    BLOODURINE 2+ 07/25/2024    PHURINE 8.0 07/25/2024    PROUR 200 07/25/2024    UROBILINOGEN Normal 07/25/2024    NITRITE Negative 07/25/2024    LEUUR 500 07/25/2024     No results found for: \"PSA\"      Intake/Output Summary (Last 24 hours) at 7/26/2024 2112  Last data filed at 7/26/2024 1958  Gross per 24 hour   Intake 2675 ml   Output 650 ml   Net 2025 ml         Imaging:    Moderate hydroureteronephrosis of the right collecting system secondary to an obstructing 10 x 7 mm calculus of the right distal ureter.  This lies within 2-3 cm from the UVJ.  Associated perivesicular and periureteral stranding suggesting a component of    cystitis and ascending UTI.  Recommend correlation with urinalysis.     Impression and Plan:  Patient Active Problem List   Diagnosis    Hemorrhagic cerebrovascular accident (CVA) (HCC)    Acute UTI    Oropharyngeal dysphagia    Primary hypertension    Aphasia as late effect of cerebrovascular accident    Urinary retention    Right hemiplegia (HCC)    Pressure ulcer, buttock, right, unstageable (HCC)    Pressure injury of left buttock, unstageable (HCC)    Sepsis due to urinary tract infection (HCC)    Anemia    Azotemia    Hyperglycemia    Hypercalcemia    Hypokalemia    Fever, unspecified fever cause    Uncontrolled hypertension    Pyelonephritis    Lactic acidosis    Elevated troponin    Acute renal failure (ARF) (HCC)    Acute kidney injury (HCC)    Acute renal failure superimposed on chronic kidney disease, unspecified CKD  stage, unspecified acute renal failure type    Urinary tract infection without hematuria, site unspecified    Hypotension, unspecified hypotension type    Depressive disorder    Gross hematuria    Nosocomial pneumonia    Urinary tract infection associated with cystostomy catheter, initial encounter ([facility])    Hyponatremia    Thrush, oral        Neurogenic Bladder   SPT  - change q  4 weeks   FU  Dr Castelan for SPT  change     2. CAUTI/  MDR  in past     Cont IV anti BX     3.    LLP  8 mm stone  K  4.    RUP   3 mm stone    5.  R distal ureter  10 mm stone with  Hydro   Need to await negative U  CX  prior to URS     Will SW pt as to JJ and Cysto     6.  TOBIN          Thank you for allowing me to participate in the care of your patient.    Please give me a call on my cell if you have any questions of concerns.     Oral Nation MD   [facility]  Department of Urology  Cell: [phone]  Office :[phone]        7/26/2024  9:12 PM

## 2024-07-28 LAB
ANION GAP SERPL CALC-SCNC: 6 MMOL/L (ref 0–18)
BASOPHILS # BLD AUTO: 0.01 X10(3) UL (ref 0–0.2)
BASOPHILS NFR BLD AUTO: 0.1 %
BUN BLD-MCNC: 21 MG/DL (ref 9–23)
CALCIUM BLD-MCNC: 9.3 MG/DL (ref 8.7–10.4)
CHLORIDE SERPL-SCNC: 110 MMOL/L (ref 98–112)
CO2 SERPL-SCNC: 26 MMOL/L (ref 21–32)
CREAT BLD-MCNC: 1.06 MG/DL
EGFRCR SERPLBLD CKD-EPI 2021: 82 ML/MIN/1.73M2 (ref 60–?)
EOSINOPHIL # BLD AUTO: 0.01 X10(3) UL (ref 0–0.7)
EOSINOPHIL NFR BLD AUTO: 0.1 %
ERYTHROCYTE [DISTWIDTH] IN BLOOD BY AUTOMATED COUNT: 13.4 %
GLUCOSE BLD-MCNC: 105 MG/DL (ref 70–99)
GLUCOSE BLD-MCNC: 118 MG/DL (ref 70–99)
GLUCOSE BLD-MCNC: 126 MG/DL (ref 70–99)
GLUCOSE BLD-MCNC: 136 MG/DL (ref 70–99)
GLUCOSE BLD-MCNC: 146 MG/DL (ref 70–99)
GLUCOSE BLD-MCNC: 164 MG/DL (ref 70–99)
HCT VFR BLD AUTO: 32.5 %
HGB BLD-MCNC: 10.5 G/DL
IMM GRANULOCYTES # BLD AUTO: 0.04 X10(3) UL (ref 0–1)
IMM GRANULOCYTES NFR BLD: 0.5 %
LYMPHOCYTES # BLD AUTO: 0.54 X10(3) UL (ref 1–4)
LYMPHOCYTES NFR BLD AUTO: 6.8 %
MAGNESIUM SERPL-MCNC: 1.6 MG/DL (ref 1.6–2.6)
MCH RBC QN AUTO: 29.2 PG (ref 26–34)
MCHC RBC AUTO-ENTMCNC: 32.3 G/DL (ref 31–37)
MCV RBC AUTO: 90.5 FL
MONOCYTES # BLD AUTO: 0.41 X10(3) UL (ref 0.1–1)
MONOCYTES NFR BLD AUTO: 5.2 %
NEUTROPHILS # BLD AUTO: 6.93 X10 (3) UL (ref 1.5–7.7)
NEUTROPHILS # BLD AUTO: 6.93 X10(3) UL (ref 1.5–7.7)
NEUTROPHILS NFR BLD AUTO: 87.3 %
OSMOLALITY SERPL CALC.SUM OF ELEC: 300 MOSM/KG (ref 275–295)
PLATELET # BLD AUTO: 433 10(3)UL (ref 150–450)
POTASSIUM SERPL-SCNC: 3.4 MMOL/L (ref 3.5–5.1)
RBC # BLD AUTO: 3.59 X10(6)UL
SODIUM SERPL-SCNC: 142 MMOL/L (ref 136–145)
WBC # BLD AUTO: 7.9 X10(3) UL (ref 4–11)

## 2024-07-28 PROCEDURE — 80048 BASIC METABOLIC PNL TOTAL CA: CPT | Performed by: UROLOGY

## 2024-07-28 PROCEDURE — 82962 GLUCOSE BLOOD TEST: CPT

## 2024-07-28 PROCEDURE — 92610 EVALUATE SWALLOWING FUNCTION: CPT

## 2024-07-28 PROCEDURE — 83735 ASSAY OF MAGNESIUM: CPT | Performed by: UROLOGY

## 2024-07-28 PROCEDURE — 84132 ASSAY OF SERUM POTASSIUM: CPT | Performed by: UROLOGY

## 2024-07-28 PROCEDURE — 84132 ASSAY OF SERUM POTASSIUM: CPT | Performed by: HOSPITALIST

## 2024-07-28 PROCEDURE — 85025 COMPLETE CBC W/AUTO DIFF WBC: CPT | Performed by: UROLOGY

## 2024-07-28 RX ORDER — LISINOPRIL 20 MG/1
20 TABLET ORAL DAILY
Status: DISCONTINUED | OUTPATIENT
Start: 2024-07-28 | End: 2024-08-01

## 2024-07-28 RX ORDER — POTASSIUM CHLORIDE 1.5 G/1.58G
40 POWDER, FOR SOLUTION ORAL EVERY 4 HOURS
Status: COMPLETED | OUTPATIENT
Start: 2024-07-28 | End: 2024-07-28

## 2024-07-28 RX ORDER — POTASSIUM CHLORIDE 20 MEQ/1
40 TABLET, EXTENDED RELEASE ORAL EVERY 4 HOURS
Status: COMPLETED | OUTPATIENT
Start: 2024-07-28 | End: 2024-07-28

## 2024-07-28 RX ORDER — MAGNESIUM OXIDE 400 MG/1
400 TABLET ORAL ONCE
Status: COMPLETED | OUTPATIENT
Start: 2024-07-28 | End: 2024-07-28

## 2024-07-28 NOTE — PROGRESS NOTES
Infectious Disease Progress Note      Date of admission: 7/25/2024  9:57 PM     Reason for consult: Catheter associated UTI, pyelonephritis    Subjective: Doing about the same.  No abdominal pain.  No nausea or vomiting.  No diarrhea.    The rest of the systems were reviewed and found to be negative except was mentioned above    Interval events: This is a 54-year-old male patient with history of stroke, currently bedbound with a suprapubic catheter, presents here with high fevers in the setting of a catheter associated UTI.  Patient had grown ESBL producing organisms along with Enterococcus faecalis in the past.  Currently on IV meropenem, currently culture growing Proteus mirabilis, susceptibilities pending  CT now showing obstructing right-sided nephrolithiasis, status post cystoscopy and stenting on 7/27/2024    Medications:    potassium chloride    guaiFENesin    [COMPLETED] sodium chloride **FOLLOWED BY** sodium chloride    acetaminophen    amLODIPine    ascorbic acid    atorvastatin    carvedilol    docusate    terazosin    hydrALAZINE    rivaroxaban    meropenem    buPROPion    ondansetron    lactated ringers    dextrose 10%    lipase-protease-amylase (Lip-Prot-Amyl) **AND** sodium bicarbonate    fluconazole    prochlorperazine     Allergies:  Allergies   Allergen Reactions    Augmentin [Amoxicillin-Pot Clavulanate] UNKNOWN     Per NH records; tolerates cephalosporins    Penicillins RASH     Per fiance at this time. Tolerates cephalosporins       Physical Exam:  Vitals:    07/28/24 0830   BP: (!) 163/83   Pulse: 83   Resp: 19   Temp: 97.8 °F (36.6 °C)     Vitals signs and nursing note reviewed.   Constitutional:       Appearance: Normal appearance.   HENT:      Head: Normocephalic and atraumatic.      Mouth: Mucous membranes are moist.   Neck:      Musculoskeletal: Neck supple.   Cardiovascular:      Rate and Rhythm: Normal rate.      Heart sounds: Normal heart sounds. No murmur. No friction rub. No gallop.     Pulmonary:      Effort: Pulmonary effort is normal. No respiratory distress.      Breath sounds: Normal breath sounds. No stridor. No wheezing, rhonchi or rales.   Chest:      Chest wall: No tenderness.   Abdominal:      General: Abdomen is flat. There is no distension.      Palpations: Abdomen is soft. There is no mass.      Tenderness: There is no tenderness. There is no guarding or rebound.      Hernia: No hernia is present.   Musculoskeletal:      Right lower leg: No edema.      Left lower leg: No edema.   Skin:     General: Skin is warm and dry.     Laboratory data:  I have reviewed all the lab results independently.  Lab Results   Component Value Date    WBC 7.9 07/28/2024    HGB 10.5 07/28/2024    HCT 32.5 07/28/2024    .0 07/28/2024    CREATSERUM 1.06 07/28/2024    BUN 21 07/28/2024     07/28/2024    K 3.4 07/28/2024    K 3.4 07/28/2024     07/28/2024    CO2 26.0 07/28/2024     07/28/2024    CA 9.3 07/28/2024    MG 1.6 07/28/2024      Recent Labs   Lab 07/28/24  0636   RBC 3.59*   HGB 10.5*   HCT 32.5*   MCV 90.5   MCH 29.2   MCHC 32.3   RDW 13.4   NEPRELIM 6.93   WBC 7.9   .0      Microbiology data:  Hospital Encounter on 07/25/24   1. Blood Culture     Status: None (Preliminary result)    Collection Time: 07/25/24 10:34 PM    Specimen: Blood,peripheral   Result Value Ref Range    Blood Culture Result No Growth 2 Days N/A   2. Urine Culture, Routine     Status: Abnormal (Preliminary result)    Collection Time: 07/25/24 10:33 PM    Specimen: Urine, clean catch   Result Value Ref Range    Urine Culture 10,000 - 50,000 CFU/ML Proteus mirabilis (A) N/A        Radiology:  I have reviewed all imagining data available independently.   CT abdomen pelvis:  Moderate hydronephrosis on the right side with a component of cystitis and right-sided ureteritis and pyelonephritis    Impression:  Jose Ruvalcaba is a 54 year old male with    Acute complicated right-sided pyelonephritis in  the setting of obstructive nephrolithiasis  This is in the setting of a chronic suprapubic catheter  In the past, the patient had grown ESBL producing Proteus along with Enterococcus faecalis  Currently on IV meropenem  Urine culture with Proteus mirabilis, susceptibilities pending  Obstructive right-sided nephrolithiasis  Urology following  Status post cystoscopy and stent placement on 7/27/2024  TOBIN on CKD  Management as per the primary team  Antibiotics will be renally adjusted  Improving  Thrush  On p.o. fluconazole    Recommendations:    Continue IV meropenem  Continue to follow-up on urine cultures for final susceptibilities, final antibiotic selection will depend on susceptibilities  Continue p.o. fluconazole for total of 7 days  Continue to monitor daily labs for antibiotic toxicities  Further recommendations will depend on the above work-up and clinical progress     The plan of care was discussed with the primary hospital team, Rick Mcneil*     Recommendations were also discussed with the patient; all questions were answered.     Thank you for this consultation. Please don't hesitate to call the ID team for questions or any acute changes in patient's clinical condition.    Please note that this report has been produced using speech recognition software and may contain errors related to that system including, but not limited to, errors in grammar, punctuation, and spelling, as well as words and phrases that possibly may have been recognized inappropriately.  If there are any questions or concerns, contact the dictating provider for clarification.    The 21st Century Cures Act makes medical notes like these available to patients in the interest of transparency. Please be advised this is a medical document. Medical documents are intended to carry relevant information, facts as evident, and the clinical opinion of the practitioner. The medical note is intended as peer to peer communication and may  appear blunt or direct. It is written in medical language and may contain abbreviations or verbiage that are unfamiliar.     Gaston Puentes MD  [facility]. Tel: [phone]. Fax: [phone].     Jose Ruvalcaba : 1970 MRN: [id number] CSN: [id number]

## 2024-07-28 NOTE — PLAN OF CARE
Pt is A&O to self, able to reorient. Soft spoken, delayed speech. Room air, O2 sating WNL. Pt self suctions with Lisbeth TINOCON. Tele-NSR. Pt denies pain, nausea, and shortness of breath. Pt asked to eat this morning, pt was on diet at nursing home, MD sosa, speech consulted, cleared for soft easy to chew diet, thin liquids, no straws, will have swallow eval tomorrow. Tube feeds at 20mL but stopped after pt ate 60% of lunch and dinner, per pts girlfriend, he had not gotten tube feeds since October since he eats by mouth. Pink/scar on sacrum, mepliex changed, dressing C/D/I. Suprapubic cath. X2 clear mucous bowel movement. Pt updated on POC, all questions and concerns addressed, pt verbalized understanding. Safety precautions in place, no further needs at this time.     Problem: Patient/Family Goals  Goal: Patient/Family Long Term Goal  Description: Patient's Long Term Goal: Go home with adequate resources    Interventions:  - IV Abx  - Identify barriers to discharge w/pt and caregiver  - Include patient/family/discharge partner in discharge planning  - Arrange for needed discharge resources and transportation as appropriate  - Identify discharge learning needs   - Consider post-discharge preferences of patient/family/discharge partner  - Assess patient's ability to be responsible for managing their own health  - Refer to Case Management Department for coordinating discharge planning if the patient needs post-hospital services  - See additional Care Plan goals for specific interventions  Outcome: Progressing  Goal: Patient/Family Short Term Goal  Description: Patient's Short Term Goal:   7/26 AM: Tube feedings  07/26/2024 - control nausea  07/27/2024  - tolerate  gtube feeding  7/28: speech eval    Interventions:   - monitor gastric residuals  - compazine and Zofran as ordered.   - HOB above 30 degrees.  Check residuals, monitor/medicate for nausea, gradually increase rate as tolerated to a goal of 70  -   - See  additional Care Plan goals for specific interventions  Outcome: Progressing     Problem: RESPIRATORY - ADULT  Goal: Achieves optimal ventilation and oxygenation  Description: INTERVENTIONS:  - Assess for changes in respiratory status  - Assess for changes in mentation and behavior  - Position to facilitate oxygenation and minimize respiratory effort  - Oxygen supplementation based on oxygen saturation or ABGs  - Provide Smoking Cessation handout, if applicable  - Encourage broncho-pulmonary hygiene including cough, deep breathe, Incentive Spirometry  - Assess the need for suctioning and perform as needed  - Assess and instruct to report SOB or any respiratory difficulty  - Respiratory Therapy support as indicated  - Manage/alleviate anxiety  - Monitor for signs/symptoms of CO2 retention  Outcome: Progressing     Problem: GASTROINTESTINAL - ADULT  Goal: Minimal or absence of nausea and vomiting  Description: INTERVENTIONS:  - Maintain adequate hydration with IV or PO as ordered and tolerated  - Nasogastric tube to low intermittent suction as ordered  - Evaluate effectiveness of ordered antiemetic medications  - Provide nonpharmacologic comfort measures as appropriate  - Advance diet as tolerated, if ordered  - Obtain nutritional consult as needed  - Evaluate fluid balance  Outcome: Progressing  Goal: Maintains adequate nutritional intake (undernourished)  Description: INTERVENTIONS:  - Monitor percentage of each meal consumed  - Identify factors contributing to decreased intake, treat as appropriate  - Assist with meals as needed  - Monitor I&O, WT and lab values  - Obtain nutritional consult as needed  - Optimize oral hygiene and moisture  - Encourage food from home; allow for food preferences  - Enhance eating environment  Outcome: Progressing     Problem: GENITOURINARY - ADULT  Goal: Absence of urinary retention  Description: INTERVENTIONS:  - Assess patient’s ability to void and empty bladder  - Monitor  intake/output and perform bladder scan as needed  - Follow urinary retention protocol/standard of care  - Consider collaborating with pharmacy to review patient's medication profile  - Implement strategies to promote bladder emptying  Outcome: Progressing     Problem: METABOLIC/FLUID AND ELECTROLYTES - ADULT  Goal: Glucose maintained within prescribed range  Description: INTERVENTIONS:  - Monitor Blood Glucose as ordered  - Assess for signs and symptoms of hyperglycemia and hypoglycemia  - Administer ordered medications to maintain glucose within target range  - Assess barriers to adequate nutritional intake and initiate nutrition consult as needed  - Instruct patient on self management of diabetes  Outcome: Progressing     Problem: SKIN/TISSUE INTEGRITY - ADULT  Goal: Skin integrity remains intact  Description: INTERVENTIONS  - Assess and document risk factors for pressure ulcer development  - Assess and document skin integrity  - Monitor for areas of redness and/or skin breakdown  - Initiate interventions, skin care algorithm/standards of care as needed  Outcome: Progressing

## 2024-07-28 NOTE — PROGRESS NOTES
[facility]  Hospitalist Progress Note                                                                     [facility]   part of [facility]        Jose Ruvalcaba  1/11/1970    SUBJECTIVE:  Patient seen and examined.  Feeling better.   Denies CP/SOB.  NAD.       OBJECTIVE:  Temp:  [97.2 °F (36.2 °C)-99.1 °F (37.3 °C)] 97.8 °F (36.6 °C)  Pulse:  [77-98] 83  Resp:  [15-20] 19  BP: (154-175)/(61-90) 163/83  SpO2:  [90 %-99 %] 95 %  Exam  Gen: No acute distress, alert and oriented x3, no focal neurologic deficits  Pulm: Lungs clear bilaterally, normal respiratory effort  CV: Heart with regular rate and rhythm, no murmur.  Normal PMI.    Abd: Abdomen soft, nontender, nondistended, no organomegaly, bowel sounds present, suprapubic TTP   MSK: Full range of motion in extremities, no clubbing, no cyanosis  Skin: no rashes or lesions, R hemiparesis     Labs:   Recent Labs   Lab 07/25/24 2233 07/26/24 1035 07/27/24  0902 07/28/24  0636   WBC 13.1* 10.5 7.7 7.9   HGB 11.7* 10.8* 10.2* 10.5*   MCV 87.7 91.6 90.2 90.5   .0 416.0 398.0 433.0   INR 2.31*  --   --   --        Recent Labs   Lab 07/25/24 2233 07/26/24  1035 07/27/24  0902 07/28/24  0636   * 137 140  140 142   K 4.2 4.2 3.5  3.5 3.4*  3.4*    106 109  109 110   CO2 23.0 22.0 24.0  24.0 26.0   BUN 28* 31* 30*  30* 21   CREATSERUM 1.73* 1.70* 1.52*  1.52* 1.06   CA 9.2 9.4 9.0  9.0 9.3   MG  --  2.0 2.2 1.6   * 100* 116*  116* 146*       Recent Labs   Lab 07/25/24 2233 07/27/24  0902   ALT 28 37   AST 28 42*   ALB 4.1 3.8       Recent Labs   Lab 07/27/24  0541 07/27/24  1134 07/27/24  1643 07/28/24  0013 07/28/24  0602   PGLU 127* 115* 104* 136* 164*       Meds:   Scheduled:    potassium chloride  40 mEq Oral Q4H    amLODIPine  10 mg Per G Tube QPM    ascorbic acid  500 mg Per G Tube Daily    atorvastatin  40 mg Per G Tube Nightly    carvedilol  25 mg Per G Tube BID  with meals    docusate  100 mg Per G Tube BID    terazosin  10 mg Per G Tube Nightly    hydrALAZINE  100 mg Per G Tube TID    rivaroxaban  20 mg Per G Tube Daily    meropenem  500 mg Intravenous Q8H    buPROPion  100 mg Per G Tube TID    fluconazole  200 mg Per G Tube Daily     Continuous Infusions:    sodium chloride 125 mL/hr at 07/26/24 0135    lactated ringers 125 mL/hr at 07/28/24 0627    dextrose 10%       PRN:   guaiFENesin    acetaminophen    ondansetron    dextrose 10%    lipase-protease-amylase (Lip-Prot-Amyl) **AND** sodium bicarbonate    prochlorperazine    Assessment/Plan:  Principal Problem:    Nosocomial pneumonia  Active Problems:    Urinary tract infection associated with cystostomy catheter, initial encounter ([facility])    Hyponatremia    Thrush, oral     54 year old male with PMH sig for 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     Sepsis POA 2/2 CAUTI +/- RML PNA  - given hx of MDRO, started on meropenem  - await Urine cx, proteus mirabilis,   - PNA work up negative, likely all urine infection  - d/w ID    R distal ureter stone with hydroureter  - urology consulted  - s/p cystoscopy and stent placement 7/27  - monitor for postobstructive diureis  - cont IVF     Oral thrush  - fluconazole per ID     Acute Renal Injury - resolved  - prerenal, infection  - cont isotonic crystalloids     CVA   - resulting in R hemiparesis, nonverbal     Suprapubic cath  - routine cares     G tube  - routine cares     Essential HTN  - coreg, amlodipine, hydralazine     Hyperlipidemia  - statin    Remote hx of LLE DVT in 2023   - on xarelto   - certainly high risk of VTE, but given hx of hemorrhagic stroke the dosing should probably be revisited by PCP     FEN: Tfs, PT/OT  Proph: SCDs, xarelto    Dispo - possible dc tomorrow pending final urine cx       Fanny Mcneil MD  [facility]  [facility]  Hospitalist  Message over Meet You/[employer]/Alert[employer]  Pager: [phone]

## 2024-07-28 NOTE — SLP NOTE
ADULT SWALLOWING EVALUATION    ASSESSMENT    PERTINENT BACKGROUND INFORMATION:  Patient is a 54 year-old male who was admitted on 7/25/24 with Hyponatremia [E87.1], Nosocomial pneumonia [J18.9, Y95], Thrush, oral [B37.0], Urinary tract infection associated with cystostomy catheter, initial encounter ([facility]) [T83.510A, N39.0].  Imaging results reviewed and noted below.  Currently on  RA L NC with adequate SPO2 saturations of 94%. Hospital course complicated by emesis of jevity feedings per RN report.  PMH is significant for stroke with R side hemiparesis, aphasia, h/o dysphagia with G-tube which may impact care. Patient resides at [facility].   Prior to this hospitalization, patient was consuming a Mechanical soft chopped/ Soft & Bite Sized diet with thin liquids and jevity feedings  Patient is familiar with this ST department; see ST history described below.  Patient is currently NPO with jevity feedings via PEG.     ASSESSMENT:   Patient was awake and alert.  He was limited to describing his dysphagia history due to aphasia, however stated \"yes\" when asked if he receives 3 meals/day at SNF and stated \"yes\" when asked if he generally eats >50% of meals.  Able to maintain midline position with adequate trunk and head control when upright in bed. Oral motor mechanism exam was remarkable for R facial droop and reduced labial ROM. Can follow simple commands, however, but difficulty following complex commands. Suspect oral apraxia may be present.  Excessive saliva production noted and Yankuer present. He is being treated for thrush currently. Patient presented with WFL oropharyngeal skills with absent signs/symptoms of aspiration during the controlled conditions of this exam. Mastication was timely with no evidence of oral residuals. Please see below objective section for details.  Further study of swallow mechanism is recommended due to current CXR and h/o CVA with PEG.  Difficulty to completely r/o aspiration via  bedside clinical evaluation. Recommend dietary consult to hopefully wean from tube feedings. VFSS for Videoflouroscopic swallow study (VFSS) suggested to assess oropharyngeal swallow function, r/o aspiration, assess compensatory strategies to optimize safe swallowing and recommend safest, most least restrictive diet.       EDUCATION:  - Patient verbalized understanding to results and recommendations of this evaluation and was in agreement via \"yes/no\" responses.  No family present.  - Spoke with CHAD Phillips.        RECOMMENDATIONS/PLAN OF CARE   - Recommend modified diet with implementation of  swallow support strategies outlined below.   Diet Recommendations - Solids: Soft/ Easy to chew  Diet Recommendations - Liquids: Thin Liquids  Compensatory Strategies Recommended: Alternate consistencies  Aspiration Precautions: Upright position;Slow rate;Small bites and sips; straws okay  Medication Administration Recommendations: One pill at a time;Whole in puree    Treatment Plan/Recommendations: Aspiration precautions;Videofluoroscopic swallow study  - Further assessment via videoflouroscopic swallow study (VFSS) warranted to obtain objective measures, determine severity, recommend safest/LRD and safe swallow support strategies, and determine POC.  - Speech therapy services are recommended for diet texture analysis of modified diet and patient tolerance; monitor swallowing and airway patency with patient fatigue and changes in neurological status, and compensatory swallow support strategy practice to improve safety, independence for return to previous level of function and least restrictive diet  -  Patient/family education and training in utilization of compensatory techniques to minimize risk of aspiration  - Oral care 2-3x/day.  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[facility])    Deep vein thrombosis (HCC)    Dysphasia    Esophageal reflux    Essential hypertension    Feeding by G-tube (HCC)    Hemiparesis (HCC)    High blood pressure    Problems with swallowing    feeding tube    Pulmonary embolism (HCC)    Renal disorder    Right hemiplegia ([facility])    Stroke ([facility])    Visual impairment       Past Surgical History:   Procedure Laterality Date    Cystourethroscopy  12/01/2022    bilateral retrograde pyelograms         Prior Living Situation: Skilled nursing facility ([facility])  Diet Prior to Admission: Mechanical soft chopped/ Soft & Bite Sized (Milk at all meals, juice L/D upright position during and 30 min after meals with supervision, small/single bites, alt food.liq, double swallow, nursing shall monitor pulmonary status. Enteral nutrition after meals.)    Precautions: Aspiration    Patient/Family Goals: not stated at this time/unable to state due to aphasia    SWALLOWING HISTORY  Current Diet Consistency: NPO  Dysphagia History:   04/25/2023 therapy note by Sarah Beth Diaz, SLP: Observed patient feeding himself sips of thin liquids via straw. Independent utilization of small, single sips and good tolerance observed. No overt clinical s/s of aspiration. Pt currently tolerating safest/least restrictive diet recommended. No further services warranted. Will d/c from services at this time.  Diet Recommendations - Solids: Soft/ Easy to chew  Diet Recommendations - Liquids: Thin Liquids  Compensatory Strategies Recommended: Slow rate;Alternate consistencies;Small bites and sips (straws ok but control for small, single sips)  Aspiration Precautions: Upright position;Slow rate;Small  bites and sips  Medication Administration Recommendations: Non-oral (crushed and give in pureed if desired by MD)    04/23/2023 CSE by Sarah Beth Diaz, SLP:  Oral with probable pharyngeal dysphagia - no overt clinical s/s of aspiration with given po trials. Per chart review, pt has been tolerating both enteral and po pleasure feedings at LTC facility without respiratory issues.   Diet Recommendations - Solids: Puree (pleasure feeds) - advance as tolerated  Diet Recommendations - Liquids: Thin Liquids (pleasure feeds)  Compensatory Strategies Recommended: Slow rate;Alternate consistencies;Small bites and sips (straws ok but control for small, single sips)  Aspiration Precautions: Upright position;Slow rate;Small bites and sips  Medication Administration Recommendations: Non-oral (crushed and give in pureed if desired by MD)  Discharge Recommendations/Plan: Skilled nursing facility    02/16/23 VFSS by Lakeisha Mitchell, SLP: Videofluoroscopic swallow study was completed in conjunction with the radiologist.  Patient was assessed with thin liquids, mildly thick liquids, and puree consistencies by spoon. Patient demonstrated a mildly impaired oral phase and mildly impaired pharyngeal phase of the swallow with trace transient laryngeal penetration of thin liquids during the controlled conditions of this exam. Laryngeal penetration occurred  with thin consistencies without a response to the penetrated material.   Diet Recommendations - Solids: NPO (pleasure feeds of puree)  Diet Recommendations - Liquids: NPO (pleasure feeds of thin by spoon)  Follow Up Needed (Documentation Required): Yes  SLP Follow-up Date: 02/17/23  Compensatory Strategies Recommended: Liquids via spoon;Slow rate;Small bites and sips (pleasure only)  Aspiration Precautions: Upright position;Slow rate;Small bites and sips;No straw;Cueing to swallow  Medication Administration Recommendations: Non-oral  Treatment Plan/Recommendations: Dysphagia  therapy;Aspiration precautions    2023 VFSS by Kaylin Michelle, SLP: Pt presented with severe oropharyngeal dysphagia characterized by absent swallow response. Pt required sensory feedback (empty spoon) to help initiate swallow response. Pt is at risk for aspiration before, during, and after the swallow. No thicker and solid trials completed given severity of swallow response. Recommend NPO with ice/chips and small sips of water for pleasure after oral care and when immune status is not compromised. Pt would benefit from continued dysphagia therapy.   Diet Recommendations - Solids: NPO  Diet Recommendations - Liquids: NPO (Ice chips/water for pleasure with utilization of strict precautions)  Follow Up Needed (Documentation Required): Yes  SLP Follow-up Date: 23  Compensatory Strategies Recommended: Slow rate;Liquids via spoon  Aspiration Precautions: Upright position;Slow rate;Small bites and sips;Cueing to swallow;No straw  Medication Administration Recommendations: Non-oral  Treatment Plan/Recommendations: Dysphagia therapy      IMAGING   XR CHEST AP PORTABLE   CONCLUSION:  Mild increased opacity in the right middle lobe may represent infiltrate versus atelectasis.  Correlate clinically..       LOCATION:  [facility]     Dictated by (CST): Sarmad Washington MD on 2024 at 11:13 PM       Finalized by (CST): Sarmad Washington MD on 2024 at 11:14 PM       SUBJECTIVE   Pt awake, alert, aphasic. Difficulty expressing his history. Able to state name,  clearly.      OBJECTIVE   ORAL MOTOR EXAMINATION  Dentition: Natural;Functional  Symmetry: Reduced right facial  Strength: Unable to assess     Range of Motion: Within Functional Limits  Rate of Motion: Unable to assess    Voice Quality: Clear  Respiratory Status: Unlabored  Consistencies Trialed: Thin liquids;Puree;Hard solid  Method of Presentation: Self presentation;Staff/Clinician assistance;Spoon;Cup;Straw;Single sips  Patient Positioning:  Upright;Midline    Oral Phase of Swallow: Within Functional Limits                      Pharyngeal Phase of Swallow: Within Functional Limits           (Please note: Silent aspiration cannot be evaluated clinically. Videofluoroscopic Swallow Study is required to rule-out silent aspiration.)       Comments:               GOALS  Goal #1 The patient will tolerate SOFT, advanced consistency and thin liquids without overt signs or symptoms of aspiration with 90 % accuracy over 1-2 session(s).  Goal Established         Goal #2 The patient/family/caregiver will demonstrate understanding and implementation of aspiration precautions and swallow strategies independently over 1-2 session(s).    Goal Established         Goal #3 VFSS for further assessment due to CXR  Goal Established           FOLLOW UP  Treatment Plan/Recommendations: Aspiration precautions  Number of Visits to Meet Established Goals: 3  Follow Up Needed (Documentation Required): Yes  SLP Follow-up Date: 07/29/24    Thank you for your referral.   If you have any questions, please contact Khushboo URRUTIA SLP

## 2024-07-28 NOTE — PLAN OF CARE
Problem: Patient/Family Goals  Goal: Patient/Family Long Term Goal  Description: Patient's Long Term Goal: Go home    Interventions:  - IV Abx  - See additional Care Plan goals for specific interventions  Outcome: Progressing  Goal: Patient/Family Short Term Goal  Description: Patient's Short Term Goal:   7/26 AM: Tube feedings  07/26/2024 - control nausea  07/27/2024  - tolerate  gtube feeding    Interventions:   - monitor gastric residuals  - compazine and Zofran as ordered.   - HOB above 30 degrees.  Check residuals, monitor/medicate for nausea, gradually increase rate as tolerated to a goal of 70  -   - See additional Care Plan goals for specific interventions  Outcome: Progressing     Problem: RESPIRATORY - ADULT  Goal: Achieves optimal ventilation and oxygenation  Description: INTERVENTIONS:  - Assess for changes in respiratory status  - Assess for changes in mentation and behavior  - Position to facilitate oxygenation and minimize respiratory effort  - Oxygen supplementation based on oxygen saturation or ABGs  - Provide Smoking Cessation handout, if applicable  - Encourage broncho-pulmonary hygiene including cough, deep breathe, Incentive Spirometry  - Assess the need for suctioning and perform as needed  - Assess and instruct to report SOB or any respiratory difficulty  - Respiratory Therapy support as indicated  - Manage/alleviate anxiety  - Monitor for signs/symptoms of CO2 retention  Outcome: Progressing     Problem: GASTROINTESTINAL - ADULT  Goal: Minimal or absence of nausea and vomiting  Description: INTERVENTIONS:  - Maintain adequate hydration with IV or PO as ordered and tolerated  - Nasogastric tube to low intermittent suction as ordered  - Evaluate effectiveness of ordered antiemetic medications  - Provide nonpharmacologic comfort measures as appropriate  - Advance diet as tolerated, if ordered  - Obtain nutritional consult as needed  - Evaluate fluid balance  Outcome: Progressing  Goal:  Maintains adequate nutritional intake (undernourished)  Description: INTERVENTIONS:  - Monitor percentage of each meal consumed  - Identify factors contributing to decreased intake, treat as appropriate  - Assist with meals as needed  - Monitor I&O, WT and lab values  - Obtain nutritional consult as needed  - Optimize oral hygiene and moisture  - Encourage food from home; allow for food preferences  - Enhance eating environment  Outcome: Progressing     Problem: GENITOURINARY - ADULT  Goal: Absence of urinary retention  Description: INTERVENTIONS:  - Assess patient’s ability to void and empty bladder  - Monitor intake/output and perform bladder scan as needed  - Follow urinary retention protocol/standard of care  - Consider collaborating with pharmacy to review patient's medication profile  - Implement strategies to promote bladder emptying  Outcome: Progressing     Problem: METABOLIC/FLUID AND ELECTROLYTES - ADULT  Goal: Glucose maintained within prescribed range  Description: INTERVENTIONS:  - Monitor Blood Glucose as ordered  - Assess for signs and symptoms of hyperglycemia and hypoglycemia  - Administer ordered medications to maintain glucose within target range  - Assess barriers to adequate nutritional intake and initiate nutrition consult as needed  - Instruct patient on self management of diabetes  Outcome: Progressing     Problem: SKIN/TISSUE INTEGRITY - ADULT  Goal: Skin integrity remains intact  Description: INTERVENTIONS  - Assess and document risk factors for pressure ulcer development  - Assess and document skin integrity  - Monitor for areas of redness and/or skin breakdown  - Initiate interventions, skin care algorithm/standards of care as needed  Outcome: Progressing

## 2024-07-28 NOTE — PHYSICAL THERAPY NOTE
PT attempt: chart review done, per PT note 5/27/23 patient is a LTC nursing  home resident and a main lift for transfer, per OT patient is dependent during ADLs., No acute PT intervention indicated at this time.

## 2024-07-28 NOTE — PROGRESS NOTES
Postop day 1 from right ureteral stent.  The kidney was not very hydronephrotic at the time of stent placement.  Patient has no pain and is afebrile and white count is normal.  Please have patient follow-up as an outpatient with Dr. Castelan of urology.  Signing off.    Please re consult us if there are any further pressing issues.

## 2024-07-28 NOTE — PROGRESS NOTES
Patient s/p cysto.  Urine output from supra pubic catheter was blood tinged early this shift but has now cleared up.  Total output this shift = 2,920cc of yellow colored urine.  He complained of nausea only once - Zofran was given.  No emesis.  Tube feed rate now at 40cc/hour.  No stool. IVF and IV antibx as ordered.  Midnight accucheck = 136, 0600 accucheck = 164.

## 2024-07-28 NOTE — ANESTHESIA POSTPROCEDURE EVALUATION
[facility]    Jose Ruvalcaba Patient Status:  Inpatient   Age/Gender 54 year old male MRN [id number]   Location [facility] 5NW-A Attending Rick Mcneil*   Hosp Day # 1 PCP Rick Lawson MD       Anesthesia Post-op Note    CYSTOSCOPY. RETROGRADE PYELOGRAM,  INSERTION URETERAL STENT- RIGHT    Procedure Summary       Date: 07/27/24 Room / Location:  MAIN OR  /  MAIN OR    Anesthesia Start: 1724 Anesthesia Stop: 1810    Procedure: CYSTOSCOPY. RETROGRADE PYELOGRAM,  INSERTION URETERAL STENT- RIGHT (Right: Ureter) Diagnosis:       Hydronephrosis      (Hydronephrosis [N13.30])    Surgeons: Andrae Nation MD Anesthesiologist: Elba Arias MD    Anesthesia Type: general ASA Status: 3            Anesthesia Type: general    Vitals Value Taken Time   /78 07/27/24 1835   Temp 99.1 °F (37.3 °C) 07/27/24 1830   Pulse 79 07/27/24 1940   Resp 17 07/27/24 1840   SpO2 93 % 07/27/24 1938   Vitals shown include unfiled device data.    Patient Location: PACU    Anesthesia Type: general    Airway Patency: patent    Postop Pain Control: adequate    Mental Status: mildly sedated but able to meaningfully participate in the post-anesthesia evaluation    Nausea/Vomiting: none    Cardiopulmonary/Hydration status: stable euvolemic    Complications: no apparent anesthesia related complications    Postop vital signs: stable    Comments: Hand off to receiving nurse completed with essential components of patient's care reviewed, RN expressed comfort with assuming care, and all questions answered.     Dental Exam: Unchanged from Preop    Patient to be discharged from PACU when criteria met.

## 2024-07-29 ENCOUNTER — APPOINTMENT (OUTPATIENT)
Dept: GENERAL RADIOLOGY | Facility: HOSPITAL | Age: 54
End: 2024-07-29
Attending: HOSPITALIST
Payer: MEDICAID

## 2024-07-29 ENCOUNTER — APPOINTMENT (OUTPATIENT)
Dept: GENERAL RADIOLOGY | Facility: HOSPITAL | Age: 54
End: 2024-07-29
Attending: STUDENT IN AN ORGANIZED HEALTH CARE EDUCATION/TRAINING PROGRAM
Payer: MEDICAID

## 2024-07-29 LAB
ANION GAP SERPL CALC-SCNC: 6 MMOL/L (ref 0–18)
ATRIAL RATE: 80 BPM
BASOPHILS # BLD AUTO: 0.03 X10(3) UL (ref 0–0.2)
BASOPHILS NFR BLD AUTO: 0.3 %
BUN BLD-MCNC: 15 MG/DL (ref 9–23)
CALCIUM BLD-MCNC: 9.6 MG/DL (ref 8.7–10.4)
CHLORIDE SERPL-SCNC: 107 MMOL/L (ref 98–112)
CO2 SERPL-SCNC: 27 MMOL/L (ref 21–32)
CREAT BLD-MCNC: 0.91 MG/DL
EGFRCR SERPLBLD CKD-EPI 2021: 99 ML/MIN/1.73M2 (ref 60–?)
EOSINOPHIL # BLD AUTO: 0.18 X10(3) UL (ref 0–0.7)
EOSINOPHIL NFR BLD AUTO: 2 %
ERYTHROCYTE [DISTWIDTH] IN BLOOD BY AUTOMATED COUNT: 13.4 %
GLUCOSE BLD-MCNC: 101 MG/DL (ref 70–99)
GLUCOSE BLD-MCNC: 104 MG/DL (ref 70–99)
GLUCOSE BLD-MCNC: 107 MG/DL (ref 70–99)
GLUCOSE BLD-MCNC: 96 MG/DL (ref 70–99)
GLUCOSE BLD-MCNC: 98 MG/DL (ref 70–99)
HCT VFR BLD AUTO: 32.7 %
HGB BLD-MCNC: 10.6 G/DL
IMM GRANULOCYTES # BLD AUTO: 0.06 X10(3) UL (ref 0–1)
IMM GRANULOCYTES NFR BLD: 0.7 %
LYMPHOCYTES # BLD AUTO: 1.44 X10(3) UL (ref 1–4)
LYMPHOCYTES NFR BLD AUTO: 16.2 %
MAGNESIUM SERPL-MCNC: 1.2 MG/DL (ref 1.6–2.6)
MCH RBC QN AUTO: 29.4 PG (ref 26–34)
MCHC RBC AUTO-ENTMCNC: 32.4 G/DL (ref 31–37)
MCV RBC AUTO: 90.6 FL
MONOCYTES # BLD AUTO: 0.7 X10(3) UL (ref 0.1–1)
MONOCYTES NFR BLD AUTO: 7.9 %
NEUTROPHILS # BLD AUTO: 6.5 X10 (3) UL (ref 1.5–7.7)
NEUTROPHILS # BLD AUTO: 6.5 X10(3) UL (ref 1.5–7.7)
NEUTROPHILS NFR BLD AUTO: 72.9 %
OSMOLALITY SERPL CALC.SUM OF ELEC: 291 MOSM/KG (ref 275–295)
P AXIS: 38 DEGREES
P-R INTERVAL: 158 MS
PLATELET # BLD AUTO: 459 10(3)UL (ref 150–450)
POTASSIUM SERPL-SCNC: 3.6 MMOL/L (ref 3.5–5.1)
POTASSIUM SERPL-SCNC: 3.9 MMOL/L (ref 3.5–5.1)
POTASSIUM SERPL-SCNC: 4 MMOL/L (ref 3.5–5.1)
Q-T INTERVAL: 378 MS
QRS DURATION: 84 MS
QTC CALCULATION (BEZET): 435 MS
R AXIS: -14 DEGREES
RBC # BLD AUTO: 3.61 X10(6)UL
SODIUM SERPL-SCNC: 140 MMOL/L (ref 136–145)
T AXIS: 9 DEGREES
VENTRICULAR RATE: 80 BPM
WBC # BLD AUTO: 8.9 X10(3) UL (ref 4–11)

## 2024-07-29 PROCEDURE — 82962 GLUCOSE BLOOD TEST: CPT

## 2024-07-29 PROCEDURE — 83735 ASSAY OF MAGNESIUM: CPT | Performed by: UROLOGY

## 2024-07-29 PROCEDURE — 74018 RADEX ABDOMEN 1 VIEW: CPT | Performed by: STUDENT IN AN ORGANIZED HEALTH CARE EDUCATION/TRAINING PROGRAM

## 2024-07-29 PROCEDURE — 84132 ASSAY OF SERUM POTASSIUM: CPT | Performed by: STUDENT IN AN ORGANIZED HEALTH CARE EDUCATION/TRAINING PROGRAM

## 2024-07-29 PROCEDURE — 93005 ELECTROCARDIOGRAM TRACING: CPT

## 2024-07-29 PROCEDURE — 74230 X-RAY XM SWLNG FUNCJ C+: CPT | Performed by: HOSPITALIST

## 2024-07-29 PROCEDURE — 80048 BASIC METABOLIC PNL TOTAL CA: CPT | Performed by: UROLOGY

## 2024-07-29 PROCEDURE — 85025 COMPLETE CBC W/AUTO DIFF WBC: CPT | Performed by: UROLOGY

## 2024-07-29 PROCEDURE — 93010 ELECTROCARDIOGRAM REPORT: CPT | Performed by: INTERNAL MEDICINE

## 2024-07-29 PROCEDURE — 92611 MOTION FLUOROSCOPY/SWALLOW: CPT

## 2024-07-29 RX ORDER — METOCLOPRAMIDE HYDROCHLORIDE 5 MG/ML
10 INJECTION INTRAMUSCULAR; INTRAVENOUS EVERY 8 HOURS
Status: COMPLETED | OUTPATIENT
Start: 2024-07-29 | End: 2024-07-31

## 2024-07-29 RX ORDER — MAGNESIUM OXIDE 400 MG/1
800 TABLET ORAL ONCE
Status: COMPLETED | OUTPATIENT
Start: 2024-07-29 | End: 2024-07-29

## 2024-07-29 RX ORDER — POTASSIUM CHLORIDE 1.5 G/1.58G
40 POWDER, FOR SOLUTION ORAL EVERY 4 HOURS
Status: COMPLETED | OUTPATIENT
Start: 2024-07-29 | End: 2024-07-29

## 2024-07-29 NOTE — PROGRESS NOTES
[facility]  Hospitalist Progress Note                                                                     [facility]   part of [facility]        Jose Ruvalcaba  1/11/1970    SUBJECTIVE:  Patient seen and examined.  Has been having frequent episode of emesis.  Per RN, not associated with oral intake.  Worked with speech today.    OBJECTIVE:  Temp:  [96.7 °F (35.9 °C)-98.6 °F (37 °C)] 98.2 °F (36.8 °C)  Pulse:  [75-84] 80  Resp:  [16-18] 18  BP: (132-159)/() 132/85  SpO2:  [95 %-98 %] 95 %  Exam  Gen: No acute distress, alert, no focal neurologic deficits, nonverbal, follows commands, answers yes or no questions  Neuro: Right-sided hemiparesis, baseline  Pulm: Lungs clear bilaterally, normal respiratory effort  CV: Heart with regular rate and rhythm, no murmur.  Normal PMI.    Abd: Abdomen soft, nontender, nondistended, no organomegaly, bowel sounds present, suprapubic TTP   MSK: Full range of motion in extremities, no clubbing, no cyanosis  Skin: no rashes or lesions,  Neuro: R hemiparesis baseline from previous CVA, nonverbal at times, answers yes or no questions,    Labs:   Recent Labs   Lab 07/25/24 2233 07/26/24  1035 07/27/24  0902 07/28/24  0636 07/29/24  0611   WBC 13.1* 10.5 7.7 7.9 8.9   HGB 11.7* 10.8* 10.2* 10.5* 10.6*   MCV 87.7 91.6 90.2 90.5 90.6   .0 416.0 398.0 433.0 459.0*   INR 2.31*  --   --   --   --        Recent Labs   Lab 07/25/24 2233 07/26/24  1035 07/27/24  0902 07/28/24  0636 07/28/24  1633 07/29/24  0227 07/29/24  0611   * 137 140  140 142  --   --  140   K 4.2 4.2 3.5  3.5 3.4*  3.4* 3.4* 4.0 3.6    106 109  109 110  --   --  107   CO2 23.0 22.0 24.0  24.0 26.0  --   --  27.0   BUN 28* 31* 30*  30* 21  --   --  15   CREATSERUM 1.73* 1.70* 1.52*  1.52* 1.06  --   --  0.91   CA 9.2 9.4 9.0  9.0 9.3  --   --  9.6   MG  --  2.0 2.2 1.6  --   --  1.2*   * 100* 116*  116* 146*  --   --   98       Recent Labs   Lab 07/25/24  2233 07/27/24  0902   ALT 28 37   AST 28 42*   ALB 4.1 3.8       Recent Labs   Lab 07/28/24  1353 07/28/24  1646 07/28/24  2356 07/29/24  0843 07/29/24  1144   PGLU 118* 126* 105* 101* 107*       Meds:   Scheduled:    metoclopramide  10 mg Intravenous Q8H    lisinopril  20 mg Oral Daily    amLODIPine  10 mg Per G Tube QPM    ascorbic acid  500 mg Per G Tube Daily    atorvastatin  40 mg Per G Tube Nightly    carvedilol  25 mg Per G Tube BID with meals    docusate  100 mg Per G Tube BID    terazosin  10 mg Per G Tube Nightly    hydrALAZINE  100 mg Per G Tube TID    rivaroxaban  20 mg Per G Tube Daily    meropenem  500 mg Intravenous Q8H    buPROPion  100 mg Per G Tube TID    fluconazole  200 mg Per G Tube Daily     Continuous Infusions:    lactated ringers 125 mL/hr at 07/29/24 0647    dextrose 10%       PRN:   guaiFENesin    acetaminophen    ondansetron    dextrose 10%    lipase-protease-amylase (Lip-Prot-Amyl) **AND** sodium bicarbonate    Assessment/Plan:  Principal Problem:    Nosocomial pneumonia  Active Problems:    Urinary tract infection associated with cystostomy catheter, initial encounter ([facility])    Hyponatremia    Thrush, oral     54 year old male with PMH sig for 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     Sepsis POA 2/2 CAUTI +/- RML PNA  - given hx of MDRO, started on meropenem  -Urine cultures ESBL, proteus mirabilis,   - PNA work up negative, likely all urine infection  - d/w ID    R distal ureter stone with hydroureter  - urology consulted  - s/p cystoscopy and stent placement 7/27  - monitor for postobstructive diureis  - cont IVF     Nausea and vomiting  - X-ray obtained to rule out obstruction-nonspecific bowel gas pattern, has been having bowel movement as per RN  - CT from 7/26 reviewed  - EKG within normal  - Possible  gastroparesis, trial of Reglan    Oral thrush  - fluconazole per ID     Acute Renal Injury - resolved  - prerenal, infection  - cont isotonic crystalloids     CVA   - resulting in R hemiparesis, nonverbal     Suprapubic cath  - routine cares     G tube  - routine cares     Essential HTN  - coreg, amlodipine, hydralazine     Hyperlipidemia  - statin    Remote hx of LLE DVT in 2023   - on xarelto   - certainly high risk of VTE, but given hx of hemorrhagic stroke the dosing should probably be revisited by PCP     FEN: Tfs, PT/OT, diet per speech  Proph: SCDs, xarelto    Dispo - possible dc pending final urine cx       Pepe Cason,   Hospitalist  [facility]

## 2024-07-29 NOTE — PAYOR COMM NOTE
--------------  REQUEST FOR RECONSIDERATION    7/26 - 28 CONTINUED STAY REVIEW    Payor: [facility]  Subscriber #:  [id number]  Authorization Number: [id number]    7/26:     UROLOGY:    Reason for Consultation:  Stones       History of Present Illness:  Jose Ruvalcaba is a a(n) 54 year old male.      CVA    Multiple medical issues   Admit with pneumonia  Noted incidental stones      Neurogeic bladder -  Pt of Dr Castelan -- SPT -- changes  q 4 weeks      Imaging:     Moderate hydroureteronephrosis of the right collecting system secondary to an obstructing 10 x 7 mm calculus of the right distal ureter.  This lies within 2-3 cm from the UVJ.  Associated perivesicular and periureteral stranding suggesting a component of    cystitis and ascending UTI.  Recommend correlation with urinalysis.       Neurogenic Bladder   SPT  - change q  4 weeks   FU  Dr Castelan for SPT  change      2. CAUTI/  MDR  in past      Cont IV anti BX      3.    LLP  8 mm stone  K  4.    RUP   3 mm stone     5.  R distal ureter  10 mm stone with  Hydro   Need to await negative U  CX  prior to URS      Will SW pt as to JJ and Cysto      6.  TOBIN      7/27:    HOSPITALIST:    Patient seen and examined.  Feeling better (nodding).   Denies CP/SOB.  NAD.         OBJECTIVE:  Temp:  [97.8 °F (36.6 °C)-98.8 °F (37.1 °C)] 98.1 °F (36.7 °C)  Pulse:  [74-93] 74  Resp:  [16-20] 17  BP: (119-154)/(55-66) 131/59  SpO2:  [93 %-96 %] 96 %  Exam  Gen: No acute distress, alert and oriented x3, no focal neurologic deficits  Pulm: Lungs clear bilaterally, normal respiratory effort  CV: Heart with regular rate and rhythm, no murmur.  Normal PMI.    Abd: Abdomen soft, nontender, nondistended, no organomegaly, bowel sounds present, suprapubic TTP   MSK: Full range of motion in extremities, no clubbing, no cyanosis  Skin: no rashes or lesions, R hemiparesis     Lab 07/25/24  2233 07/26/24  1035 07/27/24  0902   WBC 13.1* 10.5 7.7   HGB 11.7*  10.8* 10.2*   MCV 87.7 91.6 90.2   .0 416.0 398.0   INR 2.31*  --   --       * 137 140  140   K 4.2 4.2 3.5  3.5    106 109  109   CO2 23.0 22.0 24.0  24.0   BUN 28* 31* 30*  30*   CREATSERUM 1.73* 1.70* 1.52*  1.52*   CA 9.2 9.4 9.0  9.0   MG  --  2.0 2.2   * 100* 116*  116*      Lab 07/25/24  2233 07/27/24  0902   ALT 28 37   AST 28 42*   ALB 4.1 3.8      Lab 07/27/24  0009 07/27/24  0541   PGLU 113* 127*         Meds:   Scheduled:   Scheduled Medications    amLODIPine  10 mg Per G Tube QPM    ascorbic acid  500 mg Per G Tube Daily    atorvastatin  40 mg Per G Tube Nightly    carvedilol  25 mg Per G Tube BID with meals    docusate  100 mg Per G Tube BID    terazosin  10 mg Per G Tube Nightly    hydrALAZINE  100 mg Per G Tube TID    rivaroxaban  20 mg Per G Tube Daily    meropenem  500 mg Intravenous Q8H    buPROPion  100 mg Per G Tube TID    fluconazole  200 mg Per G Tube Daily         Continuous Infusions:   Medication Infusions    sodium chloride 125 mL/hr at 07/26/24 0135    lactated ringers 125 mL/hr at 07/27/24 0156    dextrose 10%             Assessment/Plan:  Principal Problem:    Nosocomial pneumonia  Active Problems:    Urinary tract infection associated with cystostomy catheter, initial encounter ([facility])    Hyponatremia    Thrush, oral      54 year old male with PMH sig for 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     Sepsis POA 2/2 CAUTI +/- RML PNA  - given hx of MDRO, started on meropenem  - await urine cx  - obtain sputum cx if possible, check urine legionella/strep  - consult ID     R distal ureter stone with hydroureter  - urology consulted  - will need cysto and stent, timing TBD - OR today      Oral thrush  - fluconazole per ID     Acute Renal Injury  - prerenal, infection  - cont isotonic crystalloids     CVA   - resulting  in R hemiparesis, nonverbal     Suprapubic cath  - routine cares     G tube  - routine cares     Essential HTN  - coreg, amlodipine, hydralazine     Hyperlipidemia  - statin     Remote hx of LLE DVT in 2023   - on xarelto   - certainly high risk of VTE, but given hx of hemorrhagic stroke the dosing should probably be revisited by PCP     FEN: Tfs, PT/OT  Proph: SCDs, xarelto       ID:     Doing about the same.  No abdominal pain.  No nausea or vomiting.  No diarrhea.     The rest of the systems were reviewed and found to be negative except was mentioned above     Interval events: This is a 54-year-old male patient with history of stroke, currently bedbound with a suprapubic catheter, presents here with high fevers in the setting of a catheter associated UTI.  Patient had grown ESBL producing organisms along with Enterococcus faecalis in the past.  Currently on IV meropenem  CT now showing obstructing right-sided nephrolithiasis    I have reviewed all imagining data available independently.   CT abdomen pelvis:  Moderate hydronephrosis on the right side with a component of cystitis and right-sided ureteritis and pyelonephritis     Impression:  Jose Ruvalcaba is a 54 year old male with     Acute complicated right-sided pyelonephritis in the setting of obstructive nephrolithiasis  This is in the setting of a chronic suprapubic catheter  In the past, the patient had grown ESBL producing Proteus along with Enterococcus faecalis  Currently on IV meropenem  Urine culture with Proteus mirabilis  Obstructive right-sided nephrolithiasis  Urology following  Plan for cystoscopy and stent placement  TOBIN on CKD  Management as per the primary team  Antibiotics will be renally adjusted  Thrush  On p.o. fluconazole     Recommendations:    Continue IV meropenem  Patient should have his obstructive nephrolithiasis resolved for appropriate source control.  This should be done sooner rather than later given history of MDRO to avoid  prolonged courses of antibiotics  Continue p.o. fluconazole for total of 7 days  Continue to monitor daily labs for antibiotic toxicities  Further recommendations will depend on the above work-up and clinical progress     UROLOGY:    Operative Note           Preoperative Diagnosis: Hydronephrosis [N13.30]      Postoperative Diagnosis: Hydronephrosis [N13.30]      Procedure(s):  CYSTOSCOPY. RETROGRADE PYELOGRAM,  INSERTION URETERAL STENT- RIGHT     Drains: 6  x  26  and SPT        7/28:    ID:    Interval events: This is a 54-year-old male patient with history of stroke, currently bedbound with a suprapubic catheter, presents here with high fevers in the setting of a catheter associated UTI.  Patient had grown ESBL producing organisms along with Enterococcus faecalis in the past.  Currently on IV meropenem, currently culture growing Proteus mirabilis, susceptibilities pending  CT now showing obstructing right-sided nephrolithiasis, status post cystoscopy and stenting on 7/27/2024    Vitals:     07/28/24 0830   BP: (!) 163/83   Pulse: 83   Resp: 19   Temp: 97.8 °F (36.6 °C)       Lab 07/25/24  2233 07/26/24  1035 07/27/24  0902 07/28/24  0636   WBC 13.1* 10.5 7.7 7.9   HGB 11.7* 10.8* 10.2* 10.5*   MCV 87.7 91.6 90.2 90.5   .0 416.0 398.0 433.0   INR 2.31*  --   --   --       * 137 140  140 142   K 4.2 4.2 3.5  3.5 3.4*  3.4*    106 109  109 110   CO2 23.0 22.0 24.0  24.0 26.0   BUN 28* 31* 30*  30* 21   CREATSERUM 1.73* 1.70* 1.52*  1.52* 1.06   CA 9.2 9.4 9.0  9.0 9.3   MG  --  2.0 2.2 1.6   * 100* 116*  116* 146*        Scheduled Medications    potassium chloride  40 mEq Oral Q4H    amLODIPine  10 mg Per G Tube QPM    ascorbic acid  500 mg Per G Tube Daily    atorvastatin  40 mg Per G Tube Nightly    carvedilol  25 mg Per G Tube BID with meals    docusate  100 mg Per G Tube BID    terazosin  10 mg Per G Tube Nightly    hydrALAZINE  100 mg Per G Tube TID    rivaroxaban  20 mg Per G  Tube Daily    meropenem  500 mg Intravenous Q8H    buPROPion  100 mg Per G Tube TID    fluconazole  200 mg Per G Tube Daily         Continuous Infusions:   Medication Infusions[]Expand by Default    sodium chloride 125 mL/hr at 07/26/24 0135    lactated ringers 125 mL/hr at 07/28/24 0627    dextrose 10%          Impression:  Jose Ruvalcaba is a 54 year old male with     Acute complicated right-sided pyelonephritis in the setting of obstructive nephrolithiasis  This is in the setting of a chronic suprapubic catheter  In the past, the patient had grown ESBL producing Proteus along with Enterococcus faecalis  Currently on IV meropenem  Urine culture with Proteus mirabilis, susceptibilities pending  Obstructive right-sided nephrolithiasis  Urology following  Status post cystoscopy and stent placement on 7/27/2024  TOBIN on CKD  Management as per the primary team  Antibiotics will be renally adjusted  Improving  Thrush  On p.o. fluconazole     Recommendations:    Continue IV meropenem  Continue to follow-up on urine cultures for final susceptibilities, final antibiotic selection will depend on susceptibilities  Continue p.o. fluconazole for total of 7 days  Continue to monitor daily labs for antibiotic toxicities  Further recommendations will depend on the above work-up and clinical progress     HOSPITALIST:    Sepsis POA 2/2 CAUTI +/- RML PNA  - given hx of MDRO, started on meropenem  - await Urine cx, proteus mirabilis,   - PNA work up negative, likely all urine infection  - d/w ID     R distal ureter stone with hydroureter  - urology consulted  - s/p cystoscopy and stent placement 7/27  - monitor for postobstructive diureis  - cont IVF    Dispo - possible dc tomorrow pending final urine cx        MEDICATIONS ADMINISTERED IN LAST 1 DAY:    fluconazole (Diflucan) tab 200 mg       Date Action Dose Route User    7/28/2024 2216 Given 200 mg Per G Tube Vidya Milan, RN       lactated ringers infusion 125/hr      Date  Action Dose Route User    7/29/2024 0647 New Bag (none) Intravenous Vidya Milan RN    7/28/2024 2345 New Bag (none) Intravenous Vidya Milan RN    7/28/2024 1616 New Bag (none) Intravenous Jacqueline Doran, CHAD       meropenem (Merrem) 500 mg in sodium chloride 0.9% 100 mL IVPB-MBP       Date Action Dose Route User    7/29/2024 0558 New Bag 500 mg Intravenous Vidya Milan RN    7/28/2024 2216 New Bag 500 mg Intravenous Vidya Milan RN    7/28/2024 1343 New Bag 500 mg Intravenous Jacqueline Doran, RN          ondansetron (Zofran) 4 MG/2ML injection 4 mg       Date Action Dose Route User    7/29/2024 0851 Given 4 mg Intravenous Jacqueline Doran, RN

## 2024-07-29 NOTE — DIETARY NOTE
[facility]   part of [facility]   CLINICAL NUTRITION    Received call from RN inquiring about TF regimen. Per her report, pt has not been tolerating continuous TF with N/V daily. Only got up to rate of 40 ml/hr (goal of 70) and then has been off since diet was advanced by SLP yesterday. Pt ate ~50% of meals on average but she is concerned that pt will likely not drink enough fluids. Discussed holding TF for now but continuing FWF and starting calorie count. Pt was on bolus TF PTA depending on how he was eating but bolus is not appropriate if pt is still having N/V and not tolerating continuous TF. Would consider adding promotility agent. Will continue to monitor and follow up as appropriate.    Oliva Fierro RD, LDN, [facility]  Clinical Dietitian  Spectra: [phone]

## 2024-07-29 NOTE — SLP NOTE
ADULT VIDEOFLUOROSCOPIC SWALLOWING STUDY    Admission Date: 7/25/2024  Evaluation Date: 07/29/24  Radiologist: Dr. Hurtado    RECOMMENDATIONS   Diet Recommendations - Liquids: Thin  Diet Recommendations - Solids: Soft/ Easy to chew  Supervision with meals  Assist when needed but avoid cues/instruction due to pt's apraxia  Calorie count to assess if adequate nutrition/hydration needs can be met strictly po     Further Follow-up:  Follow Up Needed (Documentation Required): Yes  SLP Follow-up Date: 07/30/24  Compensatory Strategies Recommended: No straws;Slow rate;Alternate consistencies;Small bites and sips;Extra sauce/gravy  Aspiration Precautions: Upright position;Slow rate;Small bites and sips;No straw  Medication Administration Recommendations: One pill at a time (take whole or crushed in pureed if any difficulty)  Treatment Plan/Recommendations: Dysphagia therapy;Aspiration precautions    HISTORY   Background/Objective Information:    Problem List  Principal Problem:    Nosocomial pneumonia  Active Problems:    Urinary tract infection associated with cystostomy catheter, initial encounter ([facility])    Hyponatremia    Thrush, oral      Past Medical History  Past Medical History:    Aphasia    Calculus of kidney    Congenital anomaly of heart (HCC)    Deep vein thrombosis (HCC)    Dysphasia    Esophageal reflux    Essential hypertension    Feeding by G-tube (HCC)    Hemiparesis (HCC)    High blood pressure    Problems with swallowing    feeding tube    Pulmonary embolism (HCC)    Renal disorder    Right hemiplegia (HCC)    Stroke ([facility])    Visual impairment       Current Diet Consistency: Soft/ Easy to Chew;Thin liquids  Prior Level of Function: Assistance/Support for ADL's  Prior Living Situation: Skilled nursing facility ([facility])  History of Recent: No recent respiratory difficulty  Precautions: Aspiration  Imaging results: CXR 7/25/24  CONCLUSION:  Mild increased opacity in the right middle lobe  may represent infiltrate versus atelectasis.  Correlate clinically..   Reason for Referral: R/O aspiration      Family/Patient Goals:  Safest/least restrictive diet     ASSESSMENT   DYSPHAGIA ASSESSMENT  Test completed in conjunction with Radiologist.  Patient Positioned: Upright;Midline.  Patient Viewed: Lateral.  Patient Alertness: Fully alert.  Consistencies Presented: Thin liquids;Puree;Soft solid to assess oropharyngeal swallow function and assess for compensatory strategies to improve safe swallow function.    THIN LIQUIDS  Method of Presentation: Teaspoon;Cup  Oral Phase of Swallow (VFSS - Thin Liquids): Impaired  Bolus Retrieval (VFSS - Thin Liquids): Intact  Bilabial Seal (VFSS - Thin Liquids): Intact  Bolus Formation (VFSS - Thin Liquids): Impaired  Bolus Propulsion (VFSS - Thin Liquids): Impaired  Mastication (VFSS - Thin Liquids):  (n/a)  Triggered at: Base of tongue  Delay (seconds): none  Residue Severity, Location: Mild;Valleculae;Pyriform sinuses;Trace  Laryngeal Penetration: None  Tracheal Aspiration: None        PUREE  Oral Phase of Swallow (VFSS - Puree): Impaired  Bolus Retrieval (VFSS - Puree): Intact  Bilabial Seal (VFSS - Puree): Intact  Bolus Formation (VFSS - Puree): Impaired  Bolus Propulsion (VFSS - Puree): Impaired  Mastication (VFSS - Puree): Impaired  Triggered at: Pyriform sinuses  Delay (seconds): fluctuating  Residue Severity, Location: Mild;Valleculae;Pyriform sinuses  Laryngeal Penetration: None  Tracheal Aspiration: None  SOFT SOLID  Oral Phase of Swallow (VFSS - Soft Solid): Impaired  Bolus Retrieval (VFSS - Soft Solid): Intact  Bilabial Seal (VFSS - Soft Solid): Intact  Bolus Formation (VFSS - Soft Solid): Impaired  Bolus Propulsion (VFSS - Soft Solid): Impaired  Mastication (VFSS - Soft Solid): Impaired  Premature Spillage to: Pyriform sinuses  Delay (seconds): fluctuating  Residue Severity, Location: Mild;Valleculae;Pyriform sinuses  Laryngeal Penetration: None  Tracheal  Aspiration: None     Penetration Aspiration Scale Score: Score 1: Material does not enter airway       Overall Impression:   Pt transported via mammography chair; alert but limited verbal output; able to follow simple commands but difficulty with lengthier/more complex. Head of chair positioned upright to 90 degrees and pt fed po trials of thin via spoon & cup, pureed and soft solid consistencies. Results of exam revealed moderate oral/mild pharyngeal dysphagia characterized by holding of bolus in oral cavity up to 50 seconds with thin liquids and 30 seconds with pureed and soft solid prior to initiation of mastication and oral transit with apraxia suspected. Premature spillage of pureed and soft solids to valleculae and at times seeping to the pyriform sinus noted prior to trigger of pharyngeal response. No laryngeal penetration or aspiration noted. Trace to mild vallecular and pyriform sinus residue observed post swallow due to reduced base of tongue retraction/hyolaryngeal elevation. Appropriate epiglottic inversion with recoil and adequate laryngeal closure noted.     Following exam, discussed results, aspiration risks, diet recommendations and diet recommendations with patient in basic terms. Patient nodded head in response however question his level of understanding. Informed RN of results and recommendations. Will continue to follow as per plan.               GOALS  Goal #1 The patient will tolerate soft/easy to chew consistency and thin liquids without overt signs or symptoms of aspiration with 95 % accuracy over 1 session(s).  New goal   Goal #2 The patient/family/caregiver will demonstrate understanding and implementation of aspiration precautions and swallow strategies independently over 1 session(s).    New goal   Goal #3 The patient will utilize compensatory strategies as outlined by  VFSS including Slow rate, Small bites, Small sips, Alternate liquids/solids, No straws, Upright 90 degrees, Supervision  with meals with as needed assistance 100 % of the time across 2 sessions.  New goal                                  EDUCATION/INSTRUCTION  Reviewed results and recommendations with patient/family/caregiver.  Agreement/Understanding verbalized and all questions answered to their apparent satisfaction.    INTERDISCIPLINARY COMMUNICATION  Reviewed results with Radiologist; agreement verbalized.    Patient Experiencing Pain: No                FOLLOW UP  Treatment Plan/Recommendations: Dysphagia therapy;Aspiration precautions  Number of Visits to Meet Established Goals: 2      Thank you for your referral.   If you have any questions, please contact Sarah Beth GUZMAN, SLP    Sarah Beth Diaz MA, CCC-SLP  Pager [phone]

## 2024-07-29 NOTE — PROGRESS NOTES
Patient A&OX3.  Communicating better with staff.  Able to take his pill orally - crushed and mixed in applesauce per his request.  Denies pain/discomfort.  Denies nausea, no vomiting, no stool.  Suprapubic draining good amounts of urine.  Afebrile.

## 2024-07-29 NOTE — CM/SW NOTE
Sent updates to [facility] in aidin.    SW/CM to remain available for support and/or discharge planning.    EDGARD Tolliver  Discharge Planner  [phone]

## 2024-07-29 NOTE — PROGRESS NOTES
Infectious Disease Progress Note      Date of admission: 7/25/2024  9:57 PM     Reason for consult: Catheter associated UTI, pyelonephritis    Subjective: Doing about the same.  No abdominal pain.  No nausea or vomiting.  No diarrhea.    The rest of the systems were reviewed and found to be negative except was mentioned above    Interval events: This is a 54-year-old male patient with history of stroke, currently bedbound with a suprapubic catheter, presents here with high fevers in the setting of a catheter associated UTI.  Patient had grown ESBL producing organisms along with Enterococcus faecalis in the past.  Currently on IV meropenem, currently culture growing Proteus mirabilis, susceptibilities pending  CT now showing obstructing right-sided nephrolithiasis, status post cystoscopy and stenting on 7/27/2024    Medications:    lisinopril    guaiFENesin    acetaminophen    amLODIPine    ascorbic acid    atorvastatin    carvedilol    docusate    terazosin    hydrALAZINE    rivaroxaban    meropenem    buPROPion    ondansetron    lactated ringers    dextrose 10%    lipase-protease-amylase (Lip-Prot-Amyl) **AND** sodium bicarbonate    fluconazole    prochlorperazine     Allergies:  Allergies   Allergen Reactions    Augmentin [Amoxicillin-Pot Clavulanate] UNKNOWN     Per NH records; tolerates cephalosporins    Penicillins RASH     Per fiance at this time. Tolerates cephalosporins       Physical Exam:  Vitals:    07/29/24 1105   BP: (!) 147/100   Pulse: 79   Resp: 16   Temp: 98.2 °F (36.8 °C)     Vitals signs and nursing note reviewed.   Constitutional:       Appearance: Normal appearance.   HENT:      Head: Normocephalic and atraumatic.      Mouth: Mucous membranes are moist.   Neck:      Musculoskeletal: Neck supple.   Cardiovascular:      Rate and Rhythm: Normal rate.   Pulmonary:      Effort: Pulmonary effort is normal. No respiratory distress.   Musculoskeletal:     Right lower leg: No edema.      Left lower  leg: No edema.   Skin:     General: Skin is warm and dry.     Laboratory data:  I have reviewed all the lab results independently.  Lab Results   Component Value Date    WBC 8.9 07/29/2024    HGB 10.6 07/29/2024    HCT 32.7 07/29/2024    .0 07/29/2024    CREATSERUM 0.91 07/29/2024    BUN 15 07/29/2024     07/29/2024    K 3.6 07/29/2024     07/29/2024    CO2 27.0 07/29/2024    GLU 98 07/29/2024    CA 9.6 07/29/2024    MG 1.2 07/29/2024      Recent Labs   Lab 07/29/24  0611   RBC 3.61*   HGB 10.6*   HCT 32.7*   MCV 90.6   MCH 29.4   MCHC 32.4   RDW 13.4   NEPRELIM 6.50   WBC 8.9   .0*      Microbiology data:  Hospital Encounter on 07/25/24   1. Blood Culture     Status: None (Preliminary result)    Collection Time: 07/25/24 10:34 PM    Specimen: Blood,peripheral   Result Value Ref Range    Blood Culture Result No Growth 3 Days N/A   2. Urine Culture, Routine     Status: Abnormal    Collection Time: 07/25/24 10:33 PM    Specimen: Urine, clean catch   Result Value Ref Range    Urine Culture 10,000 - 50,000 CFU/ML Proteus mirabilis ESBL Pos (A) N/A       Susceptibility    Proteus mirabilis ESBL Pos -  (no method available)     Cefazolin 8 Sensitive      Cefepime  Resistant      Ceftazidime  Resistant      Ceftriaxone <=1 Sensitive      Ciprofloxacin >=4 Resistant      Gentamicin <=1 Sensitive      Meropenem <=0.25 Sensitive      Levofloxacin >=8 Resistant      Nitrofurantoin 128 Resistant      Trimethoprim/Sulfa 40 Sensitive         Radiology:  I have reviewed all imagining data available independently.   CT abdomen pelvis:  Moderate hydronephrosis on the right side with a component of cystitis and right-sided ureteritis and pyelonephritis    Impression:  Jose Ruvalcaba is a 54 year old male with    Acute complicated right-sided pyelonephritis in the setting of obstructive nephrolithiasis  This is in the setting of a chronic suprapubic catheter  In the past, the patient had grown ESBL producing  Proteus along with Enterococcus faecalis  Currently on IV meropenem  Urine culture with ESBL producing Proteus mirabilis, susceptible to Bactrim  Obstructive right-sided nephrolithiasis  Urology following  Status post cystoscopy and stent placement on 7/27/2024  TOBIN on CKD  Management as per the primary team  Antibiotics will be renally adjusted  Improving  Thrush  On p.o. fluconazole    Recommendations:    Continue IV meropenem while inpatient  Plan to discharge patient on Bactrim DS 1 tablet twice a day to finish a total of 10 days from 7/27, that is through 8/5/24 when ready  Continue p.o. fluconazole for total of 7 days through 7/31/2024  Continue to monitor daily labs for antibiotic toxicities  Further recommendations will depend on the above work-up and clinical progress     The plan of care was discussed with the primary hospital team, Pepe Cason DO     Recommendations were also discussed with the patient; all questions were answered.     Thank you for this consultation. Please don't hesitate to call the ID team for questions or any acute changes in patient's clinical condition.    Please note that this report has been produced using speech recognition software and may contain errors related to that system including, but not limited to, errors in grammar, punctuation, and spelling, as well as words and phrases that possibly may have been recognized inappropriately.  If there are any questions or concerns, contact the dictating provider for clarification.    The 21st Century Cures Act makes medical notes like these available to patients in the interest of transparency. Please be advised this is a medical document. Medical documents are intended to carry relevant information, facts as evident, and the clinical opinion of the practitioner. The medical note is intended as peer to peer communication and may appear blunt or direct. It is written in medical language and may contain abbreviations or verbiage  that are unfamiliar.     Gaston Puentes MD  [facility]. Tel: [phone]. Fax: [phone].     Jose Ruvalcaba : 1970 MRN: [id number] CSN: [id number]

## 2024-07-29 NOTE — DISCHARGE INSTRUCTIONS
At this time, recommend holding TF unless pt refusing PO intake     To keep Gtube patent, recommend giving meds via Gtube or minimal FWF 30 ml q4hrs.     Diet Recommendations - Solids: Soft/ Easy to chew  Diet Recommendations - Liquids: Thin Liquids     Compensatory Strategies Recommended: No straws;Slow rate;Alternate consistencies;Small bites and sips;Extra sauce/gravy  Aspiration Precautions: Slow rate;Upright position;Small bites and sips;No straw    Having a Ureteral Stent  A ureteral stent is a soft plastic tube with holes in it. It’s temporarily inserted into a ureter to help drain urine into the bladder. One end goes in the kidney. The other end goes in the bladder. A coil on each end holds the stent in place. The stent can’t be seen from outside the body. It shouldn’t interfere with your normal routine. Your stent will be put in by a doctor trained in treating the urinary tract (a urologist) or another specialist. The procedure is done in a hospital or surgery center. You’ll likely go home the same day.   When is a ureteral stent used?  A ureteral stent may be used:  To bypass a blockage in a kidney or ureter.  During kidney stone removal.  To let a ureter heal after surgery.    Before the procedure  Your healthcare provider will give you instructions to prepare for the procedure. X-rays or other imaging tests of your kidneys and ureters may be done beforehand.   During the procedure  You receive medicine to prevent pain and help you relax or sleep during the procedure. Once this takes effect, the procedure starts.  The doctor inserts a cystoscope (lighted instrument) through the urethra and into the bladder. This shows the opening to the ureter.  A thin wire is carefully threaded through the cystoscope, up the ureter, and into the kidney. The stent is inserted over the wire.  A fluoroscope (special X-ray machine) is used to help position the stent. When the stent is in place, the wire and cystoscope are  removed.     While you have a stent  Some discomfort is normal. Certain movements may trigger pain or a feeling that you need to urinate. You may also feel mild soreness or pressure before or during urination. These symptoms will go away a few days after the stent is removed.  Medicine to control pain or bladder spasms or to prevent infection may be prescribed. Take this as directed.  Drink plenty of fluids to help flush out your urinary tract.  Your urine may be slightly pink or red. This is due to bleeding caused by minor irritation from the stent. This may happen on and off while you have the stent.  As with any synthetic device placed in the body, there is a risk of infection. The stent may have to be removed if this happens.      How long will you need a stent?  The stent is often taken out after the blockage in the ureter is treated or the ureter has healed. This may take 1 week to 2 weeks, or longer. If a stent is needed for a long time, it may need to be changed every few months.   When to call your healthcare provider  Contact your healthcare provider right away if:  Your urine contains blood clots or you see a large amount of blood-tinged urine  You have symptoms similar to those you had before the stent was placed  You constantly leak urine  You have a fever over 100.4°F (38°C), or as advised by your health care provider  You have chills  You experience nausea or vomiting  Your pain is not relieved with medicine  The end of the stent comes out of the urethra  You experience new or worsening symptoms  Caleb last reviewed this educational content on 4/1/2020  © 7353-1210 [employer]. [address] [place] [address], [place] [address]. All rights reserved. This information is not intended as a substitute for professional medical care. Always follow your healthcare professional's instructions.  ----------  Doctor is recommending a surgery called a URETEROSCOPY    You'll receive a call from our surgery  schedulers to set up a date, time and location and review what testing or appointments you may need prior to surgery.  If you do not hear from someone in the next day or two, please call [phone] and ask to speak to a surgery scheduler.    Please write in the date, time and location of your surgery:  ___________________________________________________________________________  What is this surgery?  A Ureteroscopy (URS) is surgical procedure which allows physicians to examine the ureter (the tube leading from the kidney to the bladder).     What is the purpose?  The URS may be done to evaluate the cause of hematuria, recurrent urinary tract infections, atypical urine results, or acute hydronephrosis. This procedure is often used to remove stones that have become lodged in the ureter or kidney. It is helpful in diagnosing ureteral cancer or a ureteral stricture (abnormal narrowing of the ureter).    How It Is Done     You will be placed under general anesthesia. The physician will pass an ureteroscope (an optical instrument used to view the ureter) through your urethra and into the bladder. The scope is then directed up the ureter. From within the ureter, the physician will have the option to take a biopsy or perform a ureteral washing, place a stent (a small, short tube of flexible plastic mesh), extract a stone, or laser a stone into tiny particles. A ureteral stent is placed in order to hold the ureter open; this may be indicated for a patient with a ureteral stricture or a patient with stones. If you have a stone that is too big to be retrieved on its own, a small fiber (either laser, ultrasonic, or electohyraulic) is passed through the ureteroscope to break it up into tiny particles. The physician can then either retrieve the fragments with a basket or, if they are small enough, let them pass on their own after the procedures is completed.  The procedure takes approximately one hour and you will be allowed to  go home the same day.    Risks Associated with Procedure  Injury to the ureter  Urinary tract infection  Bleeding  Abdominal pain    What to Expect After Surgery  You may experience some discomfort, especially if you have particles passing through the ureter. This may be quite intense, but sufficient pain relieving medications will be given to you. You may also experience some blood in your urine or burning with urination. If you have been prescribed anti-spasmodic medication (i.e. Prosed, Urelle, or Pyridium) you may take them in order to relieve some of the burning sensation while urinating. These medications may discolor your urine.     A stent, may cause urinary frequency, urgency, burning with urination and pain in kidney while urinating.    What To Do After Your Procedure  It is recommended to start with a light meal and advance as tolerated.  Drink plenty of fluids, 10-12 (8 oz) glasses of clear liquids per day, unless given specific restrictions.  Avoid straining and constipation. You can prevent constipation by drinking fluids and adding fruit and vegetables to your diet. You may also add stool softners as needed to prevent constipation.  Take your pain medications as prescribed, when needed. These medications may cause constipation.  If prescribed additional medications (i.e. Rapaflo, Flomax, or Uroxatral) take as directed. Finish/discard antibiotic prescription given prior to lithotripsy.  You may resume any prescription medications, but DO NOT take any aspirin, ibuprofen or blood thinners until approved by your physician.  If you had a stone that was broken into tiny particles, strain your urine as directed and save sand particles. For best collection results, allow strainer to dry before attempting to collect specimen.     Activity Restrictions  For the 24 hours following your procedure DO NOT:  Drive a vehicle  Make any important decisions  Operative any machinery or electrical appliances  Drink  alcoholic beverages  Take any tranquilizers or sleeping pills unless approved by your physician  You may resume normal activities as tolerated. However, avoid sports or really strenuous exercise until there is no blood in your urine, or when physician approves.  When to Call Your Doctor  You cannot pass urine.  Your urine becomes so bloody that you cannot see through it.  Your fever is over 100.5° F (orally) for two readings taken 4 hours apart.  You have severe burning, pain, and irritation with urination.  Your pain is unrelieved by pain medication.  You have persistent nausea and vomiting.    Results  Any pathology results will be reviewed at your first post-op appointment with your physician.    Follow-Up Care:  Please schedule your follow up appointments as soon as you get home your surgery.    *Date of post-op appointment (1-2 weeks after procedure):    _____________________________________________________________________________  *You will be instructed upon discharge when to follow-up with your physician. If you have a stent placed you will be given instructions when it is to be removed. In some cases you will be able to remove the stent yourself at home or you will be required to schedule an in-office procedure in which the stent will be removed. At the time of your follow-up appointment, an x-ray may be performed in order to evaluate the success of the procedure; please arrive 10-15 minutes early in order to complete the x-ray before seeing your physician.  Note: Some stents have a thread/string that may be seen coming from the urethra. DO NOT pull or dislodge the string. If you notice increased leakage of urine, notify your urologist  If you have passed a stone and are able to preserve the specimen, please bring the specimen to your appointment so that a stone analysis may be completed. After your results are received we will inform you of diet prevention strategies that will aide in preventing future  stone formation.    Thank you for letting us be involved in your healthcare.

## 2024-07-29 NOTE — PLAN OF CARE
Problem: Patient/Family Goals  Goal: Patient/Family Long Term Goal  Description: Patient's Long Term Goal: Go home with adequate resources    Interventions:  - IV Abx  - Identify barriers to discharge w/pt and caregiver  - Include patient/family/discharge partner in discharge planning  - Arrange for needed discharge resources and transportation as appropriate  - Identify discharge learning needs   - Consider post-discharge preferences of patient/family/discharge partner  - Assess patient's ability to be responsible for managing their own health  - Refer to Case Management Department for coordinating discharge planning if the patient needs post-hospital services  - See additional Care Plan goals for specific interventions  Outcome: Progressing  Goal: Patient/Family Short Term Goal  Description: Patient's Short Term Goal:   7/26 AM: Tube feedings  07/26/2024 - control nausea  07/27/2024  - tolerate  gtube feeding  7/28: speech eval  07/28/2024 - \"sleep\"  Interventions:   - monitor gastric residuals  - compazine and Zofran as ordered.   - HOB above 30 degrees.  Check residuals, monitor/medicate for nausea, gradually increase rate as tolerated to a goal of 70  - cluster care, dim lights, keep room quiet, manage pain/discomfort  -   - See additional Care Plan goals for specific interventions  Outcome: Progressing     Problem: RESPIRATORY - ADULT  Goal: Achieves optimal ventilation and oxygenation  Description: INTERVENTIONS:  - Assess for changes in respiratory status  - Assess for changes in mentation and behavior  - Position to facilitate oxygenation and minimize respiratory effort  - Oxygen supplementation based on oxygen saturation or ABGs  - Provide Smoking Cessation handout, if applicable  - Encourage broncho-pulmonary hygiene including cough, deep breathe, Incentive Spirometry  - Assess the need for suctioning and perform as needed  - Assess and instruct to report SOB or any respiratory difficulty  - Respiratory  Therapy support as indicated  - Manage/alleviate anxiety  - Monitor for signs/symptoms of CO2 retention  Outcome: Progressing     Problem: GASTROINTESTINAL - ADULT  Goal: Minimal or absence of nausea and vomiting  Description: INTERVENTIONS:  - Maintain adequate hydration with IV or PO as ordered and tolerated  - Nasogastric tube to low intermittent suction as ordered  - Evaluate effectiveness of ordered antiemetic medications  - Provide nonpharmacologic comfort measures as appropriate  - Advance diet as tolerated, if ordered  - Obtain nutritional consult as needed  - Evaluate fluid balance  Outcome: Progressing  Goal: Maintains adequate nutritional intake (undernourished)  Description: INTERVENTIONS:  - Monitor percentage of each meal consumed  - Identify factors contributing to decreased intake, treat as appropriate  - Assist with meals as needed  - Monitor I&O, WT and lab values  - Obtain nutritional consult as needed  - Optimize oral hygiene and moisture  - Encourage food from home; allow for food preferences  - Enhance eating environment  Outcome: Progressing     Problem: GENITOURINARY - ADULT  Goal: Absence of urinary retention  Description: INTERVENTIONS:  - Assess patient’s ability to void and empty bladder  - Monitor intake/output and perform bladder scan as needed  - Follow urinary retention protocol/standard of care  - Consider collaborating with pharmacy to review patient's medication profile  - Implement strategies to promote bladder emptying  Outcome: Progressing     Problem: METABOLIC/FLUID AND ELECTROLYTES - ADULT  Goal: Glucose maintained within prescribed range  Description: INTERVENTIONS:  - Monitor Blood Glucose as ordered  - Assess for signs and symptoms of hyperglycemia and hypoglycemia  - Administer ordered medications to maintain glucose within target range  - Assess barriers to adequate nutritional intake and initiate nutrition consult as needed  - Instruct patient on self management of  diabetes  Outcome: Progressing     Problem: SKIN/TISSUE INTEGRITY - ADULT  Goal: Skin integrity remains intact  Description: INTERVENTIONS  - Assess and document risk factors for pressure ulcer development  - Assess and document skin integrity  - Monitor for areas of redness and/or skin breakdown  - Initiate interventions, skin care algorithm/standards of care as needed  Outcome: Progressing

## 2024-07-29 NOTE — OCCUPATIONAL THERAPY NOTE
OT orders received and pt chart reviewed. Pt is DEP for ADLs and t/f at baseline. OT will sign off

## 2024-07-29 NOTE — PLAN OF CARE
Pt is A&O to self, able to reorient. Soft spoken, delayed speech. Room air, O2 sating WNL. Pt self suctions with Lisbeth PRN. Tele-NSR/ST when having emesis episodes. Pt denies pain and shortness of breath. Pink/scar on sacrum, mepliex changed, dressing C/D/I, waffle cushion in place. Suprapubic cath. X1 clear mucous bowel movement. Pt reported nausea and had x3 episodes of emesis this morning, zofran given and KUB XR complete. Swallow study done. Brother updated via phone, all questions and concerns addressed, verbalized understanding. Pt has had poor appetite today, did not like majority of foods, RD adjusted tube feeds. Gave x1 water flush in evening. Pt updated on POC, all questions and concerns addressed, pt verbalized understanding. Safety precautions in place, no further needs at this time.     Problem: Patient/Family Goals  Goal: Patient/Family Long Term Goal  Description: Patient's Long Term Goal: Go home with adequate resources    Interventions:  - IV Abx  - Identify barriers to discharge w/pt and caregiver  - Include patient/family/discharge partner in discharge planning  - Arrange for needed discharge resources and transportation as appropriate  - Identify discharge learning needs   - Consider post-discharge preferences of patient/family/discharge partner  - Assess patient's ability to be responsible for managing their own health  - Refer to Case Management Department for coordinating discharge planning if the patient needs post-hospital services  - See additional Care Plan goals for specific interventions  Outcome: Progressing  Goal: Patient/Family Short Term Goal  Description: Patient's Short Term Goal:   7/26 AM: Tube feedings  07/26/2024 - control nausea  07/27/2024  - tolerate  gtube feeding  7/28: speech eval  07/28/2024 - \"sleep\"  7/29: relief of nausea and vomiting  Interventions:   - monitor gastric residuals  - compazine and Zofran as ordered.   - HOB above 30 degrees.  Check residuals,  monitor/medicate for nausea, gradually increase rate as tolerated to a goal of 70  - cluster care, dim lights, keep room quiet, manage pain/discomfort  -   - See additional Care Plan goals for specific interventions  Outcome: Progressing     Problem: RESPIRATORY - ADULT  Goal: Achieves optimal ventilation and oxygenation  Description: INTERVENTIONS:  - Assess for changes in respiratory status  - Assess for changes in mentation and behavior  - Position to facilitate oxygenation and minimize respiratory effort  - Oxygen supplementation based on oxygen saturation or ABGs  - Provide Smoking Cessation handout, if applicable  - Encourage broncho-pulmonary hygiene including cough, deep breathe, Incentive Spirometry  - Assess the need for suctioning and perform as needed  - Assess and instruct to report SOB or any respiratory difficulty  - Respiratory Therapy support as indicated  - Manage/alleviate anxiety  - Monitor for signs/symptoms of CO2 retention  Outcome: Progressing     Problem: GASTROINTESTINAL - ADULT  Goal: Minimal or absence of nausea and vomiting  Description: INTERVENTIONS:  - Maintain adequate hydration with IV or PO as ordered and tolerated  - Nasogastric tube to low intermittent suction as ordered  - Evaluate effectiveness of ordered antiemetic medications  - Provide nonpharmacologic comfort measures as appropriate  - Advance diet as tolerated, if ordered  - Obtain nutritional consult as needed  - Evaluate fluid balance  Outcome: Progressing  Goal: Maintains adequate nutritional intake (undernourished)  Description: INTERVENTIONS:  - Monitor percentage of each meal consumed  - Identify factors contributing to decreased intake, treat as appropriate  - Assist with meals as needed  - Monitor I&O, WT and lab values  - Obtain nutritional consult as needed  - Optimize oral hygiene and moisture  - Encourage food from home; allow for food preferences  - Enhance eating environment  Outcome: Progressing     Problem:  GENITOURINARY - ADULT  Goal: Absence of urinary retention  Description: INTERVENTIONS:  - Assess patient’s ability to void and empty bladder  - Monitor intake/output and perform bladder scan as needed  - Follow urinary retention protocol/standard of care  - Consider collaborating with pharmacy to review patient's medication profile  - Implement strategies to promote bladder emptying  Outcome: Progressing     Problem: METABOLIC/FLUID AND ELECTROLYTES - ADULT  Goal: Glucose maintained within prescribed range  Description: INTERVENTIONS:  - Monitor Blood Glucose as ordered  - Assess for signs and symptoms of hyperglycemia and hypoglycemia  - Administer ordered medications to maintain glucose within target range  - Assess barriers to adequate nutritional intake and initiate nutrition consult as needed  - Instruct patient on self management of diabetes  Outcome: Progressing     Problem: SKIN/TISSUE INTEGRITY - ADULT  Goal: Skin integrity remains intact  Description: INTERVENTIONS  - Assess and document risk factors for pressure ulcer development  - Assess and document skin integrity  - Monitor for areas of redness and/or skin breakdown  - Initiate interventions, skin care algorithm/standards of care as needed  Outcome: Progressing

## 2024-07-30 LAB
ANION GAP SERPL CALC-SCNC: 5 MMOL/L (ref 0–18)
BASOPHILS # BLD AUTO: 0.04 X10(3) UL (ref 0–0.2)
BASOPHILS NFR BLD AUTO: 0.5 %
BUN BLD-MCNC: 15 MG/DL (ref 9–23)
CALCIUM BLD-MCNC: 9.4 MG/DL (ref 8.7–10.4)
CHLORIDE SERPL-SCNC: 107 MMOL/L (ref 98–112)
CO2 SERPL-SCNC: 29 MMOL/L (ref 21–32)
CREAT BLD-MCNC: 0.98 MG/DL
EGFRCR SERPLBLD CKD-EPI 2021: 91 ML/MIN/1.73M2 (ref 60–?)
EOSINOPHIL # BLD AUTO: 0.23 X10(3) UL (ref 0–0.7)
EOSINOPHIL NFR BLD AUTO: 2.6 %
ERYTHROCYTE [DISTWIDTH] IN BLOOD BY AUTOMATED COUNT: 13.4 %
GLUCOSE BLD-MCNC: 100 MG/DL (ref 70–99)
GLUCOSE BLD-MCNC: 102 MG/DL (ref 70–99)
GLUCOSE BLD-MCNC: 154 MG/DL (ref 70–99)
GLUCOSE BLD-MCNC: 90 MG/DL (ref 70–99)
GLUCOSE BLD-MCNC: 94 MG/DL (ref 70–99)
HCT VFR BLD AUTO: 34.1 %
HGB BLD-MCNC: 10.8 G/DL
IMM GRANULOCYTES # BLD AUTO: 0.08 X10(3) UL (ref 0–1)
IMM GRANULOCYTES NFR BLD: 0.9 %
LYMPHOCYTES # BLD AUTO: 1.5 X10(3) UL (ref 1–4)
LYMPHOCYTES NFR BLD AUTO: 17.1 %
MAGNESIUM SERPL-MCNC: 1.2 MG/DL (ref 1.6–2.6)
MAGNESIUM SERPL-MCNC: 1.2 MG/DL (ref 1.6–2.6)
MCH RBC QN AUTO: 29.2 PG (ref 26–34)
MCHC RBC AUTO-ENTMCNC: 31.7 G/DL (ref 31–37)
MCV RBC AUTO: 92.2 FL
MONOCYTES # BLD AUTO: 0.71 X10(3) UL (ref 0.1–1)
MONOCYTES NFR BLD AUTO: 8.1 %
NEUTROPHILS # BLD AUTO: 6.22 X10 (3) UL (ref 1.5–7.7)
NEUTROPHILS # BLD AUTO: 6.22 X10(3) UL (ref 1.5–7.7)
NEUTROPHILS NFR BLD AUTO: 70.8 %
OSMOLALITY SERPL CALC.SUM OF ELEC: 292 MOSM/KG (ref 275–295)
PLATELET # BLD AUTO: 480 10(3)UL (ref 150–450)
POTASSIUM SERPL-SCNC: 3.4 MMOL/L (ref 3.5–5.1)
POTASSIUM SERPL-SCNC: 4.3 MMOL/L (ref 3.5–5.1)
RBC # BLD AUTO: 3.7 X10(6)UL
SODIUM SERPL-SCNC: 141 MMOL/L (ref 136–145)
WBC # BLD AUTO: 8.8 X10(3) UL (ref 4–11)

## 2024-07-30 PROCEDURE — 83735 ASSAY OF MAGNESIUM: CPT | Performed by: UROLOGY

## 2024-07-30 PROCEDURE — 84132 ASSAY OF SERUM POTASSIUM: CPT | Performed by: STUDENT IN AN ORGANIZED HEALTH CARE EDUCATION/TRAINING PROGRAM

## 2024-07-30 PROCEDURE — 83735 ASSAY OF MAGNESIUM: CPT | Performed by: STUDENT IN AN ORGANIZED HEALTH CARE EDUCATION/TRAINING PROGRAM

## 2024-07-30 PROCEDURE — 80048 BASIC METABOLIC PNL TOTAL CA: CPT | Performed by: UROLOGY

## 2024-07-30 PROCEDURE — 85025 COMPLETE CBC W/AUTO DIFF WBC: CPT | Performed by: UROLOGY

## 2024-07-30 PROCEDURE — 82962 GLUCOSE BLOOD TEST: CPT

## 2024-07-30 RX ORDER — MULTIPLE VITAMINS W/ MINERALS TAB 9MG-400MCG
1 TAB ORAL DAILY
Status: DISCONTINUED | OUTPATIENT
Start: 2024-07-31 | End: 2024-08-01

## 2024-07-30 RX ORDER — POTASSIUM CHLORIDE 1.5 G/1.58G
40 POWDER, FOR SOLUTION ORAL EVERY 4 HOURS
Status: COMPLETED | OUTPATIENT
Start: 2024-07-30 | End: 2024-07-30

## 2024-07-30 RX ORDER — MAGNESIUM OXIDE 400 MG/1
800 TABLET ORAL ONCE
Status: COMPLETED | OUTPATIENT
Start: 2024-07-30 | End: 2024-07-30

## 2024-07-30 NOTE — PROGRESS NOTES
Infectious Disease Progress Note      Date of admission: 7/25/2024  9:57 PM     Reason for consult: Catheter associated UTI, pyelonephritis    Subjective: Doing about the same.  No abdominal pain.  No nausea or vomiting.  No diarrhea.  Pretty sleepy today as he has not slept well over the last day    The rest of the systems were reviewed and found to be negative except was mentioned above    Interval events: This is a 54-year-old male patient with history of stroke, currently bedbound with a suprapubic catheter, presents here with high fevers in the setting of a catheter associated UTI.  Patient had grown ESBL producing organisms along with Enterococcus faecalis in the past.  Currently on IV meropenem, currently culture growing Proteus mirabilis, susceptibilities pending  CT now showing obstructing right-sided nephrolithiasis, status post cystoscopy and stenting on 7/27/2024    Medications:    potassium chloride    metoclopramide    lisinopril    guaiFENesin    acetaminophen    amLODIPine    ascorbic acid    atorvastatin    carvedilol    docusate    terazosin    hydrALAZINE    rivaroxaban    meropenem    buPROPion    ondansetron    dextrose 10%    lipase-protease-amylase (Lip-Prot-Amyl) **AND** sodium bicarbonate    fluconazole     Allergies:  Allergies   Allergen Reactions    Augmentin [Amoxicillin-Pot Clavulanate] UNKNOWN     Per NH records; tolerates cephalosporins    Penicillins RASH     Per fiance at this time. Tolerates cephalosporins       Physical Exam:  Vitals:    07/30/24 1118   BP: 128/66   Pulse: 75   Resp: 18   Temp: 97.6 °F (36.4 °C)     Vitals signs and nursing note reviewed.   Constitutional:       Appearance: Normal appearance.   HENT:      Head: Normocephalic and atraumatic.      Mouth: Mucous membranes are moist.   Neck:      Musculoskeletal: Neck supple.   Cardiovascular:      Rate and Rhythm: Normal rate.   Pulmonary:      Effort: Pulmonary effort is normal. No respiratory distress.    Musculoskeletal:     Right lower leg: No edema.      Left lower leg: No edema.   Skin:     General: Skin is warm and dry.     Laboratory data:  I have reviewed all the lab results independently.  Lab Results   Component Value Date    WBC 8.8 07/30/2024    HGB 10.8 07/30/2024    HCT 34.1 07/30/2024    .0 07/30/2024    CREATSERUM 0.98 07/30/2024    BUN 15 07/30/2024     07/30/2024    K 3.4 07/30/2024     07/30/2024    CO2 29.0 07/30/2024    GLU 90 07/30/2024    CA 9.4 07/30/2024    MG 1.2 07/30/2024    MG 1.2 07/30/2024      Recent Labs   Lab 07/30/24  0720   RBC 3.70*   HGB 10.8*   HCT 34.1*   MCV 92.2   MCH 29.2   MCHC 31.7   RDW 13.4   NEPRELIM 6.22   WBC 8.8   .0*      Microbiology data:  Hospital Encounter on 07/25/24   1. Blood Culture     Status: None (Preliminary result)    Collection Time: 07/25/24 10:34 PM    Specimen: Blood,peripheral   Result Value Ref Range    Blood Culture Result No Growth 4 Days N/A   2. Urine Culture, Routine     Status: Abnormal    Collection Time: 07/25/24 10:33 PM    Specimen: Urine, clean catch   Result Value Ref Range    Urine Culture 10,000 - 50,000 CFU/ML Proteus mirabilis ESBL Pos (A) N/A       Susceptibility    Proteus mirabilis ESBL Pos -  (no method available)     Cefazolin 8 Sensitive      Cefepime  Resistant      Ceftazidime  Resistant      Ceftriaxone <=1 Sensitive      Ciprofloxacin >=4 Resistant      Gentamicin <=1 Sensitive      Meropenem <=0.25 Sensitive      Levofloxacin >=8 Resistant      Nitrofurantoin 128 Resistant      Trimethoprim/Sulfa 40 Sensitive         Radiology:  I have reviewed all imagining data available independently.   CT abdomen pelvis:  Moderate hydronephrosis on the right side with a component of cystitis and right-sided ureteritis and pyelonephritis    Impression:  Jose Ruvalcaba is a 54 year old male with    Acute complicated right-sided pyelonephritis in the setting of obstructive nephrolithiasis  This is in the  setting of a chronic suprapubic catheter  In the past, the patient had grown ESBL producing Proteus along with Enterococcus faecalis  Currently on IV meropenem  Urine culture with ESBL producing Proteus mirabilis, susceptible to Bactrim  Obstructive right-sided nephrolithiasis  Urology following  Status post cystoscopy and stent placement on 7/27/2024  TOBIN on CKD  Management as per the primary team  Antibiotics will be renally adjusted  Improving  Thrush  On p.o. fluconazole    Recommendations:    Continue IV meropenem while inpatient  Plan to discharge patient on Bactrim DS 1 tablet twice a day to finish a total of 10 days from 7/27, that is through 8/5/24 when ready  Continue p.o. fluconazole for total of 7 days through 7/31/2024  Continue to monitor daily labs for antibiotic toxicities  Further recommendations will depend on the above work-up and clinical progress     The plan of care was discussed with the primary hospital team, Pepe Cason DO     Recommendations were also discussed with the patient; all questions were answered.     Thank you for this consultation. Please don't hesitate to call the ID team for questions or any acute changes in patient's clinical condition.    Please note that this report has been produced using speech recognition software and may contain errors related to that system including, but not limited to, errors in grammar, punctuation, and spelling, as well as words and phrases that possibly may have been recognized inappropriately.  If there are any questions or concerns, contact the dictating provider for clarification.    The 21st Century Cures Act makes medical notes like these available to patients in the interest of transparency. Please be advised this is a medical document. Medical documents are intended to carry relevant information, facts as evident, and the clinical opinion of the practitioner. The medical note is intended as peer to peer communication and may appear  blunt or direct. It is written in medical language and may contain abbreviations or verbiage that are unfamiliar.     Gaston Puentes MD  [facility]. Tel: [phone]. Fax: [phone].     Jose Ruvalcaba : 1970 MRN: [id number] CSN: [id number]

## 2024-07-30 NOTE — PLAN OF CARE
Pt Aox3-4, Delayed responses/mumbled speech at times, nods. R sided wknss baseline(hx cva). Aspiration precaution in place. O2 wnl on RA. Pt oral suctions himself. NSR ont gus, EP cardiac, SCDs. Dry heaves at times, schedu;ed reglan. Chronic suprapubic catheter. BB. Calorie count, G tube  to LUQ. Q6 accucheck. IV abx. Meds per Gtube. Mepilex on sacrum. Denies pain. Pt updated on POC. Call light within reach, safety precautions in place. POC continues.     Problem: Patient/Family Goals  Goal: Patient/Family Long Term Goal  Description: Patient's Long Term Goal: Go home with adequate resources    Interventions:  - IV Abx  - Identify barriers to discharge w/pt and caregiver  - Include patient/family/discharge partner in discharge planning  - Arrange for needed discharge resources and transportation as appropriate  - Identify discharge learning needs   - Consider post-discharge preferences of patient/family/discharge partner  - Assess patient's ability to be responsible for managing their own health  - Refer to Case Management Department for coordinating discharge planning if the patient needs post-hospital services  - See additional Care Plan goals for specific interventions  Outcome: Progressing  Goal: Patient/Family Short Term Goal  Description: Patient's Short Term Goal:   7/26 AM: Tube feedings  07/26/2024 - control nausea  07/27/2024  - tolerate  gtube feeding  7/28: speech eval  07/28/2024 - \"sleep\"  7/29: relief of nausea and vomiting  7/29noc: sleep, improve nausea  Interventions:   - monitor gastric residuals  - compazine and Zofran as ordered.   - HOB above 30 degrees.  Check residuals, monitor/medicate for nausea, gradually increase rate as tolerated to a goal of 70  - cluster care, dim lights, keep room quiet, manage pain/discomfort  -   - See additional Care Plan goals for specific interventions  Outcome: Progressing     Problem: RESPIRATORY - ADULT  Goal: Achieves optimal ventilation and  oxygenation  Description: INTERVENTIONS:  - Assess for changes in respiratory status  - Assess for changes in mentation and behavior  - Position to facilitate oxygenation and minimize respiratory effort  - Oxygen supplementation based on oxygen saturation or ABGs  - Provide Smoking Cessation handout, if applicable  - Encourage broncho-pulmonary hygiene including cough, deep breathe, Incentive Spirometry  - Assess the need for suctioning and perform as needed  - Assess and instruct to report SOB or any respiratory difficulty  - Respiratory Therapy support as indicated  - Manage/alleviate anxiety  - Monitor for signs/symptoms of CO2 retention  Outcome: Progressing     Problem: GASTROINTESTINAL - ADULT  Goal: Minimal or absence of nausea and vomiting  Description: INTERVENTIONS:  - Maintain adequate hydration with IV or PO as ordered and tolerated  - Nasogastric tube to low intermittent suction as ordered  - Evaluate effectiveness of ordered antiemetic medications  - Provide nonpharmacologic comfort measures as appropriate  - Advance diet as tolerated, if ordered  - Obtain nutritional consult as needed  - Evaluate fluid balance  Outcome: Progressing  Goal: Maintains adequate nutritional intake (undernourished)  Description: INTERVENTIONS:  - Monitor percentage of each meal consumed  - Identify factors contributing to decreased intake, treat as appropriate  - Assist with meals as needed  - Monitor I&O, WT and lab values  - Obtain nutritional consult as needed  - Optimize oral hygiene and moisture  - Encourage food from home; allow for food preferences  - Enhance eating environment  Outcome: Progressing     Problem: GENITOURINARY - ADULT  Goal: Absence of urinary retention  Description: INTERVENTIONS:  - Assess patient’s ability to void and empty bladder  - Monitor intake/output and perform bladder scan as needed  - Follow urinary retention protocol/standard of care  - Consider collaborating with pharmacy to review  patient's medication profile  - Implement strategies to promote bladder emptying  Outcome: Progressing     Problem: METABOLIC/FLUID AND ELECTROLYTES - ADULT  Goal: Glucose maintained within prescribed range  Description: INTERVENTIONS:  - Monitor Blood Glucose as ordered  - Assess for signs and symptoms of hyperglycemia and hypoglycemia  - Administer ordered medications to maintain glucose within target range  - Assess barriers to adequate nutritional intake and initiate nutrition consult as needed  - Instruct patient on self management of diabetes  Outcome: Progressing     Problem: SKIN/TISSUE INTEGRITY - ADULT  Goal: Skin integrity remains intact  Description: INTERVENTIONS  - Assess and document risk factors for pressure ulcer development  - Assess and document skin integrity  - Monitor for areas of redness and/or skin breakdown  - Initiate interventions, skin care algorithm/standards of care as needed  Outcome: Progressing

## 2024-07-30 NOTE — PROGRESS NOTES
Assumed care of pt around 0730. Pt AO x-4. Delayed responses, mumbled speech at times. RS weakness/residual, hx CVA, aspiration precautions. VSS on RA. Tele SR. Kaufman at bedside, pt self suctions prn. Chronic suprapubic. Scheduled reglan. IV ABX. Calorie count, pt tolerating meals. Peg tube, meds. Pt max assist. Updated on poc

## 2024-07-30 NOTE — PAYOR COMM NOTE
--------------  7/29 CONTINUED STAY REVIEW    Payor: [facility]  Subscriber #:  [id number]  Authorization Number: [id number]    ID:    Doing about the same.  No abdominal pain.  No nausea or vomiting.  No diarrhea.       Physical Exam:      Vitals:     07/29/24 1105   BP: (!) 147/100   Pulse: 79   Resp: 16   Temp: 98.2 °F (36.8 °C)      Vitals signs and nursing note reviewed.   Constitutional:       Appearance: Normal appearance.   HENT:      Head: Normocephalic and atraumatic.      Mouth: Mucous membranes are moist.   Neck:      Musculoskeletal: Neck supple.   Cardiovascular:      Rate and Rhythm: Normal rate.   Pulmonary:      Effort: Pulmonary effort is normal. No respiratory distress.   Musculoskeletal:     Right lower leg: No edema.      Left lower leg: No edema.   Skin:     General: Skin is warm and dry.      Laboratory data:  I have reviewed all the lab results independently.        Lab Results   Component Value Date     WBC 8.9 07/29/2024     HGB 10.6 07/29/2024     HCT 32.7 07/29/2024     .0 07/29/2024     CREATSERUM 0.91 07/29/2024     BUN 15 07/29/2024      07/29/2024     K 3.6 07/29/2024      07/29/2024     CO2 27.0 07/29/2024     GLU 98 07/29/2024     CA 9.6 07/29/2024     MG 1.2 07/29/2024       Impression:  Jose Ruvalcaba is a 54 year old male with     Acute complicated right-sided pyelonephritis in the setting of obstructive nephrolithiasis  This is in the setting of a chronic suprapubic catheter  In the past, the patient had grown ESBL producing Proteus along with Enterococcus faecalis  Currently on IV meropenem  Urine culture with ESBL producing Proteus mirabilis, susceptible to Bactrim  Obstructive right-sided nephrolithiasis  Urology following  Status post cystoscopy and stent placement on 7/27/2024  TOBIN on CKD  Management as per the primary team  Antibiotics will be renally adjusted  Improving  Thrush  On p.o. fluconazole     Recommendations:     Continue IV meropenem while inpatient  Plan to discharge patient on Bactrim DS 1 tablet twice a day to finish a total of 10 days from 7/27, that is through 8/5/24 when ready  Continue p.o. fluconazole for total of 7 days through 7/31/2024  Continue to monitor daily labs for antibiotic toxicities         HOSPITALIST:    Has been having frequent episode of emesis.  Per RN, not associated with oral intake.  Worked with speech today.      Scheduled Medications    metoclopramide  10 mg Intravenous Q8H    lisinopril  20 mg Oral Daily    amLODIPine  10 mg Per G Tube QPM    ascorbic acid  500 mg Per G Tube Daily    atorvastatin  40 mg Per G Tube Nightly    carvedilol  25 mg Per G Tube BID with meals    docusate  100 mg Per G Tube BID    terazosin  10 mg Per G Tube Nightly    hydrALAZINE  100 mg Per G Tube TID    rivaroxaban  20 mg Per G Tube Daily    meropenem  500 mg Intravenous Q8H    buPROPion  100 mg Per G Tube TID    fluconazole  200 mg Per G Tube Daily         Continuous Infusions:   Medication Infusions[]Expand by Default    lactated ringers 125 mL/hr at 07/29/24 0647    dextrose 10%            Assessment/Plan:  Principal Problem:    Nosocomial pneumonia  Active Problems:    Urinary tract infection associated with cystostomy catheter, initial encounter ([facility])    Hyponatremia    Thrush, oral      54 year old male with PMH sig for 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     Sepsis POA 2/2 CAUTI +/- RML PNA  - given hx of MDRO, started on meropenem  -Urine cultures ESBL, proteus mirabilis,   - PNA work up negative, likely all urine infection  - d/w ID     R distal ureter stone with hydroureter  - urology consulted  - s/p cystoscopy and stent placement 7/27  - monitor for postobstructive diureis  - cont IVF     Nausea and vomiting  - X-ray obtained to rule out  obstruction-nonspecific bowel gas pattern, has been having bowel movement as per RN  - CT from 7/26 reviewed  - EKG within normal  - Possible gastroparesis, trial of Reglan     Oral thrush  - fluconazole per ID     Acute Renal Injury - resolved  - prerenal, infection  - cont isotonic crystalloids     CVA   - resulting in R hemiparesis, nonverbal     Suprapubic cath  - routine cares     G tube  - routine cares     Essential HTN  - coreg, amlodipine, hydralazine     Hyperlipidemia  - statin     Remote hx of LLE DVT in 2023   - on xarelto   - certainly high risk of VTE, but given hx of hemorrhagic stroke the dosing should probably be revisited by PCP     FEN: Tfs, PT/OT, diet per speech  Proph: SCDs, xarelto      MEDICATIONS ADMINISTERED IN LAST 1 DAY:    meropenem (Merrem) 500 mg in sodium chloride 0.9% 100 mL IVPB-MBP       Date Action Dose Route User    7/30/2024 0452 New Bag 500 mg Intravenous Shraddha Navarro RN    7/29/2024 2104 New Bag 500 mg Intravenous Shraddha Navarro RN    7/29/2024 1337 New Bag 500 mg Intravenous Jacqueilne Doran RN          metoclopramide (Reglan) 5 mg/mL injection 10 mg       Date Action Dose Route User    7/29/2024 2351 Given 10 mg Intravenous Shraddha Navarro RN    7/29/2024 1702 Given 10 mg Intravenous Jacqueline Doran RN

## 2024-07-30 NOTE — DIETARY NOTE
[facility]  NUTRITION ASSESSMENT    Pt does not meet malnutrition criteria at this time.    NUTRITION INTERVENTION:  Coordination of Nutrition Care - SLP consult prior to diet advancement.  Meals and Snacks - Continue Soft/Easy to Chew diet as tolerated per SLP; monitor po intake.  Medical Nutrition Supplements - RD added Ensure Plus HP TID.  Enteral Nutrition - If PO intake remains poor and not meeting needs, will change to bolus TF regimen based on home goal.    PATIENT STATUS:  7/30: After TF initiated on 7/26, pt was constantly complaining of nausea with TF only at 20 ml/hr so TF were intermittently held and only got up to 40 ml/hr on 7/28 and have been off since then. Noted pt went to OR 7/27 for cystoscopy, retrograde pyelogram, insertion R ureteral stent. SLP evaluated 7/28 and diet was advanced to soft/easy to chew solids and thin liquids. Pt had multiple episodes of emesis yesterday not associated with oral intake so AXR ordered to r/o obstruction which was negative. Possible gastroparesis so reglan initiated yesterday. Visited pt at bedside. He was able to nod head yes and no but otherwise unable to communicate. He reports feeling better today with no N/V since yesterday. Was eating lunch during visit and reports appetite is better today. Discussed adding ONS with all meals and pt reports liking all flavors. Calorie count ordered yesterday to determine if TF warranted. Noted nursing home regimen was based on percentage of meals eaten:  0-25% = Jevity 1.5 - 474 ml  26-50% = Jevity 1.5 - 355 ml  51-75% = Jevity 1.5 237 ml  >75% = no bolus.    7/26: 52 y/o male from [place] admitted with fever and hypotension per ED notes. No H&P yet. Received nutrition consult for G-tube with feedings. Pt on bolus feeds at NH but about infused depended on average PO intake. However, pt has not been taking PO orally due to thrush. TF ordered to provided 100% of nutrition at this time. Will monitor PO intakes and  adjust TF's, as needed.     PMH: CVA, aphasia, G-tube    ANTHROPOMETRICS:  Ht:  6' 2\"  Wt: 98 kg (216 lb 0.8 oz).   BMI: Body mass index is 27.74 kg/m².  IBW: 86.4 kg    WEIGHT HISTORY:   Noted no significant wt changes per EMR hx.  Wt Readings from Last 10 Encounters:   07/26/24 98 kg (216 lb 0.8 oz)   02/22/24 98 kg (216 lb 0.8 oz)   05/26/23 98 kg (216 lb)   05/22/23 98 kg (216 lb)   05/13/23 95.3 kg (210 lb)   05/05/23 99.8 kg (220 lb)   04/26/23 100.1 kg (220 lb 10.9 oz)   04/14/23 94 kg (207 lb 3.7 oz)   02/09/23 93 kg (205 lb 0.4 oz)   11/30/22 96.6 kg (213 lb)      NUTRITION:  Diet:       Procedures    Regular/General diet Fluid Consistency: Thin Liquids ; Texture Consistency: Soft / Easy to Chew ; Is Patient on Accuchecks? Yes     Food Allergies: No  Cultural/Ethnic/Sikh Preferences Addressed: Yes    Percent Meals Eaten (last 3 days)       Date/Time Percent Meals Eaten (%)    07/28/24 1400 60 %    07/28/24 1823 50 %    07/29/24 0900 40 %    07/29/24 1337 30 %          Calorie Count:  7/28:  B- NPO  L- 503 kcal, 27 gm pro  D- 386 kcal, 22 gm pro    7/29:  B- 138 kcal, 4 gm pro  L- 187 kcal, 7 gm pro  D- no meal recorded    7/30:  B- 764 kcal, 27 gm pro  L- pending  D- pending    7/31:  B-pending  L- pending  D- pending    GI system review: emesis and nausea, +Gtube; last BM 7/29  Skin and wounds: buttocks wound    NUTRITION RELATED PHYSICAL FINDINGS:     1. Body Fat/Muscle Mass: no wasting noted      2. Fluid Accumulation: lower extremity edema and b/l feet    NUTRITION PRESCRIPTION: 98 kg (7/26)  Calories: 8114-5912 calories/day (23-28 calories per kg)  Protein:  grams protein/day ( 1-1.2  grams protein per kg)  Fluid: ~1 ml/kcal or per MD discretion    NUTRITION DIAGNOSIS/PROBLEM:  Inadequate oral intake related to insufficient appetite resulting in inadequate nutrition intake and inability to take or tolerate PO as evidenced by documented/reported insufficient oral intake      MONITOR AND  EVALUATE/NUTRITION GOALS:  PO intake of 75% of meals TID - New  PO intake of 75% of oral nutrition supplement/s - New  Weight stable within 1 to 2 lbs during admission - Ongoing  Tolerate alternative nutrition at 100% of goal - on hold      MEDICATIONS:  Vit C 500 mg, colace, abx, Reglan, prn zofran    LABS:  K+ 3.2, Mg 1.2    Pt is at High nutrition risk    Oliva Fierro RD, LDN, CNSC  Clinical Dietitian  Spectra: [phone]

## 2024-07-30 NOTE — PROGRESS NOTES
[facility]  Hospitalist Progress Note                                                                     [facility]   part of [facility]        Jose Ruvalcaba  1/11/1970    SUBJECTIVE:  Patient seen and examined.  When I evaluated the patient at bedside, he was eating lunch by himself.  No episodes of nausea and vomiting..    OBJECTIVE:  Temp:  [97.6 °F (36.4 °C)-98.7 °F (37.1 °C)] 97.6 °F (36.4 °C)  Pulse:  [73-86] 75  Resp:  [16-18] 18  BP: (128-146)/(66-85) 128/66  SpO2:  [93 %-100 %] 93 %  Exam  Gen: No acute distress, alert, no focal neurologic deficits, nonverbal, follows commands, answers yes or no questions  Neuro: Right-sided hemiparesis, baseline  Pulm: Lungs clear bilaterally, normal respiratory effort  CV: Heart with regular rate and rhythm, no murmur.  Normal PMI.    Abd: Abdomen soft, nontender, nondistended, no organomegaly, bowel sounds present, suprapubic TTP   MSK: Full range of motion in extremities, no clubbing, no cyanosis  Skin: no rashes or lesions,  Neuro: R hemiparesis baseline from previous CVA, nonverbal at times, answers yes or no questions,    Labs:   Recent Labs   Lab 07/25/24  2233 07/26/24  1035 07/27/24  0902 07/28/24  0636 07/29/24  0611 07/30/24  0720   WBC 13.1* 10.5 7.7 7.9 8.9 8.8   HGB 11.7* 10.8* 10.2* 10.5* 10.6* 10.8*   MCV 87.7 91.6 90.2 90.5 90.6 92.2   .0 416.0 398.0 433.0 459.0* 480.0*   INR 2.31*  --   --   --   --   --        Recent Labs   Lab 07/26/24  1035 07/27/24  0902 07/28/24  0636 07/28/24  1633 07/29/24  0227 07/29/24  0611 07/29/24  1607 07/30/24  0720    140  140 142  --   --  140  --  141   K 4.2 3.5  3.5 3.4*  3.4* 3.4* 4.0 3.6 3.9 3.4*    109  109 110  --   --  107  --  107   CO2 22.0 24.0  24.0 26.0  --   --  27.0  --  29.0   BUN 31* 30*  30* 21  --   --  15  --  15   CREATSERUM 1.70* 1.52*  1.52* 1.06  --   --  0.91  --  0.98   CA 9.4 9.0  9.0 9.3  --   --   9.6  --  9.4   MG 2.0 2.2 1.6  --   --  1.2*  --  1.2*  1.2*   * 116*  116* 146*  --   --  98  --  90       Recent Labs   Lab 07/25/24  2233 07/27/24  0902   ALT 28 37   AST 28 42*   ALB 4.1 3.8       Recent Labs   Lab 07/29/24  1144 07/29/24  1751 07/29/24  2352 07/30/24  0454 07/30/24  1209   PGLU 107* 104* 96 94 154*       Meds:   Scheduled:    [START ON 7/31/2024] multivitamin with minerals  1 tablet Oral Daily    metoclopramide  10 mg Intravenous Q8H    lisinopril  20 mg Oral Daily    amLODIPine  10 mg Per G Tube QPM    ascorbic acid  500 mg Per G Tube Daily    atorvastatin  40 mg Per G Tube Nightly    carvedilol  25 mg Per G Tube BID with meals    docusate  100 mg Per G Tube BID    terazosin  10 mg Per G Tube Nightly    hydrALAZINE  100 mg Per G Tube TID    rivaroxaban  20 mg Per G Tube Daily    meropenem  500 mg Intravenous Q8H    buPROPion  100 mg Per G Tube TID    fluconazole  200 mg Per G Tube Daily     Continuous Infusions:    dextrose 10%       PRN:   guaiFENesin    acetaminophen    ondansetron    dextrose 10%    lipase-protease-amylase (Lip-Prot-Amyl) **AND** sodium bicarbonate    Assessment/Plan:  Principal Problem:    Nosocomial pneumonia  Active Problems:    Urinary tract infection associated with cystostomy catheter, initial encounter ([facility])    Hyponatremia    Thrush, oral     54 year old male with PMH sig for 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     Sepsis POA 2/2 CAUTI +/- RML PNA  - given hx of MDRO, started on meropenem  -Urine cultures ESBL, proteus mirabilis,   - PNA work up negative, likely all urine infection  - d/w ID, recommendations noted    R distal ureter stone with hydroureter  - urology consulted  - s/p cystoscopy and stent placement 7/27  - monitor for postobstructive diureis  - cont IVF     Nausea and vomiting-improving  - X-ray  obtained to rule out obstruction-nonspecific bowel gas pattern, has been having bowel movement as per RN  - CT from 7/26 reviewed  - EKG within normal  - Possible gastroparesis, trial of Reglan    Oral thrush  - fluconazole per ID     Acute Renal Injury - resolved  - prerenal, infection  - cont isotonic crystalloids     CVA   - resulting in R hemiparesis, nonverbal     Suprapubic cath  - routine cares     G tube  - routine cares     Essential HTN  - coreg, amlodipine, hydralazine     Hyperlipidemia  - statin    Remote hx of LLE DVT in 2023   - on xarelto   - certainly high risk of VTE, but given hx of hemorrhagic stroke the dosing should probably be revisited by PCP     FEN: Tfs, PT/OT, diet per speech  Proph: SCDs, xarelto    Dispo -anticipate discharge tomorrow      Pepe Cason DO  Hospitalist  [facility] [place]

## 2024-07-31 LAB
ANION GAP SERPL CALC-SCNC: 7 MMOL/L (ref 0–18)
BASOPHILS # BLD AUTO: 0.05 X10(3) UL (ref 0–0.2)
BASOPHILS NFR BLD AUTO: 0.5 %
BUN BLD-MCNC: 19 MG/DL (ref 9–23)
CALCIUM BLD-MCNC: 9.4 MG/DL (ref 8.7–10.4)
CHLORIDE SERPL-SCNC: 108 MMOL/L (ref 98–112)
CO2 SERPL-SCNC: 26 MMOL/L (ref 21–32)
CREAT BLD-MCNC: 0.92 MG/DL
EGFRCR SERPLBLD CKD-EPI 2021: 98 ML/MIN/1.73M2 (ref 60–?)
EOSINOPHIL # BLD AUTO: 0.24 X10(3) UL (ref 0–0.7)
EOSINOPHIL NFR BLD AUTO: 2.4 %
ERYTHROCYTE [DISTWIDTH] IN BLOOD BY AUTOMATED COUNT: 13.3 %
GLUCOSE BLD-MCNC: 121 MG/DL (ref 70–99)
GLUCOSE BLD-MCNC: 122 MG/DL (ref 70–99)
GLUCOSE BLD-MCNC: 90 MG/DL (ref 70–99)
GLUCOSE BLD-MCNC: 94 MG/DL (ref 70–99)
HCT VFR BLD AUTO: 33.4 %
HGB BLD-MCNC: 11 G/DL
IMM GRANULOCYTES # BLD AUTO: 0.07 X10(3) UL (ref 0–1)
IMM GRANULOCYTES NFR BLD: 0.7 %
LYMPHOCYTES # BLD AUTO: 1.41 X10(3) UL (ref 1–4)
LYMPHOCYTES NFR BLD AUTO: 14.3 %
MAGNESIUM SERPL-MCNC: 1.2 MG/DL (ref 1.6–2.6)
MCH RBC QN AUTO: 30 PG (ref 26–34)
MCHC RBC AUTO-ENTMCNC: 32.9 G/DL (ref 31–37)
MCV RBC AUTO: 91 FL
MONOCYTES # BLD AUTO: 0.77 X10(3) UL (ref 0.1–1)
MONOCYTES NFR BLD AUTO: 7.8 %
NEUTROPHILS # BLD AUTO: 7.3 X10 (3) UL (ref 1.5–7.7)
NEUTROPHILS # BLD AUTO: 7.3 X10(3) UL (ref 1.5–7.7)
NEUTROPHILS NFR BLD AUTO: 74.3 %
OSMOLALITY SERPL CALC.SUM OF ELEC: 294 MOSM/KG (ref 275–295)
PLATELET # BLD AUTO: 476 10(3)UL (ref 150–450)
POTASSIUM SERPL-SCNC: 3.6 MMOL/L (ref 3.5–5.1)
POTASSIUM SERPL-SCNC: 3.6 MMOL/L (ref 3.5–5.1)
RBC # BLD AUTO: 3.67 X10(6)UL
SODIUM SERPL-SCNC: 141 MMOL/L (ref 136–145)
WBC # BLD AUTO: 9.8 X10(3) UL (ref 4–11)

## 2024-07-31 PROCEDURE — 82962 GLUCOSE BLOOD TEST: CPT

## 2024-07-31 PROCEDURE — 92526 ORAL FUNCTION THERAPY: CPT

## 2024-07-31 PROCEDURE — 80048 BASIC METABOLIC PNL TOTAL CA: CPT | Performed by: UROLOGY

## 2024-07-31 PROCEDURE — 83735 ASSAY OF MAGNESIUM: CPT | Performed by: UROLOGY

## 2024-07-31 PROCEDURE — 85025 COMPLETE CBC W/AUTO DIFF WBC: CPT | Performed by: UROLOGY

## 2024-07-31 PROCEDURE — 83735 ASSAY OF MAGNESIUM: CPT | Performed by: STUDENT IN AN ORGANIZED HEALTH CARE EDUCATION/TRAINING PROGRAM

## 2024-07-31 PROCEDURE — 84132 ASSAY OF SERUM POTASSIUM: CPT | Performed by: STUDENT IN AN ORGANIZED HEALTH CARE EDUCATION/TRAINING PROGRAM

## 2024-07-31 RX ORDER — MAGNESIUM OXIDE 400 MG/1
800 TABLET ORAL ONCE
Status: COMPLETED | OUTPATIENT
Start: 2024-07-31 | End: 2024-07-31

## 2024-07-31 RX ORDER — CALCIUM CARBONATE 500 MG/1
500 TABLET, CHEWABLE ORAL 3 TIMES DAILY PRN
Status: DISCONTINUED | OUTPATIENT
Start: 2024-07-31 | End: 2024-08-01

## 2024-07-31 RX ORDER — POTASSIUM CHLORIDE 1.5 G/1.58G
40 POWDER, FOR SOLUTION ORAL EVERY 4 HOURS
Status: DISPENSED | OUTPATIENT
Start: 2024-07-31 | End: 2024-07-31

## 2024-07-31 RX ORDER — SULFAMETHOXAZOLE AND TRIMETHOPRIM 800; 160 MG/1; MG/1
1 TABLET ORAL 2 TIMES DAILY
Qty: 10 TABLET | Refills: 0 | Status: SHIPPED | OUTPATIENT
Start: 2024-07-31 | End: 2024-08-05

## 2024-07-31 NOTE — CM/SW NOTE
Sent updates to [facility].    SW/CM to remain available for support and/or discharge planning.    Vero Webb FRANCOISE  Discharge Planner  [phone]

## 2024-07-31 NOTE — PROGRESS NOTES
[facility]   part of [facility] Infectious Disease  Progress Note    Jose Ruvalcaba Patient Status:  Inpatient    1970 MRN [id number]   Location [facility] 5NW-A Attending Pepe Cason,    Hosp Day # 5 PCP Rick Lawson MD     Subjective:  Patient seen and examined sitting up in bed. Reports minimal overnight change. Tolerating course of IV antibiotics. Denies F/C. Denies n/v/d. Otherwise no new complaints.      Objective:  Blood pressure 140/84, pulse 90, temperature 98.8 °F (37.1 °C), temperature source Oral, resp. rate 16, height 6' 2\" (1.88 m), weight 216 lb 0.8 oz (98 kg), SpO2 95%.    Intake/Output:    Intake/Output Summary (Last 24 hours) at 2024 0842  Last data filed at 2024 0609  Gross per 24 hour   Intake 700 ml   Output 1300 ml   Net -600 ml       Physical Exam:  General: Awake, alert, non-tox, NAD.  HEENT:  Oropharynx clear, trachea ML.  Heart: RRR S1S2 no murmurs.  Lungs: Essentially CTA b/l, no rhonchi, rales, wheezes.  Abdomen: Soft, NT/ND.  BS present.  No organomegaly.  Extremity: No edema.  Neurological: No focal deficits.  Derm:  Warm and dry.    Lab Data Review:  Lab Results   Component Value Date    K 4.3 2024        Cultures:  Hospital Encounter on 24   1. Blood Culture     Status: None    Collection Time: 24 10:34 PM    Specimen: Blood,peripheral   Result Value Ref Range    Blood Culture Result No Growth 5 Days N/A   2. Urine Culture, Routine     Status: Abnormal    Collection Time: 24 10:33 PM    Specimen: Urine, clean catch   Result Value Ref Range    Urine Culture 10,000 - 50,000 CFU/ML Proteus mirabilis ESBL Pos (A) N/A       Susceptibility    Proteus mirabilis ESBL Pos -  (no method available)     Cefazolin 8 Sensitive      Cefepime  Resistant      Ceftazidime  Resistant      Ceftriaxone <=1 Sensitive      Ciprofloxacin >=4 Resistant      Gentamicin <=1 Sensitive      Meropenem <=0.25 Sensitive       Levofloxacin >=8 Resistant      Nitrofurantoin 128 Resistant      Trimethoprim/Sulfa 40 Sensitive        Radiology:  XR ABDOMEN (1 VIEW) (CPT=74018)    Result Date: 7/29/2024  CONCLUSION:  See above.   LOCATION:  [facility]   Dictated by (CST): Stromberg, LeRoy, MD on 7/29/2024 at 1:45 PM     Finalized by (CST): Stromberg, LeRoy, MD on 7/29/2024 at 1:47 PM       XR VIDEO SWALLOW (CPT=74230)    Result Date: 7/29/2024  PROCEDURE:  XR VIDEO SWALLOW (CPT=74230)  TECHNIQUE:  Video fluoroscopic swallowing study was performed in cooperation with the speech pathologist.  Barium of varying consistencies was administered orally with patient in lateral projection.  COMPARISON:  [facility] , XR, XR VIDEO SWALLOW (CPT=74230), 2/16/2023, 1:33 PM.  INDICATIONS:  assess swallow physiology  PATIENT STATED HISTORY: (As transcribed by Technologist)  patient unable to provide much history. history of stroke and dysphagia. patient has had pneumonia.   CINE CAPTURES:  4 FLUORSCOPY TIME:  3 minutes 17 seconds RADIATION DOSE (AIR KERMA PRODUCT):  300.0   FINDINGS:  PHARYNGEAL PHASE:  Delayed ASPIRATION:  None. PENETRATION:  Normal.  PLEASE SEE SPEECH PATHOLOGIST REPORT FOR FULL ASSESSMENT OF SWALLOWING MECHANISM.   LOCATION:  [facility]    Dictated by (CST): Brigette Hurtado MD on 7/29/2024 at 10:51 AM     Finalized by (CST): Brigette Hurtado MD on 7/29/2024 at 10:52 AM          Assessment and Plan:  1.  Acute complicated R sided pyelonephritis in the setting of obstructive nephrolithiasis  - This is in the setting of a chronic suprapubic catheter.  - Urine culture isolating ESBL Proteus mirabilis -- susceptible to Bactrim.  - Historically cultures had isolated ESBL Proteus mirabilis and Enterococcus facialis.  - IV meropenem, ongoing.    2.  Obstructive R-sided nephrolithiasis  - S/p cystoscopy and stent placement on 7/27/24.  - Urology following.    3.  TOBIN on CKD  - Resolved.  - As per the primary team.    4.  Thrush  - On PO fluconazole.    5.  Recs  -  Continue IV meropenem while inpatient.  Plan to discharge on PO Bactrim DS 1 tablet twice daily when ready to finish a total of 10 days through 8/5/24.  Order and med rec.  - Continue PO fluconazole for a total of 7 days through today, 7/31/24.  - F/u WBC and fever curve.  - Supportive care as per the primary team.  - Patient stable for discharge from an ID perspective pending clearance from other care teams.  - F/u with ID 1 week after discharge or sooner if necessary.  - Discussed plan of care with nursing.  - Discussed plan of care with patient.  All questions addressed understanding verbalized.  - ID to sign off from patient care at this time.  Please feel free to contact/reconsult should there be any questions or changes in patient clinical status.    Discussed case with ID attending/collaborating physician, Dr. Gaston Puentes, who is in agreement with the above plan of care.    Please note that this report has been produced using speech recognition software and may contain errors related to that system including, but not limited to, errors in grammar, punctuation, and spelling, as well as words and phrases that possibly may have been recognized inappropriately.  If there are any questions or concerns, contact the dictating provider for clarification.     The 21st Century Cures Act makes medical notes like these available to patients in the interest of transparency. Please be advised this is a medical document. Medical documents are intended to carry relevant information, facts as evident, and the clinical opinion of the practitioner. The medical note is intended as peer to peer communication and may appear blunt or direct. It is written in medical language and may contain abbreviations or verbiage that are unfamiliar.     If you have any questions or concerns please call [facility] Infectious Disease at [phone].     Prudencio Mg, APRN    7/31/2024  8:42 AM

## 2024-07-31 NOTE — PAYOR COMM NOTE
--------------  7/30:  CONTINUED STAY REVIEW    Payor: Psychiatric  Subscriber #:  [id number]  Authorization Number: [id number]    Admit date: 7/26/24  Admit time:  2:02 AM      HOSPITALIST:    Jose Ruvalcaba  1/11/1970     SUBJECTIVE:  Patient seen and examined.  When I evaluated the patient at bedside, he was eating lunch by himself.  No episodes of nausea and vomiting..       Recent Labs   Lab 07/25/24  2233 07/26/24  1035 07/27/24  0902 07/28/24  0636 07/29/24  0611 07/30/24  0720   WBC 13.1* 10.5 7.7 7.9 8.9 8.8   HGB 11.7* 10.8* 10.2* 10.5* 10.6* 10.8*   MCV 87.7 91.6 90.2 90.5 90.6 92.2   .0 416.0 398.0 433.0 459.0* 480.0*   INR 2.31*  --   --   --   --   --                     Recent Labs   Lab 07/26/24  1035 07/27/24  0902 07/28/24  0636 07/28/24  1633 07/29/24  0227 07/29/24  0611 07/29/24  1607 07/30/24  0720    140  140 142  --   --  140  --  141   K 4.2 3.5  3.5 3.4*  3.4* 3.4* 4.0 3.6 3.9 3.4*    109  109 110  --   --  107  --  107   CO2 22.0 24.0  24.0 26.0  --   --  27.0  --  29.0   BUN 31* 30*  30* 21  --   --  15  --  15   CREATSERUM 1.70* 1.52*  1.52* 1.06  --   --  0.91  --  0.98   CA 9.4 9.0  9.0 9.3  --   --  9.6  --  9.4   MG 2.0 2.2 1.6  --   --  1.2*  --  1.2*  1.2*   * 116*  116* 146*  --   --  98  --  90          54 year old male with PMH sig for 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     Sepsis POA 2/2 CAUTI +/- RML PNA  - given hx of MDRO, started on meropenem  -Urine cultures ESBL, proteus mirabilis,   - PNA work up negative, likely all urine infection  - d/w ID, recommendations noted     R distal ureter stone with hydroureter  - urology consulted  - s/p cystoscopy and stent placement 7/27  - monitor for postobstructive diureis  - cont IVF     Nausea and vomiting-improving  -  X-ray obtained to rule out obstruction-nonspecific bowel gas pattern, has been having bowel movement as per RN  - CT from 7/26 reviewed  - EKG within normal  - Possible gastroparesis, trial of Reglan     Oral thrush  - fluconazole per ID     Acute Renal Injury - resolved  - prerenal, infection  - cont isotonic crystalloids     CVA   - resulting in R hemiparesis, nonverbal     Suprapubic cath  - routine cares     G tube  - routine cares     Essential HTN  - coreg, amlodipine, hydralazine     Hyperlipidemia  - statin     Remote hx of LLE DVT in 2023   - on xarelto   - certainly high risk of VTE, but given hx of hemorrhagic stroke the dosing should probably be revisited by PCP     FEN: Tfs, PT/OT, diet per speech  Proph: SCDs, xarelto     Dispo -anticipate discharge tomorrow        Pepe Cason DO  [facility]  [facility]        Infectious Disease Progress Note        Date of admission: 7/25/2024  9:57 PM      Reason for consult: Catheter associated UTI, pyelonephritis     Subjective: Doing about the same.  No abdominal pain.  No nausea or vomiting.  No diarrhea.  Pretty sleepy today as he has not slept well over the last day     The rest of the systems were reviewed and found to be negative except was mentioned above     Interval events: This is a 54-year-old male patient with history of stroke, currently bedbound with a suprapubic catheter, presents here with high fevers in the setting of a catheter associated UTI.  Patient had grown ESBL producing organisms along with Enterococcus faecalis in the past.  Currently on IV meropenem, currently culture growing Proteus mirabilis, susceptibilities pending  CT now showing obstructing right-sided nephrolithiasis, status post cystoscopy and stenting on 7/27/2024       Recommendations:    Continue IV meropenem while inpatient  Plan to discharge patient on Bactrim DS 1 tablet twice a day to finish a total of 10 days from 7/27, that is through 8/5/24 when  ready  Continue p.o. fluconazole for total of 7 days through 7/31/2024  Continue to monitor daily labs for antibiotic toxicities  Further recommendations will depend on the above work-up and clinical progress      The plan of care was discussed with the primary hospital team, Pepe Cason DO     Recommendations were also discussed with the patient; all questions were answered.     Thank you for this consultation. Please don't hesitate to call the ID team for questions or any acute changes in patient's clinical condition.     MEDICATIONS ADMINISTERED IN LAST 1 DAY:  amLODIPine (Norvasc) tab 10 mg       Date Action Dose Route User    7/30/2024 1711 Given 10 mg Per G Tube Jennie Arce RN          ascorbic acid (Vitamin C) tab 500 mg       Date Action Dose Route User    7/30/2024 0946 Given 500 mg Per G Tube Jennie Arce RN          atorvastatin (Lipitor) tab 40 mg       Date Action Dose Route User    7/30/2024 2050 Given 40 mg Per G Tube Shraddha Navarro RN          buPROPion (Wellbutrin) tab 100 mg       Date Action Dose Route User    7/30/2024 2050 Given 100 mg Per G Tube Shraddha Navarro RN    7/30/2024 1712 Given 100 mg Per G Tube Jennie Arce RN    7/30/2024 0946 Given 100 mg Per G Tube Jennie Arce RN          carvedilol (Coreg) tab 25 mg       Date Action Dose Route User    7/30/2024 1711 Given 25 mg Per G Tube Jennie Arce RN    7/30/2024 0946 Given 25 mg Per G Tube Jennie Arce RN          fluconazole (Diflucan) tab 200 mg       Date Action Dose Route User    7/30/2024 2300 Given 200 mg Per G Tube Shraddha Navarro RN          hydrALAZINE (Apresoline) tab 100 mg       Date Action Dose Route User    7/30/2024 2050 Given 100 mg Per G Tube Shraddha Navarro RN    7/30/2024 1711 Given 100 mg Per G Tube Jennie rAce RN    7/30/2024 0946 Given 100 mg Per G Tube Jennie Arce RN          lisinopril (Prinivil; Zestril) tab 20 mg       Date Action Dose Route User    7/30/2024 0946 Given 20 mg  Oral Jennie Arce RN          magnesium oxide (Mag-Ox) tab 800 mg       Date Action Dose Route User    7/30/2024 0946 Given 800 mg Oral Jennie Arce RN          meropenem (Merrem) 500 mg in sodium chloride 0.9% 100 mL IVPB-MBP       Date Action Dose Route User    7/31/2024 0608 New Bag 500 mg Intravenous Shraddha Navarro RN    7/30/2024 2049 New Bag 500 mg Intravenous Shraddha Navarro RN    7/30/2024 1351 New Bag 500 mg Intravenous Jennie Arce RN          metoclopramide (Reglan) 5 mg/mL injection 10 mg       Date Action Dose Route User    7/30/2024 2336 Given 10 mg Intravenous Shraddha Navarro RN    7/30/2024 1707 Given 10 mg Intravenous Jennie Arce RN    7/30/2024 0946 Given 10 mg Intravenous Jennie Arce RN          potassium chloride (Klor-Con) 20 MEQ oral powder 40 mEq       Date Action Dose Route User    7/30/2024 1245 Given 40 mEq Oral Jennie Arce RN    7/30/2024 0946 Given 40 mEq Oral Jennie Arce RN          rivaroxaban (Xarelto) tab 20 mg       Date Action Dose Route User    7/30/2024 0946 Given 20 mg Per G Tube Jennie Arce RN          terazosin (Hytrin) cap 10 mg       Date Action Dose Route User    7/30/2024 2050 Given 10 mg Per G Tube Shraddha Navarro RN            Vitals (last day)       Date/Time Temp Pulse Resp BP SpO2 Weight O2 Device O2 Flow Rate (L/min) [facility]    07/31/24 0700 98.8 °F (37.1 °C) 90 16 140/84 95 % -- None (Room air) 0 L/min JE    07/31/24 0609 98.6 °F (37 °C) 81 16 133/92 96 % -- None (Room air) -- SC    07/30/24 2329 98 °F (36.7 °C) 86 18 136/77 93 % -- None (Room air) -- AN    07/30/24 1523 97.8 °F (36.6 °C) 80 18 142/84 97 % -- None (Room air) 0 L/min     07/30/24 1118 97.6 °F (36.4 °C) 75 18 128/66 93 % -- None (Room air) 0 L/min     07/30/24 0714 98.4 °F (36.9 °C) 86 18 146/85 94 % -- None (Room air) 0 L/min     07/30/24 0454 98.7 °F (37.1 °C) 73 16 140/74 95 % -- None (Room air) -- SC

## 2024-07-31 NOTE — PROGRESS NOTES
Assumed care of pt around 0730. Pt AO x-4. Delayed responses, mumbled speech at times. RS weakness/residual, hx CVA, aspiration precautions. VSS on RA. Tele SR. Kaufman at bedside, pt self suctions prn. Chronic suprapubic. Scheduled reglan. 3x episode emesis, pt does c/o nausea this am. IV ABX. IV protonix added. Calorie count. Peg tube, meds. Pt max assist. Updated on poc

## 2024-07-31 NOTE — PLAN OF CARE
Pt Aox3-4, Delayed responses/mumbled speech at times, nods. R sided wknss baseline(hx cva). Aspiration precaution in place. O2 wnl on RA. Pt oral suctions himself. NSR on tele, EP cardiac, SCDs. Chronic suprapubic catheter. BB. Calorie count, G tube  to LUQ. Q6 accucheck. IV abx. Meds per Gtube. Mepilex on sacrum. Denies pain. Pt updated on POC. Call light within reach, safety precautions in place. POC continues.       Problem: Patient/Family Goals  Goal: Patient/Family Long Term Goal  Description: Patient's Long Term Goal: Go home with adequate resources    Interventions:  - IV Abx  - Identify barriers to discharge w/pt and caregiver  - Include patient/family/discharge partner in discharge planning  - Arrange for needed discharge resources and transportation as appropriate  - Identify discharge learning needs   - Consider post-discharge preferences of patient/family/discharge partner  - Assess patient's ability to be responsible for managing their own health  - Refer to Case Management Department for coordinating discharge planning if the patient needs post-hospital services  - See additional Care Plan goals for specific interventions  Outcome: Progressing  Goal: Patient/Family Short Term Goal  Description: Patient's Short Term Goal:   7/26 AM: Tube feedings  07/26/2024 - control nausea  07/27/2024  - tolerate  gtube feeding  7/28: speech eval  07/28/2024 - \"sleep\"  7/29: relief of nausea and vomiting  7/29noc: sleep, improve nausea  Interventions:   - monitor gastric residuals  - compazine and Zofran as ordered.   - HOB above 30 degrees.  Check residuals, monitor/medicate for nausea, gradually increase rate as tolerated to a goal of 70  - cluster care, dim lights, keep room quiet, manage pain/discomfort  -   - See additional Care Plan goals for specific interventions  Outcome: Progressing     Problem: RESPIRATORY - ADULT  Goal: Achieves optimal ventilation and oxygenation  Description: INTERVENTIONS:  - Assess for  changes in respiratory status  - Assess for changes in mentation and behavior  - Position to facilitate oxygenation and minimize respiratory effort  - Oxygen supplementation based on oxygen saturation or ABGs  - Provide Smoking Cessation handout, if applicable  - Encourage broncho-pulmonary hygiene including cough, deep breathe, Incentive Spirometry  - Assess the need for suctioning and perform as needed  - Assess and instruct to report SOB or any respiratory difficulty  - Respiratory Therapy support as indicated  - Manage/alleviate anxiety  - Monitor for signs/symptoms of CO2 retention  Outcome: Progressing     Problem: GASTROINTESTINAL - ADULT  Goal: Minimal or absence of nausea and vomiting  Description: INTERVENTIONS:  - Maintain adequate hydration with IV or PO as ordered and tolerated  - Nasogastric tube to low intermittent suction as ordered  - Evaluate effectiveness of ordered antiemetic medications  - Provide nonpharmacologic comfort measures as appropriate  - Advance diet as tolerated, if ordered  - Obtain nutritional consult as needed  - Evaluate fluid balance  Outcome: Progressing  Goal: Maintains adequate nutritional intake (undernourished)  Description: INTERVENTIONS:  - Monitor percentage of each meal consumed  - Identify factors contributing to decreased intake, treat as appropriate  - Assist with meals as needed  - Monitor I&O, WT and lab values  - Obtain nutritional consult as needed  - Optimize oral hygiene and moisture  - Encourage food from home; allow for food preferences  - Enhance eating environment  Outcome: Progressing     Problem: GENITOURINARY - ADULT  Goal: Absence of urinary retention  Description: INTERVENTIONS:  - Assess patient’s ability to void and empty bladder  - Monitor intake/output and perform bladder scan as needed  - Follow urinary retention protocol/standard of care  - Consider collaborating with pharmacy to review patient's medication profile  - Implement strategies to  promote bladder emptying  Outcome: Progressing     Problem: METABOLIC/FLUID AND ELECTROLYTES - ADULT  Goal: Glucose maintained within prescribed range  Description: INTERVENTIONS:  - Monitor Blood Glucose as ordered  - Assess for signs and symptoms of hyperglycemia and hypoglycemia  - Administer ordered medications to maintain glucose within target range  - Assess barriers to adequate nutritional intake and initiate nutrition consult as needed  - Instruct patient on self management of diabetes  Outcome: Progressing     Problem: SKIN/TISSUE INTEGRITY - ADULT  Goal: Skin integrity remains intact  Description: INTERVENTIONS  - Assess and document risk factors for pressure ulcer development  - Assess and document skin integrity  - Monitor for areas of redness and/or skin breakdown  - Initiate interventions, skin care algorithm/standards of care as needed  Outcome: Progressing

## 2024-07-31 NOTE — SLP NOTE
SPEECH DAILY NOTE - INPATIENT    ASSESSMENT & PLAN   ASSESSMENT  Pt seen for dysphagia tx to assess tolerance with recommended diet, ensure proper utilization of aspiration precautions, and provide pt/family education. Pt seen upright in bed suctioning himself. Pt expectorating thick, clear secretions prior to PO intake. SLP setup breakfast tray of soft and easy to chew solids and thin liquids. Following first bite of solids, pt ejected solid d/t secretions making it difficult for him to swallow. SLP assisted pt with suctioning which appeared to benefit swallowing function. SLP implemented aspiration precautions during breakfast. Pt noted to be bolus holding with solids and liquids. Mastication and AP transit were prolonged, but functional. No overt s/s of aspiration were observed. SLP to follow up x1 to monitor diet tolerance and reinforce aspiration precautions.       Diet Recommendations - Solids: Soft/ Easy to chew  Diet Recommendations - Liquids: Thin Liquids    Compensatory Strategies Recommended: No straws;Slow rate;Alternate consistencies;Small bites and sips;Extra sauce/gravy  Aspiration Precautions: Slow rate;Upright position;Small bites and sips;No straw  Medication Administration Recommendations: One pill at a time (take whole or crushed in pureed if any difficulty)    Patient Experiencing Pain: No                Treatment Plan  Treatment Plan/Recommendations: Dysphagia therapy;Aspiration precautions    Interdisciplinary Communication: Discussed with RN            GOALS  Goal #1 The patient will tolerate soft/easy to chew consistency and thin liquids without overt signs or symptoms of aspiration with 95 % accuracy over 1 session(s).  In Progress   Goal #2 The patient/family/caregiver will demonstrate understanding and implementation of aspiration precautions and swallow strategies independently over 1 session(s).     In Progress   Goal #3 The patient will utilize compensatory strategies as outlined by   VFSS including Slow rate, Small bites, Small sips, Alternate liquids/solids, No straws, Upright 90 degrees, Supervision with meals with as needed assistance 100 % of the time across 2 sessions.  In Progress                                               FOLLOW UP  Follow Up Needed (Documentation Required): Yes  SLP Follow-up Date: 08/02/24  Number of Visits to Meet Established Goals: 2    Session: 1    If you have any questions, please contact Rizwana LUQUE SLP

## 2024-08-01 VITALS
RESPIRATION RATE: 18 BRPM | BODY MASS INDEX: 27.73 KG/M2 | SYSTOLIC BLOOD PRESSURE: 134 MMHG | HEIGHT: 74 IN | DIASTOLIC BLOOD PRESSURE: 70 MMHG | OXYGEN SATURATION: 99 % | HEART RATE: 75 BPM | WEIGHT: 216.06 LBS | TEMPERATURE: 99 F

## 2024-08-01 LAB
ANION GAP SERPL CALC-SCNC: 6 MMOL/L (ref 0–18)
BASOPHILS # BLD AUTO: 0.05 X10(3) UL (ref 0–0.2)
BASOPHILS NFR BLD AUTO: 0.5 %
BUN BLD-MCNC: 20 MG/DL (ref 9–23)
CALCIUM BLD-MCNC: 9.4 MG/DL (ref 8.7–10.4)
CHLORIDE SERPL-SCNC: 109 MMOL/L (ref 98–112)
CO2 SERPL-SCNC: 27 MMOL/L (ref 21–32)
CREAT BLD-MCNC: 0.97 MG/DL
EGFRCR SERPLBLD CKD-EPI 2021: 92 ML/MIN/1.73M2 (ref 60–?)
EOSINOPHIL # BLD AUTO: 0.29 X10(3) UL (ref 0–0.7)
EOSINOPHIL NFR BLD AUTO: 3 %
ERYTHROCYTE [DISTWIDTH] IN BLOOD BY AUTOMATED COUNT: 13.5 %
GLUCOSE BLD-MCNC: 102 MG/DL (ref 70–99)
GLUCOSE BLD-MCNC: 105 MG/DL (ref 70–99)
GLUCOSE BLD-MCNC: 115 MG/DL (ref 70–99)
GLUCOSE BLD-MCNC: 92 MG/DL (ref 70–99)
HCT VFR BLD AUTO: 32.8 %
HGB BLD-MCNC: 10.7 G/DL
IMM GRANULOCYTES # BLD AUTO: 0.06 X10(3) UL (ref 0–1)
IMM GRANULOCYTES NFR BLD: 0.6 %
LYMPHOCYTES # BLD AUTO: 1.49 X10(3) UL (ref 1–4)
LYMPHOCYTES NFR BLD AUTO: 15.3 %
MAGNESIUM SERPL-MCNC: 1.4 MG/DL (ref 1.6–2.6)
MCH RBC QN AUTO: 29.6 PG (ref 26–34)
MCHC RBC AUTO-ENTMCNC: 32.6 G/DL (ref 31–37)
MCV RBC AUTO: 90.9 FL
MONOCYTES # BLD AUTO: 0.69 X10(3) UL (ref 0.1–1)
MONOCYTES NFR BLD AUTO: 7.1 %
NEUTROPHILS # BLD AUTO: 7.18 X10 (3) UL (ref 1.5–7.7)
NEUTROPHILS # BLD AUTO: 7.18 X10(3) UL (ref 1.5–7.7)
NEUTROPHILS NFR BLD AUTO: 73.5 %
OSMOLALITY SERPL CALC.SUM OF ELEC: 296 MOSM/KG (ref 275–295)
PLATELET # BLD AUTO: 475 10(3)UL (ref 150–450)
POTASSIUM SERPL-SCNC: 3.4 MMOL/L (ref 3.5–5.1)
POTASSIUM SERPL-SCNC: 4 MMOL/L (ref 3.5–5.1)
RBC # BLD AUTO: 3.61 X10(6)UL
SODIUM SERPL-SCNC: 142 MMOL/L (ref 136–145)
WBC # BLD AUTO: 9.8 X10(3) UL (ref 4–11)

## 2024-08-01 PROCEDURE — 80048 BASIC METABOLIC PNL TOTAL CA: CPT | Performed by: UROLOGY

## 2024-08-01 PROCEDURE — 85025 COMPLETE CBC W/AUTO DIFF WBC: CPT | Performed by: UROLOGY

## 2024-08-01 PROCEDURE — 84132 ASSAY OF SERUM POTASSIUM: CPT | Performed by: STUDENT IN AN ORGANIZED HEALTH CARE EDUCATION/TRAINING PROGRAM

## 2024-08-01 PROCEDURE — 82962 GLUCOSE BLOOD TEST: CPT

## 2024-08-01 PROCEDURE — 83735 ASSAY OF MAGNESIUM: CPT | Performed by: STUDENT IN AN ORGANIZED HEALTH CARE EDUCATION/TRAINING PROGRAM

## 2024-08-01 RX ORDER — POTASSIUM CHLORIDE 1.5 G/1.58G
40 POWDER, FOR SOLUTION ORAL EVERY 4 HOURS
Status: COMPLETED | OUTPATIENT
Start: 2024-08-01 | End: 2024-08-01

## 2024-08-01 RX ORDER — MAGNESIUM OXIDE 400 MG/1
800 TABLET ORAL ONCE
Status: COMPLETED | OUTPATIENT
Start: 2024-08-01 | End: 2024-08-01

## 2024-08-01 RX ORDER — POTASSIUM CHLORIDE 20 MEQ/1
40 TABLET, EXTENDED RELEASE ORAL EVERY 4 HOURS
Status: DISCONTINUED | OUTPATIENT
Start: 2024-08-01 | End: 2024-08-01

## 2024-08-01 RX ORDER — OMEPRAZOLE 40 MG/1
40 CAPSULE, DELAYED RELEASE ORAL DAILY
Qty: 30 CAPSULE | Refills: 0 | Status: SHIPPED | OUTPATIENT
Start: 2024-08-01 | End: 2024-08-01

## 2024-08-01 RX ORDER — MAGNESIUM OXIDE 400 MG/1
800 TABLET ORAL ONCE
Status: DISCONTINUED | OUTPATIENT
Start: 2024-08-01 | End: 2024-08-01

## 2024-08-01 RX ORDER — CALCIUM CARBONATE 500 MG/1
500 TABLET, CHEWABLE ORAL 3 TIMES DAILY PRN
Status: SHIPPED | COMMUNITY
Start: 2024-08-01

## 2024-08-01 NOTE — PLAN OF CARE
Patient alert, hx CVA difficulty speaking. Nods head appropriately. Glasses. Room air. Cough. Yaunker. Tele NSR. Xarelto. Suprapubic cath. Denies nausea this morning. Mepilex to sacrum. Max assist. IV merrem. Meds via G tube. Calorie count completed per nutrition. Soft diet, 1:1 feed. No straws. Holding TF at this time. Patient rounded on routinely. Patient and family updated on plan of care.      Problem: Patient/Family Goals  Goal: Patient/Family Long Term Goal  Description: Patient's Long Term Goal: Go home with adequate resources    Interventions:  - IV Abx  - Identify barriers to discharge w/pt and caregiver  - Include patient/family/discharge partner in discharge planning  - Arrange for needed discharge resources and transportation as appropriate  - Identify discharge learning needs   - Consider post-discharge preferences of patient/family/discharge partner  - Assess patient's ability to be responsible for managing their own health  - Refer to Case Management Department for coordinating discharge planning if the patient needs post-hospital services  - See additional Care Plan goals for specific interventions  Outcome: Progressing  Goal: Patient/Family Short Term Goal  Description: Patient's Short Term Goal:   7/26 AM: Tube feedings  07/26/2024 - control nausea  07/27/2024  - tolerate  gtube feeding  7/28: speech eval  07/28/2024 - \"sleep\"  7/29: relief of nausea and vomiting  7/29noc: sleep, improve nausea  7/31noc: sleep, improve nausea  Interventions:   - monitor gastric residuals  - compazine and Zofran as ordered.   - HOB above 30 degrees.  Check residuals, monitor/medicate for nausea, gradually increase rate as tolerated to a goal of 70  - cluster care, dim lights, keep room quiet, manage pain/discomfort  -   - See additional Care Plan goals for specific interventions  Outcome: Progressing     Problem: RESPIRATORY - ADULT  Goal: Achieves optimal ventilation and oxygenation  Description: INTERVENTIONS:  -  Assess for changes in respiratory status  - Assess for changes in mentation and behavior  - Position to facilitate oxygenation and minimize respiratory effort  - Oxygen supplementation based on oxygen saturation or ABGs  - Provide Smoking Cessation handout, if applicable  - Encourage broncho-pulmonary hygiene including cough, deep breathe, Incentive Spirometry  - Assess the need for suctioning and perform as needed  - Assess and instruct to report SOB or any respiratory difficulty  - Respiratory Therapy support as indicated  - Manage/alleviate anxiety  - Monitor for signs/symptoms of CO2 retention  Outcome: Progressing     Problem: GASTROINTESTINAL - ADULT  Goal: Minimal or absence of nausea and vomiting  Description: INTERVENTIONS:  - Maintain adequate hydration with IV or PO as ordered and tolerated  - Nasogastric tube to low intermittent suction as ordered  - Evaluate effectiveness of ordered antiemetic medications  - Provide nonpharmacologic comfort measures as appropriate  - Advance diet as tolerated, if ordered  - Obtain nutritional consult as needed  - Evaluate fluid balance  Outcome: Progressing  Goal: Maintains adequate nutritional intake (undernourished)  Description: INTERVENTIONS:  - Monitor percentage of each meal consumed  - Identify factors contributing to decreased intake, treat as appropriate  - Assist with meals as needed  - Monitor I&O, WT and lab values  - Obtain nutritional consult as needed  - Optimize oral hygiene and moisture  - Encourage food from home; allow for food preferences  - Enhance eating environment  Outcome: Progressing     Problem: GENITOURINARY - ADULT  Goal: Absence of urinary retention  Description: INTERVENTIONS:  - Assess patient’s ability to void and empty bladder  - Monitor intake/output and perform bladder scan as needed  - Follow urinary retention protocol/standard of care  - Consider collaborating with pharmacy to review patient's medication profile  - Implement  strategies to promote bladder emptying  Outcome: Progressing     Problem: METABOLIC/FLUID AND ELECTROLYTES - ADULT  Goal: Glucose maintained within prescribed range  Description: INTERVENTIONS:  - Monitor Blood Glucose as ordered  - Assess for signs and symptoms of hyperglycemia and hypoglycemia  - Administer ordered medications to maintain glucose within target range  - Assess barriers to adequate nutritional intake and initiate nutrition consult as needed  - Instruct patient on self management of diabetes  Outcome: Progressing     Problem: SKIN/TISSUE INTEGRITY - ADULT  Goal: Skin integrity remains intact  Description: INTERVENTIONS  - Assess and document risk factors for pressure ulcer development  - Assess and document skin integrity  - Monitor for areas of redness and/or skin breakdown  - Initiate interventions, skin care algorithm/standards of care as needed  Outcome: Progressing

## 2024-08-01 NOTE — DIETARY NOTE
[facility]  NUTRITION ASSESSMENT    Pt does not meet malnutrition criteria at this time.    NUTRITION INTERVENTION:    Meals and Snacks - Continue Soft/Easy to Chew diet as tolerated per SLP; monitor po intake.  Medical Nutrition Supplements - RD added Ensure Plus HP TID.  Enteral Nutrition - Continue to hold TF as PO intake improves. To keep Gtube patent, recommend giving meds via Gtube or minimal FWF 30 ml q4hrs.    PATIENT STATUS:  8/1: Calorie count completed and pt meeting ~69% kcal and 77% protein needs over the past 4 days. At this time, recommend holding TF unless pt refusing PO intake. Per report, pt still having some nausea and 1 episode of emesis yesterday. Plan to add PPI and Tums prn.     7/30: After TF initiated on 7/26, pt was constantly complaining of nausea with TF only at 20 ml/hr so TF were intermittently held and only got up to 40 ml/hr on 7/28 and have been off since then. Noted pt went to OR 7/27 for cystoscopy, retrograde pyelogram, insertion R ureteral stent. SLP evaluated 7/28 and diet was advanced to soft/easy to chew solids and thin liquids. Pt had multiple episodes of emesis yesterday not associated with oral intake so AXR ordered to r/o obstruction which was negative. Possible gastroparesis so reglan initiated yesterday. Visited pt at bedside. He was able to nod head yes and no but otherwise unable to communicate. He reports feeling better today with no N/V since yesterday. Was eating lunch during visit and reports appetite is better today. Discussed adding ONS with all meals and pt reports liking all flavors. Calorie count ordered yesterday to determine if TF warranted. Noted nursing home regimen was based on percentage of meals eaten:  0-25% = Jevity 1.5 - 474 ml  26-50% = Jevity 1.5 - 355 ml  51-75% = Jevity 1.5 237 ml  >75% = no bolus.  7/26: 54 y/o male from [place] admitted with fever and hypotension per ED notes. No H&P yet. Received nutrition consult for G-tube with  feedings. Pt on bolus feeds at NH but about infused depended on average PO intake. However, pt has not been taking PO orally due to thrush. TF ordered to provided 100% of nutrition at this time. Will monitor PO intakes and adjust TF's, as needed.     PMH: CVA, aphasia, G-tube    ANTHROPOMETRICS:  Ht:  6' 2\"  Wt: 98 kg (216 lb 0.8 oz).   BMI: Body mass index is 27.74 kg/m².  IBW: 86.4 kg    WEIGHT HISTORY:   Noted no significant wt changes per EMR hx.  Wt Readings from Last 10 Encounters:   07/26/24 98 kg (216 lb 0.8 oz)   02/22/24 98 kg (216 lb 0.8 oz)   05/26/23 98 kg (216 lb)   05/22/23 98 kg (216 lb)   05/13/23 95.3 kg (210 lb)   05/05/23 99.8 kg (220 lb)   04/26/23 100.1 kg (220 lb 10.9 oz)   04/14/23 94 kg (207 lb 3.7 oz)   02/09/23 93 kg (205 lb 0.4 oz)   11/30/22 96.6 kg (213 lb)      NUTRITION:  Diet:       Procedures    Regular/General diet Fluid Consistency: Thin Liquids ; Texture Consistency: Soft / Easy to Chew ; Is Patient on Accuchecks? Yes     Food Allergies: No  Cultural/Ethnic/Hoahaoism Preferences Addressed: Yes    Percent Meals Eaten (last 3 days)       Date/Time Percent Meals Eaten (%)    07/29/24 0900 40 %    07/29/24 1337 30 %    07/30/24 1400 80 %    07/30/24 1725 60 %          Calorie Count:  7/28:  B- NPO  L- 503 kcal, 27 gm pro  D- 386 kcal, 22 gm pro    7/29:  B- 138 kcal, 4 gm pro  L- 187 kcal, 7 gm pro  D- no meal recorded    7/30:  B- 764 kcal, 27 gm pro  L- 500 kcal, 32 gm pro  D- 862 kcal, 37 gm pro    7/31:  B- 385 kcal, 20 gm pro  L- 794 kcal, 46 gm pro  D- 633 kcal, 26 gm pro    Avg per meal: 515 kcal, 25 gm protein (meeting ~69% kcal and 77% protein needs).    GI system review: emesis and nausea, +Gtube; last BM 7/31  Skin and wounds: buttocks wound    NUTRITION RELATED PHYSICAL FINDINGS:     1. Body Fat/Muscle Mass: no wasting noted      2. Fluid Accumulation: lower extremity edema and b/l feet    NUTRITION PRESCRIPTION: 98 kg (7/26)  Calories: 9472-8908 calories/day (23-28  calories per kg)  Protein:  grams protein/day ( 1-1.2  grams protein per kg)  Fluid: ~1 ml/kcal or per MD discretion    NUTRITION DIAGNOSIS/PROBLEM:  Inadequate oral intake related to insufficient appetite resulting in inadequate nutrition intake and inability to take or tolerate PO as evidenced by documented/reported insufficient oral intake      MONITOR AND EVALUATE/NUTRITION GOALS:  PO intake of 75% of meals TID - Met, continues  PO intake of 75% of oral nutrition supplement/s - Met, continues  Weight stable within 1 to 2 lbs during admission - Ongoing  Tolerate alternative nutrition at 100% of goal - Resolved      MEDICATIONS:  Vit C 500 mg, colace, abx, multivitamin with minerals, protonix, prn Tums    LABS:  K+ 3.4, Mg 1.4    Pt is at Moderate nutrition risk    Oliva Fierro RD, LDN, [facility]  Clinical Dietitian  Spectra: [phone]

## 2024-08-01 NOTE — PLAN OF CARE
Problem: Patient/Family Goals  Goal: Patient/Family Long Term Goal  Description: Patient's Long Term Goal: Go home with adequate resources    Interventions:  - IV Abx  - Identify barriers to discharge w/pt and caregiver  - Include patient/family/discharge partner in discharge planning  - Arrange for needed discharge resources and transportation as appropriate  - Identify discharge learning needs   - Consider post-discharge preferences of patient/family/discharge partner  - Assess patient's ability to be responsible for managing their own health  - Refer to Case Management Department for coordinating discharge planning if the patient needs post-hospital services  - See additional Care Plan goals for specific interventions  8/1/2024 1522 by Marisol Nova, RN  Outcome: Completed  8/1/2024 1103 by Marisol Nova, RN  Outcome: Progressing  Goal: Patient/Family Short Term Goal  Description: Patient's Short Term Goal:   7/26 AM: Tube feedings  07/26/2024 - control nausea  07/27/2024  - tolerate  gtube feeding  7/28: speech eval  07/28/2024 - \"sleep\"  7/29: relief of nausea and vomiting  7/29noc: sleep, improve nausea  7/31noc: sleep, improve nausea  Interventions:   - monitor gastric residuals  - compazine and Zofran as ordered.   - HOB above 30 degrees.  Check residuals, monitor/medicate for nausea, gradually increase rate as tolerated to a goal of 70  - cluster care, dim lights, keep room quiet, manage pain/discomfort  -   - See additional Care Plan goals for specific interventions  8/1/2024 1522 by Marisol Nova, RN  Outcome: Completed  8/1/2024 1103 by Marisol Nova, RN  Outcome: Progressing     Problem: RESPIRATORY - ADULT  Goal: Achieves optimal ventilation and oxygenation  Description: INTERVENTIONS:  - Assess for changes in respiratory status  - Assess for changes in mentation and behavior  - Position to facilitate oxygenation and minimize respiratory effort  - Oxygen supplementation based on oxygen saturation or ABGs  -  Provide Smoking Cessation handout, if applicable  - Encourage broncho-pulmonary hygiene including cough, deep breathe, Incentive Spirometry  - Assess the need for suctioning and perform as needed  - Assess and instruct to report SOB or any respiratory difficulty  - Respiratory Therapy support as indicated  - Manage/alleviate anxiety  - Monitor for signs/symptoms of CO2 retention  8/1/2024 1522 by Marisol Nova RN  Outcome: Completed  8/1/2024 1103 by Marisol Nova RN  Outcome: Progressing     Problem: GASTROINTESTINAL - ADULT  Goal: Minimal or absence of nausea and vomiting  Description: INTERVENTIONS:  - Maintain adequate hydration with IV or PO as ordered and tolerated  - Nasogastric tube to low intermittent suction as ordered  - Evaluate effectiveness of ordered antiemetic medications  - Provide nonpharmacologic comfort measures as appropriate  - Advance diet as tolerated, if ordered  - Obtain nutritional consult as needed  - Evaluate fluid balance  8/1/2024 1522 by Marisol Nova RN  Outcome: Completed  8/1/2024 1103 by Marisol Nova RN  Outcome: Progressing  Goal: Maintains adequate nutritional intake (undernourished)  Description: INTERVENTIONS:  - Monitor percentage of each meal consumed  - Identify factors contributing to decreased intake, treat as appropriate  - Assist with meals as needed  - Monitor I&O, WT and lab values  - Obtain nutritional consult as needed  - Optimize oral hygiene and moisture  - Encourage food from home; allow for food preferences  - Enhance eating environment  8/1/2024 1522 by Marisol Nova RN  Outcome: Completed  8/1/2024 1103 by Marisol Nova RN  Outcome: Progressing     Problem: GENITOURINARY - ADULT  Goal: Absence of urinary retention  Description: INTERVENTIONS:  - Assess patient’s ability to void and empty bladder  - Monitor intake/output and perform bladder scan as needed  - Follow urinary retention protocol/standard of care  - Consider collaborating with pharmacy to review  patient's medication profile  - Implement strategies to promote bladder emptying  8/1/2024 1522 by Marisol Nova, RN  Outcome: Completed  8/1/2024 1103 by Marisol Nova RN  Outcome: Progressing     Problem: METABOLIC/FLUID AND ELECTROLYTES - ADULT  Goal: Glucose maintained within prescribed range  Description: INTERVENTIONS:  - Monitor Blood Glucose as ordered  - Assess for signs and symptoms of hyperglycemia and hypoglycemia  - Administer ordered medications to maintain glucose within target range  - Assess barriers to adequate nutritional intake and initiate nutrition consult as needed  - Instruct patient on self management of diabetes  8/1/2024 1522 by Marisol Nova, RN  Outcome: Completed  8/1/2024 1103 by Marisol Nova RN  Outcome: Progressing     Problem: SKIN/TISSUE INTEGRITY - ADULT  Goal: Skin integrity remains intact  Description: INTERVENTIONS  - Assess and document risk factors for pressure ulcer development  - Assess and document skin integrity  - Monitor for areas of redness and/or skin breakdown  - Initiate interventions, skin care algorithm/standards of care as needed  8/1/2024 1522 by Marisol Nova, RN  Outcome: Completed  8/1/2024 1103 by Marisol Nova, RN  Outcome: Progressing

## 2024-08-01 NOTE — SPIRITUAL CARE NOTE
Spiritual Care Visit Note    Patient Name: Jose Ruvalcaba Date of Spiritual Care Visit: 24   : 1970 Primary Dx: Nosocomial pneumonia       Referred By: Referral From: Other (Comment)    Spiritual Care Taxonomy:    Intended Effects: Promote a sense of peace    Methods: Offer support    Interventions: Silent prayer;Explain  role;Acknowledge current situation;Active listening;Ask guided questions    Visit Type/Summary:     - Spiritual Care: Consulted with RN prior to visit. Patient and family expressed appreciation for  visit.    Spiritual Care support can be requested via an Epic consult. For urgent/immediate needs, please contact the On Call  at: [facility]: ext [phone]

## 2024-08-01 NOTE — CM/SW NOTE
08/01/24 1400   Discharge disposition   Expected discharge disposition [facility] Term [facility]   Post Acute Care Provider Ashlie Iverson   Discharge transportation [facility]     Informed by RN that patient is medically cleared for discharge. Spoke with Carolina from [facility] who confirmed patient able to return today. Spoke with [facility] and placed ambulance on will call for today. PCS form completed and available for RN to print. SW will remain available.    Addendum 2:20pm: Arranged ambulance for 5pm . Spoke with SIENA Carrillo to provide update, he was agreeable with discharge back to [facility] today.       [facility]  Phone: [phone]    [facility]  [phone] or [phone]      EDGARD Tolliver  Discharge Planner  [phone]

## 2024-08-01 NOTE — PROGRESS NOTES
[facility]  Hospitalist Progress Note                                                                     [facility]   part of [facility]        Jose Ruvalcaba  1/11/1970    SUBJECTIVE:  Patient seen and examined.  Had one episode of vomiting. Endorsing nausea. Complains of some heartburn.    OBJECTIVE:  Temp:  [97.9 °F (36.6 °C)-98.8 °F (37.1 °C)] 98.1 °F (36.7 °C)  Pulse:  [78-99] 78  Resp:  [16-18] 18  BP: (112-141)/(64-92) 126/64  SpO2:  [93 %-96 %] 95 %  Exam  Gen: No acute distress, alert, no focal neurologic deficits, nonverbal, follows commands, answers yes or no questions  Neuro: Right-sided hemiparesis, baseline  Pulm: Lungs clear bilaterally, normal respiratory effort  CV: Heart with regular rate and rhythm, no murmur.  Normal PMI.    Abd: Abdomen soft, nontender, nondistended, no organomegaly, bowel sounds present, suprapubic TTP   MSK: Full range of motion in extremities, no clubbing, no cyanosis  Skin: no rashes or lesions,  Neuro: R hemiparesis baseline from previous CVA, nonverbal at times, answers yes or no questions,    Labs:   Recent Labs   Lab 07/25/24  2233 07/26/24  1035 07/27/24  0902 07/28/24  0636 07/29/24  0611 07/30/24  0720 07/31/24  0810   WBC 13.1*   < > 7.7 7.9 8.9 8.8 9.8   HGB 11.7*   < > 10.2* 10.5* 10.6* 10.8* 11.0*   MCV 87.7   < > 90.2 90.5 90.6 92.2 91.0   .0   < > 398.0 433.0 459.0* 480.0* 476.0*   INR 2.31*  --   --   --   --   --   --     < > = values in this interval not displayed.       Recent Labs   Lab 07/27/24  0902 07/28/24  0636 07/28/24  1633 07/29/24  0611 07/29/24  1607 07/30/24  0720 07/30/24  1555 07/31/24  0810 07/31/24  1728     140 142  --  140  --  141  --  141  --    K 3.5  3.5 3.4*  3.4*   < > 3.6 3.9 3.4* 4.3 3.6 3.6     109 110  --  107  --  107  --  108  --    CO2 24.0  24.0 26.0  --  27.0  --  29.0  --  26.0  --    BUN 30*  30* 21  --  15  --  15  --  19  --     CREATSERUM 1.52*  1.52* 1.06  --  0.91  --  0.98  --  0.92  --    CA 9.0  9.0 9.3  --  9.6  --  9.4  --  9.4  --    MG 2.2 1.6  --  1.2*  --  1.2*  1.2*  --  1.2*  --    *  116* 146*  --  98  --  90  --  94  --     < > = values in this interval not displayed.       Recent Labs   Lab 07/25/24  2233 07/27/24  0902   ALT 28 37   AST 28 42*   ALB 4.1 3.8       Recent Labs   Lab 07/30/24  1628 07/30/24  2337 07/31/24  0557 07/31/24  1155 07/31/24  1518   PGLU 100* 102* 121* 122* 90       Meds:   Scheduled:    pantoprazole  40 mg Intravenous Daily    multivitamin with minerals  1 tablet Oral Daily    lisinopril  20 mg Oral Daily    amLODIPine  10 mg Per G Tube QPM    ascorbic acid  500 mg Per G Tube Daily    atorvastatin  40 mg Per G Tube Nightly    carvedilol  25 mg Per G Tube BID with meals    docusate  100 mg Per G Tube BID    terazosin  10 mg Per G Tube Nightly    hydrALAZINE  100 mg Per G Tube TID    rivaroxaban  20 mg Per G Tube Daily    meropenem  500 mg Intravenous Q8H    buPROPion  100 mg Per G Tube TID    fluconazole  200 mg Per G Tube Daily     Continuous Infusions:    dextrose 10%       PRN:   calcium carbonate    guaiFENesin    acetaminophen    ondansetron    dextrose 10%    lipase-protease-amylase (Lip-Prot-Amyl) **AND** sodium bicarbonate    Assessment/Plan:  Principal Problem:    Nosocomial pneumonia  Active Problems:    Urinary tract infection associated with cystostomy catheter, initial encounter ([facility])    Hyponatremia    Thrush, oral     54 year old male with PMH sig for 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     Sepsis POA 2/2 CAUTI +/- RML PNA- resolved  - given hx of MDRO, started on meropenem  -Urine cultures ESBL, proteus mirabilis,   - PNA work up negative, likely all urine infection  - d/w ID, recommendations noted, ID cleared for dc    R  distal ureter stone with hydroureter  - urology consulted  - s/p cystoscopy and stent placement 7/27  - monitor for postobstructive diureis  - cont IVF     Nausea and vomiting-improving  - X-ray obtained to rule out obstruction-nonspecific bowel gas pattern, has been having bowel movement as per RN  - CT from 7/26 reviewed  - EKG within normal  - Possible gastroparesis, trial of Reglan- improved with reglan however again with nausea despite reglan  - Possible gastritis, given patient has been taking more po intake and not using the tube feeds  - will do a trial of PO PPI and Tums prn     Oral thrush  - fluconazole per ID     Acute Renal Injury - resolved  - prerenal, infection  - cont isotonic crystalloids     CVA   - resulting in R hemiparesis, nonverbal     Suprapubic cath  - routine cares     G tube  - routine cares     Essential HTN  - coreg, amlodipine, hydralazine     Hyperlipidemia  - statin    Remote hx of LLE DVT in 2023   - on xarelto   - certainly high risk of VTE, but given hx of hemorrhagic stroke the dosing should probably be revisited by PCP     FEN: Tfs, PT/OT, diet per speech  Proph: SCDs, xarelto    Dispo -anticipate discharge when vomiting resolves       Pepe Cason, DO  Hospitalist  [facility]

## 2024-08-01 NOTE — PAYOR COMM NOTE
--------------  ONGOING N/V REQUIRING IV MEDS      7/31 CONTINUED STAY REVIEW    Payor: [facility]  Subscriber #:  [id number]  Authorization Number: [id number]    NURSING:    3x episode emesis, pt does c/o nausea this am. IV ABX. IV protonix added. Calorie count.       HOSPITALIST:    Had one episode of vomiting. Endorsing nausea. Complains of some heartburn.     OBJECTIVE:  Temp:  [97.9 °F (36.6 °C)-98.8 °F (37.1 °C)] 98.1 °F (36.7 °C)  Pulse:  [78-99] 78  Resp:  [16-18] 18  BP: (112-141)/(64-92) 126/64  SpO2:  [93 %-96 %] 95 %  Exam  Gen: No acute distress, alert, no focal neurologic deficits, nonverbal, follows commands, answers yes or no questions  Neuro: Right-sided hemiparesis, baseline  Pulm: Lungs clear bilaterally, normal respiratory effort  CV: Heart with regular rate and rhythm, no murmur.  Normal PMI.    Abd: Abdomen soft, nontender, nondistended, no organomegaly, bowel sounds present, suprapubic TTP   MSK: Full range of motion in extremities, no clubbing, no cyanosis    Lab 07/25/24  2233 07/27/24  0902 07/28/24  0636 07/29/24  0611 07/30/24  0720 07/31/24  0810   WBC 13.1* 7.7 7.9 8.9 8.8 9.8   HGB 11.7* 10.2* 10.5* 10.6* 10.8* 11.0*   MCV 87.7 90.2 90.5 90.6 92.2 91.0   .0 398.0 433.0 459.0* 480.0* 476.0*   INR 2.31*  --   --   --   --   --     < > = values in this interval not displayed.                     Recent Labs   Lab 07/27/24  0902 07/28/24  0636 07/28/24  1633 07/29/24  0611 07/29/24  1607 07/30/24  0720 07/30/24  1555 07/31/24  0810 07/31/24  1728     140 142  --  140  --  141  --  141  --    K 3.5  3.5 3.4*  3.4*   < > 3.6 3.9 3.4* 4.3 3.6 3.6          Scheduled:   Scheduled Medications[]Expand by Default    pantoprazole  40 mg Intravenous Daily    multivitamin with minerals  1 tablet Oral Daily    lisinopril  20 mg Oral Daily    amLODIPine  10 mg Per G Tube QPM    ascorbic acid  500 mg Per G Tube Daily    atorvastatin  40 mg Per G Tube  Nightly    carvedilol  25 mg Per G Tube BID with meals    docusate  100 mg Per G Tube BID    terazosin  10 mg Per G Tube Nightly    hydrALAZINE  100 mg Per G Tube TID    rivaroxaban  20 mg Per G Tube Daily    meropenem  500 mg Intravenous Q8H    buPROPion  100 mg Per G Tube TID    fluconazole  200 mg Per G Tube Daily           Assessment/Plan:  Principal Problem:    Nosocomial pneumonia  Active Problems:    Urinary tract infection associated with cystostomy catheter, initial encounter ([facility])    Hyponatremia    Thrush, oral      54 year old male with PMH sig for 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     Sepsis POA 2/2 CAUTI +/- RML PNA- resolved  - given hx of MDRO, started on meropenem  -Urine cultures ESBL, proteus mirabilis,   - PNA work up negative, likely all urine infection  - d/w ID, recommendations noted, ID cleared for dc     R distal ureter stone with hydroureter  - urology consulted  - s/p cystoscopy and stent placement 7/27  - monitor for postobstructive diureis  - cont IVF     Nausea and vomiting-improving  - X-ray obtained to rule out obstruction-nonspecific bowel gas pattern, has been having bowel movement as per RN  - CT from 7/26 reviewed  - EKG within normal  - Possible gastroparesis, trial of Reglan- improved with reglan however again with nausea despite reglan  - Possible gastritis, given patient has been taking more po intake and not using the tube feeds  - will do a trial of PO PPI and Tums prn      Oral thrush  - fluconazole per ID     Acute Renal Injury - resolved  - prerenal, infection  - cont isotonic crystalloids     CVA   - resulting in R hemiparesis, nonverbal     Suprapubic cath  - routine cares     G tube  - routine cares     Essential HTN  - coreg, amlodipine, hydralazine     Hyperlipidemia  - statin     Remote hx of LLE DVT in 2023   - on  xarelto   - certainly high risk of VTE, but given hx of hemorrhagic stroke the dosing should probably be revisited by PCP     FEN: Tfs, PT/OT, diet per speech  Proph: SCDs, xarelto     Dispo -anticipate discharge when vomiting resolves       MEDICATIONS ADMINISTERED IN LAST 1 DAY:  calcium carbonate (Tums) chewable tab 500 mg       Date Action Dose Route User    7/31/2024 1540 Given 500 mg Oral Jennie Arce RN       meropenem (Merrem) 500 mg in sodium chloride 0.9% 100 mL IVPB-MBP       Date Action Dose Route User    8/1/2024 0546 New Bag 500 mg Intravenous Shraddha Navarro RN    7/31/2024 2135 New Bag 500 mg Intravenous Shraddha Navarro RN    7/31/2024 1417 New Bag 500 mg Intravenous Jennie Arce RN          metoclopramide (Reglan) 5 mg/mL injection 10 mg       Date Action Dose Route User    7/31/2024 1027 Given 10 mg Intravenous Jennie Arce RN          ondansetron (Zofran) 4 MG/2ML injection 4 mg       Date Action Dose Route User    7/31/2024 1147 Given 4 mg Intravenous Jennie Arce RN          pantoprazole (Protonix) 40 mg in sodium chloride 0.9% PF 10 mL IV push       Date Action Dose Route User    7/31/2024 1545 Given 40 mg Intravenous Jennie Arce RN

## 2024-08-01 NOTE — PLAN OF CARE
Pt Aox3-4, Delayed responses/mumbled speech at times, nods. R sided wknss baseline(hx cva). Aspiration precaution in place. O2 wnl on RA. Pt oral suctions himself. NSR on tele, EP cardiac, SCDs. Chronic suprapubic catheter. BB. Calorie count, G tube to LUQ. Q6 accucheck. IV abx. Meds per Gtube. Mepilex on sacrum. Denies pain/nausea. Pt updated on POC. Call light within reach, safety precautions in place. POC continues.       Problem: Patient/Family Goals  Goal: Patient/Family Long Term Goal  Description: Patient's Long Term Goal: Go home with adequate resources    Interventions:  - IV Abx  - Identify barriers to discharge w/pt and caregiver  - Include patient/family/discharge partner in discharge planning  - Arrange for needed discharge resources and transportation as appropriate  - Identify discharge learning needs   - Consider post-discharge preferences of patient/family/discharge partner  - Assess patient's ability to be responsible for managing their own health  - Refer to Case Management Department for coordinating discharge planning if the patient needs post-hospital services  - See additional Care Plan goals for specific interventions  Outcome: Progressing  Goal: Patient/Family Short Term Goal  Description: Patient's Short Term Goal:   7/26 AM: Tube feedings  07/26/2024 - control nausea  07/27/2024  - tolerate  gtube feeding  7/28: speech eval  07/28/2024 - \"sleep\"  7/29: relief of nausea and vomiting  7/29noc: sleep, improve nausea  7/31noc: sleep, improve nausea  Interventions:   - monitor gastric residuals  - compazine and Zofran as ordered.   - HOB above 30 degrees.  Check residuals, monitor/medicate for nausea, gradually increase rate as tolerated to a goal of 70  - cluster care, dim lights, keep room quiet, manage pain/discomfort  -   - See additional Care Plan goals for specific interventions  Outcome: Progressing     Problem: RESPIRATORY - ADULT  Goal: Achieves optimal ventilation and  oxygenation  Description: INTERVENTIONS:  - Assess for changes in respiratory status  - Assess for changes in mentation and behavior  - Position to facilitate oxygenation and minimize respiratory effort  - Oxygen supplementation based on oxygen saturation or ABGs  - Provide Smoking Cessation handout, if applicable  - Encourage broncho-pulmonary hygiene including cough, deep breathe, Incentive Spirometry  - Assess the need for suctioning and perform as needed  - Assess and instruct to report SOB or any respiratory difficulty  - Respiratory Therapy support as indicated  - Manage/alleviate anxiety  - Monitor for signs/symptoms of CO2 retention  Outcome: Progressing     Problem: GASTROINTESTINAL - ADULT  Goal: Minimal or absence of nausea and vomiting  Description: INTERVENTIONS:  - Maintain adequate hydration with IV or PO as ordered and tolerated  - Nasogastric tube to low intermittent suction as ordered  - Evaluate effectiveness of ordered antiemetic medications  - Provide nonpharmacologic comfort measures as appropriate  - Advance diet as tolerated, if ordered  - Obtain nutritional consult as needed  - Evaluate fluid balance  Outcome: Progressing  Goal: Maintains adequate nutritional intake (undernourished)  Description: INTERVENTIONS:  - Monitor percentage of each meal consumed  - Identify factors contributing to decreased intake, treat as appropriate  - Assist with meals as needed  - Monitor I&O, WT and lab values  - Obtain nutritional consult as needed  - Optimize oral hygiene and moisture  - Encourage food from home; allow for food preferences  - Enhance eating environment  Outcome: Progressing     Problem: GENITOURINARY - ADULT  Goal: Absence of urinary retention  Description: INTERVENTIONS:  - Assess patient’s ability to void and empty bladder  - Monitor intake/output and perform bladder scan as needed  - Follow urinary retention protocol/standard of care  - Consider collaborating with pharmacy to review  patient's medication profile  - Implement strategies to promote bladder emptying  Outcome: Progressing     Problem: METABOLIC/FLUID AND ELECTROLYTES - ADULT  Goal: Glucose maintained within prescribed range  Description: INTERVENTIONS:  - Monitor Blood Glucose as ordered  - Assess for signs and symptoms of hyperglycemia and hypoglycemia  - Administer ordered medications to maintain glucose within target range  - Assess barriers to adequate nutritional intake and initiate nutrition consult as needed  - Instruct patient on self management of diabetes  Outcome: Progressing     Problem: SKIN/TISSUE INTEGRITY - ADULT  Goal: Skin integrity remains intact  Description: INTERVENTIONS  - Assess and document risk factors for pressure ulcer development  - Assess and document skin integrity  - Monitor for areas of redness and/or skin breakdown  - Initiate interventions, skin care algorithm/standards of care as needed  Outcome: Progressing

## 2024-08-01 NOTE — PLAN OF CARE
NURSING DISCHARGE NOTE    Discharged Nursing home via Ambulance.  Accompanied by Support staff  Belongings Taken by patient/family.    Patient discharged in stable condition. Clarified with MD for discharge order of omeprazole to be given through PEG. Paperwork sent with patient. IV and tele removed. Bedside belongings taken.    Attempted to call report at [phone], left a message with number. Spoke to Oscar, report given and questions answered.

## 2024-08-02 NOTE — PAYOR COMM NOTE
Discharge Notification    Patient Name: RAS AKINS CHRIST  Payor: [facility]  Subscriber #: [id number]  Authorization Number: [id number]  Admit Date/Time: 7/25/2024 9:57 PM  Discharge Date/Time: 8/1/2024 5:59 PM

## 2024-10-16 ENCOUNTER — ANESTHESIA EVENT (OUTPATIENT)
Dept: SURGERY | Facility: HOSPITAL | Age: 54
End: 2024-10-16
Payer: MEDICARE

## 2024-10-16 RX ORDER — MIDAZOLAM HYDROCHLORIDE 2 MG/ML
7.5 SYRUP ORAL 3 TIMES DAILY
COMMUNITY

## 2024-10-16 RX ORDER — METHYLPHENIDATE HYDROCHLORIDE 5 MG/1
5 TABLET ORAL DAILY
COMMUNITY
Start: 2024-10-02

## 2024-10-16 RX ORDER — CLONIDINE HYDROCHLORIDE 0.1 MG/1
0.1 TABLET ORAL 2 TIMES DAILY
COMMUNITY
Start: 2024-08-28

## 2024-10-16 RX ORDER — MAGNESIUM OXIDE 400 MG/1
400 TABLET ORAL DAILY
COMMUNITY

## 2024-10-16 RX ORDER — ARIPIPRAZOLE 15 MG/1
40 TABLET ORAL DAILY
COMMUNITY
Start: 2024-06-09

## 2024-10-17 ENCOUNTER — ANESTHESIA (OUTPATIENT)
Dept: SURGERY | Facility: HOSPITAL | Age: 54
End: 2024-10-17
Payer: MEDICARE

## 2024-10-17 ENCOUNTER — APPOINTMENT (OUTPATIENT)
Dept: GENERAL RADIOLOGY | Facility: HOSPITAL | Age: 54
End: 2024-10-17
Attending: UROLOGY
Payer: MEDICARE

## 2024-10-17 ENCOUNTER — HOSPITAL ENCOUNTER (OUTPATIENT)
Facility: HOSPITAL | Age: 54
Setting detail: HOSPITAL OUTPATIENT SURGERY
Discharge: HOME | End: 2024-10-17
Attending: UROLOGY | Admitting: UROLOGY
Payer: MEDICARE

## 2024-10-17 VITALS
HEIGHT: 66 IN | HEART RATE: 71 BPM | DIASTOLIC BLOOD PRESSURE: 87 MMHG | BODY MASS INDEX: 34.72 KG/M2 | SYSTOLIC BLOOD PRESSURE: 170 MMHG | RESPIRATION RATE: 16 BRPM | OXYGEN SATURATION: 99 % | TEMPERATURE: 98 F | WEIGHT: 216 LBS

## 2024-10-17 PROCEDURE — 0T768DZ DILATION OF RIGHT URETER WITH INTRALUMINAL DEVICE, VIA NATURAL OR ARTIFICIAL OPENING ENDOSCOPIC: ICD-10-PCS | Performed by: UROLOGY

## 2024-10-17 PROCEDURE — 88300 SURGICAL PATH GROSS: CPT | Performed by: UROLOGY

## 2024-10-17 PROCEDURE — 0TC08ZZ EXTIRPATION OF MATTER FROM RIGHT KIDNEY, VIA NATURAL OR ARTIFICIAL OPENING ENDOSCOPIC: ICD-10-PCS | Performed by: UROLOGY

## 2024-10-17 PROCEDURE — 0TP98DZ REMOVAL OF INTRALUMINAL DEVICE FROM URETER, VIA NATURAL OR ARTIFICIAL OPENING ENDOSCOPIC: ICD-10-PCS | Performed by: UROLOGY

## 2024-10-17 PROCEDURE — BT1D1ZZ FLUOROSCOPY OF RIGHT KIDNEY, URETER AND BLADDER USING LOW OSMOLAR CONTRAST: ICD-10-PCS | Performed by: UROLOGY

## 2024-10-17 PROCEDURE — 82365 CALCULUS SPECTROSCOPY: CPT | Performed by: UROLOGY

## 2024-10-17 DEVICE — URETERAL STENT
Type: IMPLANTABLE DEVICE | Site: URETER | Status: FUNCTIONAL
Brand: ASCERTA™

## 2024-10-17 RX ORDER — SULFAMETHOXAZOLE AND TRIMETHOPRIM 800; 160 MG/1; MG/1
1 TABLET ORAL 2 TIMES DAILY
Status: SHIPPED | COMMUNITY
Start: 2024-10-17

## 2024-10-17 RX ORDER — ROCURONIUM BROMIDE 10 MG/ML
INJECTION, SOLUTION INTRAVENOUS AS NEEDED
Status: DISCONTINUED | OUTPATIENT
Start: 2024-10-17 | End: 2024-10-17 | Stop reason: SURG

## 2024-10-17 RX ORDER — ACETAMINOPHEN 500 MG
1000 TABLET ORAL ONCE
Status: DISCONTINUED | OUTPATIENT
Start: 2024-10-17 | End: 2024-10-17 | Stop reason: HOSPADM

## 2024-10-17 RX ORDER — HYDROMORPHONE HYDROCHLORIDE 1 MG/ML
0.6 INJECTION, SOLUTION INTRAMUSCULAR; INTRAVENOUS; SUBCUTANEOUS EVERY 5 MIN PRN
Status: DISCONTINUED | OUTPATIENT
Start: 2024-10-17 | End: 2024-10-17

## 2024-10-17 RX ORDER — DEXAMETHASONE SODIUM PHOSPHATE 4 MG/ML
VIAL (ML) INJECTION AS NEEDED
Status: DISCONTINUED | OUTPATIENT
Start: 2024-10-17 | End: 2024-10-17 | Stop reason: SURG

## 2024-10-17 RX ORDER — ONDANSETRON 2 MG/ML
INJECTION INTRAMUSCULAR; INTRAVENOUS AS NEEDED
Status: DISCONTINUED | OUTPATIENT
Start: 2024-10-17 | End: 2024-10-17 | Stop reason: SURG

## 2024-10-17 RX ORDER — HYDROCODONE BITARTRATE AND ACETAMINOPHEN 5; 325 MG/1; MG/1
2 TABLET ORAL ONCE AS NEEDED
Status: DISCONTINUED | OUTPATIENT
Start: 2024-10-17 | End: 2024-10-17

## 2024-10-17 RX ORDER — GLYCOPYRROLATE 0.2 MG/ML
INJECTION, SOLUTION INTRAMUSCULAR; INTRAVENOUS AS NEEDED
Status: DISCONTINUED | OUTPATIENT
Start: 2024-10-17 | End: 2024-10-17 | Stop reason: SURG

## 2024-10-17 RX ORDER — KETOROLAC TROMETHAMINE 30 MG/ML
INJECTION, SOLUTION INTRAMUSCULAR; INTRAVENOUS AS NEEDED
Status: DISCONTINUED | OUTPATIENT
Start: 2024-10-17 | End: 2024-10-17 | Stop reason: SURG

## 2024-10-17 RX ORDER — HYDROCODONE BITARTRATE AND ACETAMINOPHEN 5; 325 MG/1; MG/1
1 TABLET ORAL ONCE AS NEEDED
Status: DISCONTINUED | OUTPATIENT
Start: 2024-10-17 | End: 2024-10-17

## 2024-10-17 RX ORDER — NALOXONE HYDROCHLORIDE 0.4 MG/ML
0.08 INJECTION, SOLUTION INTRAMUSCULAR; INTRAVENOUS; SUBCUTANEOUS AS NEEDED
Status: DISCONTINUED | OUTPATIENT
Start: 2024-10-17 | End: 2024-10-17

## 2024-10-17 RX ORDER — HYDROMORPHONE HYDROCHLORIDE 1 MG/ML
0.4 INJECTION, SOLUTION INTRAMUSCULAR; INTRAVENOUS; SUBCUTANEOUS EVERY 5 MIN PRN
Status: DISCONTINUED | OUTPATIENT
Start: 2024-10-17 | End: 2024-10-17

## 2024-10-17 RX ORDER — LIDOCAINE HYDROCHLORIDE 20 MG/ML
JELLY TOPICAL AS NEEDED
Status: DISCONTINUED | OUTPATIENT
Start: 2024-10-17 | End: 2024-10-17 | Stop reason: HOSPADM

## 2024-10-17 RX ORDER — SODIUM CHLORIDE, SODIUM LACTATE, POTASSIUM CHLORIDE, CALCIUM CHLORIDE 600; 310; 30; 20 MG/100ML; MG/100ML; MG/100ML; MG/100ML
INJECTION, SOLUTION INTRAVENOUS CONTINUOUS
Status: DISCONTINUED | OUTPATIENT
Start: 2024-10-17 | End: 2024-10-17

## 2024-10-17 RX ORDER — PROCHLORPERAZINE EDISYLATE 5 MG/ML
INJECTION INTRAMUSCULAR; INTRAVENOUS
Status: COMPLETED
Start: 2024-10-17 | End: 2024-10-17

## 2024-10-17 RX ORDER — ONDANSETRON 2 MG/ML
INJECTION INTRAMUSCULAR; INTRAVENOUS
Status: COMPLETED
Start: 2024-10-17 | End: 2024-10-17

## 2024-10-17 RX ORDER — NEOSTIGMINE METHYLSULFATE 1 MG/ML
INJECTION INTRAVENOUS AS NEEDED
Status: DISCONTINUED | OUTPATIENT
Start: 2024-10-17 | End: 2024-10-17 | Stop reason: SURG

## 2024-10-17 RX ORDER — ONDANSETRON 2 MG/ML
4 INJECTION INTRAMUSCULAR; INTRAVENOUS EVERY 6 HOURS PRN
Status: DISCONTINUED | OUTPATIENT
Start: 2024-10-17 | End: 2024-10-17

## 2024-10-17 RX ORDER — SCOLOPAMINE TRANSDERMAL SYSTEM 1 MG/1
1 PATCH, EXTENDED RELEASE TRANSDERMAL ONCE
Status: DISCONTINUED | OUTPATIENT
Start: 2024-10-17 | End: 2024-10-17 | Stop reason: HOSPADM

## 2024-10-17 RX ORDER — PROCHLORPERAZINE EDISYLATE 5 MG/ML
5 INJECTION INTRAMUSCULAR; INTRAVENOUS EVERY 8 HOURS PRN
Status: DISCONTINUED | OUTPATIENT
Start: 2024-10-17 | End: 2024-10-17

## 2024-10-17 RX ORDER — PHENYLEPHRINE HCL 10 MG/ML
VIAL (ML) INJECTION AS NEEDED
Status: DISCONTINUED | OUTPATIENT
Start: 2024-10-17 | End: 2024-10-17 | Stop reason: SURG

## 2024-10-17 RX ORDER — ACETAMINOPHEN 500 MG
1000 TABLET ORAL ONCE AS NEEDED
Status: DISCONTINUED | OUTPATIENT
Start: 2024-10-17 | End: 2024-10-17

## 2024-10-17 RX ORDER — LIDOCAINE HYDROCHLORIDE 10 MG/ML
INJECTION, SOLUTION EPIDURAL; INFILTRATION; INTRACAUDAL; PERINEURAL AS NEEDED
Status: DISCONTINUED | OUTPATIENT
Start: 2024-10-17 | End: 2024-10-17 | Stop reason: SURG

## 2024-10-17 RX ORDER — HYDROMORPHONE HYDROCHLORIDE 1 MG/ML
0.2 INJECTION, SOLUTION INTRAMUSCULAR; INTRAVENOUS; SUBCUTANEOUS EVERY 5 MIN PRN
Status: DISCONTINUED | OUTPATIENT
Start: 2024-10-17 | End: 2024-10-17

## 2024-10-17 RX ADMIN — SODIUM CHLORIDE, SODIUM LACTATE, POTASSIUM CHLORIDE, CALCIUM CHLORIDE: 600; 310; 30; 20 INJECTION, SOLUTION INTRAVENOUS at 09:20:00

## 2024-10-17 RX ADMIN — PHENYLEPHRINE HCL 100 MCG: 10 MG/ML VIAL (ML) INJECTION at 10:09:00

## 2024-10-17 RX ADMIN — GLYCOPYRROLATE 0.8 MG: 0.2 INJECTION, SOLUTION INTRAMUSCULAR; INTRAVENOUS at 10:20:00

## 2024-10-17 RX ADMIN — KETOROLAC TROMETHAMINE 30 MG: 30 INJECTION, SOLUTION INTRAMUSCULAR; INTRAVENOUS at 10:27:00

## 2024-10-17 RX ADMIN — LIDOCAINE HYDROCHLORIDE 50 MG: 10 INJECTION, SOLUTION EPIDURAL; INFILTRATION; INTRACAUDAL; PERINEURAL at 09:33:00

## 2024-10-17 RX ADMIN — NEOSTIGMINE METHYLSULFATE 5 MG: 1 INJECTION INTRAVENOUS at 10:20:00

## 2024-10-17 RX ADMIN — PHENYLEPHRINE HCL 100 MCG: 10 MG/ML VIAL (ML) INJECTION at 10:03:00

## 2024-10-17 RX ADMIN — ONDANSETRON 4 MG: 2 INJECTION INTRAMUSCULAR; INTRAVENOUS at 10:20:00

## 2024-10-17 RX ADMIN — PHENYLEPHRINE HCL 100 MCG: 10 MG/ML VIAL (ML) INJECTION at 09:58:00

## 2024-10-17 RX ADMIN — DEXAMETHASONE SODIUM PHOSPHATE 4 MG: 4 MG/ML VIAL (ML) INJECTION at 09:55:00

## 2024-10-17 RX ADMIN — ROCURONIUM BROMIDE 30 MG: 10 INJECTION, SOLUTION INTRAVENOUS at 09:33:00

## 2024-10-17 NOTE — H&P
History and Physical     Jose Ruvalcaba Patient Status:  Hospital Outpatient Surgery    1970 MRN [id number]   [facility] PERIOPERATIVE SERVICE Attending Checo Moreno MD   Hosp Day # 0 PCP Rick Lawson MD     Chief Complaint: right ureter stone    Subjective:    Jose Ruvalcaba is a 54 year old male who resides at a rehab facility due to a prior stroke.  Has right sided paralysis. Has SPT for bladder management.  Had G tube for meds although tells me he is able to eat and swallow too.  Has history of stone prior to his stroke.  Has ureter stent placed this summer for UTI and distal right ureter stone.  Has no pain from the stone.  Has SPT changed every 4-6 weeks.  Thinks the last change was about 3 weeks ago.  Here with fiance and brother.    History/Other:      Past Medical History:  Past Medical History:    Anxiety state    Aphasia    Blood disorder    THROMBOCYTOPENIA    Calculus of kidney    Deep vein thrombosis (HCC)    Depression    Dysphasia    Esophageal reflux    Essential hypertension    Feeding by G-tube (HCC)    Hemiparesis (HCC)    High blood pressure    High cholesterol    PEG (percutaneous endoscopic gastrostomy) status (HCC)    Problems with swallowing    feeding tube    Pulmonary embolism (HCC)    Renal disorder    Retinal detachment    Right hemiplegia (HCC)    Stroke (HCC)    RIGHT SIDED WEAKNESS    Visual impairment        Past Surgical History:   Past Surgical History:   Procedure Laterality Date    Cystoscopy,insert ureteral stent Right 2024    Cystourethroscopy  2022    bilateral retrograde pyelograms    Other surgical history      placement of Gtube       Social History:  reports that he has never smoked. He has never used smokeless tobacco. He reports that he does not currently use alcohol. He reports that he does not currently use drugs after having used the following drugs: Cannabis.    Family History:   Family History   Problem Relation Age of Onset     Cancer Father        Allergies: Allergies[1]    Medications:  Medications Ordered Prior to Encounter[2]    Review of Systems:   A comprehensive 14 point review of systems was completed.    Pertinent positives and negatives noted in the HPI.    Objective:     /66 (BP Location: Left arm)   Pulse 59   Temp 97.9 °F (36.6 °C) (Temporal)   Resp 16   Ht 5' 6\" (1.676 m)   Wt 216 lb (98 kg)   SpO2 98%   BMI 34.86 kg/m²   General: No acute distress.  Alert ,  Lets his family do most of the talking although he does nod in agreement to questions, etc.  Respiratory: No wheezes  Cardiovascular: Regular   Abdomen: Soft, has SPT, has g tube  Neurologic: has right arm and leg paralysis and contracture  Psychiatric: Appropriate mood and affect.  Integument: No rashes     Results:    Labs: outside labs reviewed  Proteus ESBL  Med list says he is currently on Bactrim until 10/19      Imaging: Imaging data reviewed in Epic.  7/26/24 CT  KIDNEYS:  Normal renal morphology.  Moderate hydroureteronephrosis of the right collecting system secondary to an obstructing calculus of the right distal ureter measuring 10 x 7 mm (series 301, image 107).  Few additional renal calculi are present bilaterally in the left lower pole.     7/27/24 ureter stent placed    Assessment & Plan:    #right ureter stone  S/p right ureter stent placement in July 2024  NGB managed with SPT      Plan of care discussed with patient, fiance, and brother    Will cont abx  Will change SPT   May have string stent  Fimatthew willing to remove if nursing at his facility cannot      Surgical risks, benefits and alternatives discussed.  Risks of ureteroscopy including but not limited to infection, bleeding, perforation, possible need for stent replacement, need for subsequent removal of stent, ureteral spasm, ureteral injury or stricture discussed with patient along with risks of anesthesia which could include death.       Patient will schedule cystoscopy, right  ureteroscopy, stone extraction, possible stent replacement and laser lithotripsy here today.     Checo Moreno MD  10/17/2024    Supplementary Documentation:                                      [1]   Allergies  Allergen Reactions    Augmentin [Amoxicillin-Pot Clavulanate] UNKNOWN     Per NH records; tolerates cephalosporins    Penicillins RASH     Per fiance at this time. Tolerates cephalosporins   [2]   Current Facility-Administered Medications on File Prior to Encounter   Medication Dose Route Frequency Provider Last Rate Last Admin    [COMPLETED] potassium chloride (Klor-Con) 20 MEQ oral powder 40 mEq  40 mEq Per G Tube Q4H Saiyad, Shahnur, DO   40 mEq at 24 1246    [COMPLETED] magnesium oxide (Mag-Ox) tab 800 mg  800 mg Per G Tube Once Saiyad, Shahnur, DO   800 mg at 24 0952    [COMPLETED] magnesium oxide (Mag-Ox) tab 800 mg  800 mg Oral Once Saiyad, Shahnur, DO   800 mg at 24 1029    [] potassium chloride (Klor-Con) 20 MEQ oral powder 40 mEq  40 mEq Oral Q4H Saiyad, Shahnur, DO   40 mEq at 24 1029    [COMPLETED] potassium chloride (Klor-Con) 20 MEQ oral powder 40 mEq  40 mEq Oral Q4H Saiyad, Shahnur, DO   40 mEq at 24 1245    [COMPLETED] magnesium oxide (Mag-Ox) tab 800 mg  800 mg Oral Once Saiyad, Shahnur, DO   800 mg at 24 0946    [COMPLETED] potassium chloride (Klor-Con) 20 MEQ oral powder 40 mEq  40 mEq Oral Q4H Saiyad, Shahnur, DO   40 mEq at 24 1104    [COMPLETED] magnesium oxide (Mag-Ox) tab 800 mg  800 mg Oral Once Saiyad, Shahnur, DO   800 mg at 24 0834    [COMPLETED] metoclopramide (Reglan) 5 mg/mL injection 10 mg  10 mg Intravenous Q8H Saiyad, Shahnur, DO   10 mg at 24 1027    [COMPLETED] magnesium oxide (Mag-Ox) tab 400 mg  400 mg Oral Once Rick Mcneil MD   400 mg at 24 0903    [COMPLETED] potassium chloride (Klor-Con M20) tab 40 mEq  40 mEq Oral Q4H Rick Mcneil MD   40 mEq at 24 1223    [COMPLETED]  potassium chloride (Klor-Con) 20 MEQ oral powder 40 mEq  40 mEq Oral Q4H Rick Mcneil MD   40 mEq at 24 2217    [COMPLETED] potassium chloride 40 mEq in 250mL sodium chloride 0.9% IVPB premix  40 mEq Intravenous Once Rick Mcneil MD 62.5 mL/hr at 24 1124 40 mEq at 24 1124    [] sodium chloride 0.9% infusion   Intravenous Continuous Oliva Clinton MD        [COMPLETED] sodium chloride 0.9 % IV bolus 500 mL  500 mL Intravenous Once Oliva Clinton MD   Stopped at 24 0154    [COMPLETED] meropenem (Merrem) 1 g in sodium chloride 0.9% 100 mL IVPB-MBP  1 g Intravenous Once Oliva Clinton MD   Stopped at 24 0100     Current Outpatient Medications on File Prior to Encounter   Medication Sig Dispense Refill    cloNIDine 0.1 MG Oral Tab 1 tablet (0.1 mg total) by Per G Tube route 2 (two) times daily. Hold SBP <110      magnesium oxide 400 MG Oral Tab Take 1 tablet (400 mg total) by mouth daily.      methylphenidate 5 MG Oral Tab Take 1 tablet (5 mg total) by mouth daily.      potassium chloride 40 meq/30 ml (10%) Oral Solution 30 mL (40 mEq total) by Per G Tube route daily.      Ferrous Sulfate 220 (44 Fe) MG/5ML Oral Solution 7.5 mL by Per G Tube route 3 (three) times daily.      calcium carbonate 500 MG Oral Chew Tab Chew 1 tablet (500 mg total) by mouth 3 (three) times daily as needed.      [] sulfamethoxazole-trimethoprim -160 MG Oral Tab per tablet Take 1 tablet by mouth 2 (two) times daily for 5 days. (Patient taking differently: Take 1 tablet by mouth 2 (two) times daily. 10 days 10/9-10/19) 10 tablet 0    buPROPion  MG Oral Tablet 24 Hr Take 2 tablets (300 mg total) by mouth daily. 60 tablet 0    carvedilol 25 MG Oral Tab 1 tablet (25 mg total) by Per G Tube route 2 (two) times daily with meals. 60 tablet 0    clotrimazole 1 % External Cream Apply 1 Application topically 2 (two) times daily. To genital area 3 g 0    lisinopril 20 MG Oral  Tab Take 1.5 tablets (30 mg total) by mouth daily.      acetaminophen 500 MG Oral Tab Take 2 tablets (1,000 mg total) by mouth every 6 (six) hours as needed for Pain.      docusate sodium 100 MG Oral Tab 1 tablet (100 mg total) 2 (two) times daily. Per G Tube      atorvastatin 40 MG Oral Tab 1 tablet (40 mg total) by Per G Tube route nightly.      ascorbic acid 500 MG Oral Tab 1 tablet (500 mg total) by Per G Tube route daily.      Nutritional Supplements (ARGINAID) Oral Powd Pack 1 packet by Per G Tube route 2 (two) times a day.      cholecalciferol 25 MCG (1000 UT) Oral Cap 2 capsules (2,000 Units total). Per G Tube      doxazosin 8 MG Oral Tab 1 tablet (8 mg total) by Per G Tube route Noon. Hold if systolic less than 100      hydrALAZINE 100 MG Oral Tab 1 tablet (100 mg total) by Per G Tube route 3 (three) times daily. Per G-tube. Hold if systolic <100      amLODIPine 10 MG Oral Tab 1 tablet (10 mg total) by Per G Tube route every evening. Hold SBP<110mmHg      rivaroxaban 20 MG Oral Tab 1 tablet (20 mg total) by Per G Tube route daily.      Nutritional Supplements (JEVITY 1.5 LISA) Oral Liquid 350 mL. 350ml/hr 4x daily

## 2024-10-17 NOTE — DISCHARGE INSTRUCTIONS
Your suprapubic tube was changed today - 10/17/2024  18fr 30ml balloon    Your stones were lasered and pieces extracted  Stone analysis is pending  A post operative stent was replaced with a string. You can remove the stent by pulling on the string on Saturday 10/19/24.  Please finish your antibiotics as directed (end date per med sheet on presentation to [facility] was 10/19/24)    You are welcome to have a UROLOGY follow up in the clinic as needed to review any other stone concerns  Please try to stay hydrated. You will be contacted with stone analysis results.  This can take a couple of weeks    Checo Moreno MD   [phone]    You received a drug called Toradol which is an Anti Inflammatory at: 10:30 am  If you are allowed to take Anti inflammatories:    Do not take any Anti Inflammatory like Motrin, Aleve or Ibuprophen until after: 4:30 pm  Please report any suspected allergic reactions or bleeding issues to your doctor

## 2024-10-17 NOTE — ANESTHESIA PREPROCEDURE EVALUATION
PRE-OP EVALUATION    Patient Name: Jose Ruvalcaba    Admit Diagnosis: kidney stones    Pre-op Diagnosis: kidney stones    CYSTOSCOPY URETEROSCOPY with holium laser lithotripsy, right retrograde pyelogram, right ureteral stent placement    Anesthesia Procedure: CYSTOSCOPY URETEROSCOPY with holium laser lithotripsy, right retrograde pyelogram, right ureteral stent placement (Right: Ureter)    Surgeons and Role:     * Checo Moreno MD - Primary    Pre-op vitals reviewed.  Temp: 97.9 °F (36.6 °C)  Pulse: 59  Resp: 16  BP: 137/66  SpO2: 98 %  Body mass index is 34.86 kg/m².    Current medications reviewed.  Hospital Medications:   [Transfer Hold] acetaminophen (Tylenol Extra Strength) tab 1,000 mg  1,000 mg Oral Once    [Transfer Hold] scopolamine (Transderm-Scop) 1 MG/3DAYS patch 1 patch  1 patch Transdermal Once    lactated ringers infusion   Intravenous Continuous    [COMPLETED] meropenem (Merrem) 500 mg in sodium chloride 0.9% 100 mL IVPB-MBP  500 mg Intravenous Once       Outpatient Medications:   Prescriptions Prior to Admission[1]    Allergies: Augmentin [amoxicillin-pot clavulanate] and Penicillins      Anesthesia Evaluation        Anesthetic Complications           GI/Hepatic/Renal      (+) GERD                           Cardiovascular  Comment: Stress test 2023 with \"large in size, moderate in severity, reversible defect involving the entire lateral wall and basal one-half of the inferior wall.\" Family states they did f/u with Cards and coronary disease was ruled out, however no cath in chart. Limited exercise capacity d/t CVA but denies CP, SOB    TTE 2/2023  1. Left ventricle: The cavity size was normal. Wall thickness was normal.      Systolic function was hyperdynamic. The estimated ejection fraction was      70-75%. Left ventricular diastolic function parameters were normal for      the patient's age.   2. Aortic root: The aortic root was mildly dilated and 3.8cm diameter.   3. Left atrium: The left atrial  volume was normal.   4. Pericardium, extracardiac: A small pericardial effusion is identified      circumferential to the heart. There was no evidence of hemodynamic      compromise.                   (+) hypertension                                     Endo/Other                                  Pulmonary    Negative pulmonary ROS.                       Neuro/Psych      (+) depression    (+) CVA (Hx hemorrahgic stroke with R hemiparesis) and residual deficits                           Past Surgical History:   Procedure Laterality Date    Cystoscopy,insert ureteral stent Right 07/2024    Cystourethroscopy  12/01/2022    bilateral retrograde pyelograms    Other surgical history      placement of Gtube     Social History     Socioeconomic History    Marital status:    Tobacco Use    Smoking status: Never    Smokeless tobacco: Never   Vaping Use    Vaping status: Former    Quit date: 1/7/2020   Substance and Sexual Activity    Alcohol use: Not Currently     Comment: Heavy drinker in past    Drug use: Not Currently     Types: Cannabis     History   Drug Use Unknown     Available pre-op labs reviewed.  Lab Results   Component Value Date    WBC 9.8 08/01/2024    RBC 3.61 (L) 08/01/2024    HGB 10.7 (L) 08/01/2024    HCT 32.8 (L) 08/01/2024    MCV 90.9 08/01/2024    MCH 29.6 08/01/2024    MCHC 32.6 08/01/2024    RDW 13.5 08/01/2024    .0 (H) 08/01/2024     Lab Results   Component Value Date     08/01/2024    K 4.0 08/01/2024     08/01/2024    CO2 27.0 08/01/2024    BUN 20 08/01/2024    CREATSERUM 0.97 08/01/2024    GLU 92 08/01/2024    CA 9.4 08/01/2024            Airway  Comment: R sided hemiparesis including face    Mallampati: III  Mouth opening: 3 FB  TM distance: 4 - 6 cm  Neck ROM: full Cardiovascular      Rhythm: regular  Rate: normal     Dental    Dentition appears grossly intact         Pulmonary      Breath sounds clear to auscultation bilaterally.               Other findings               ASA: 3   Plan: general  NPO status verified and patient meets guidelines.    Post-procedure pain management plan discussed with surgeon and patient.      Plan/risks discussed with: patient (Risks of general anesthesia discussed with patient, including nausea/vomiting, sore throat, dental injury, aspiration, allergic reaction, and cardiopulmonary compromise. All of their questions were answered and they are in agreement with the plan.)                Present on Admission:  **None**             [1]   Medications Prior to Admission   Medication Sig Dispense Refill Last Dose/Taking    cloNIDine 0.1 MG Oral Tab 1 tablet (0.1 mg total) by Per G Tube route 2 (two) times daily. Hold SBP <110   Taking    magnesium oxide 400 MG Oral Tab Take 1 tablet (400 mg total) by mouth daily.   Taking    methylphenidate 5 MG Oral Tab Take 1 tablet (5 mg total) by mouth daily.   Taking    potassium chloride 40 meq/30 ml (10%) Oral Solution 30 mL (40 mEq total) by Per G Tube route daily.   Taking    Ferrous Sulfate 220 (44 Fe) MG/5ML Oral Solution 7.5 mL by Per G Tube route 3 (three) times daily.   Taking    calcium carbonate 500 MG Oral Chew Tab Chew 1 tablet (500 mg total) by mouth 3 (three) times daily as needed.   Taking As Needed    [] sulfamethoxazole-trimethoprim -160 MG Oral Tab per tablet Take 1 tablet by mouth 2 (two) times daily for 5 days. (Patient taking differently: Take 1 tablet by mouth 2 (two) times daily. 10 days 10/9-10/19) 10 tablet 0 Taking Differently    buPROPion  MG Oral Tablet 24 Hr Take 2 tablets (300 mg total) by mouth daily. 60 tablet 0 Taking    carvedilol 25 MG Oral Tab 1 tablet (25 mg total) by Per G Tube route 2 (two) times daily with meals. 60 tablet 0 10/16/2024 Bedtime    clotrimazole 1 % External Cream Apply 1 Application topically 2 (two) times daily. To genital area 3 g 0 Taking    lisinopril 20 MG Oral Tab Take 1.5 tablets (30 mg total) by mouth daily.   Taking     acetaminophen 500 MG Oral Tab Take 2 tablets (1,000 mg total) by mouth every 6 (six) hours as needed for Pain.   Taking As Needed    docusate sodium 100 MG Oral Tab 1 tablet (100 mg total) 2 (two) times daily. Per G Tube   Taking    atorvastatin 40 MG Oral Tab 1 tablet (40 mg total) by Per G Tube route nightly.   Taking    ascorbic acid 500 MG Oral Tab 1 tablet (500 mg total) by Per G Tube route daily.   Taking    Nutritional Supplements (ARGINAID) Oral Powd Pack 1 packet by Per G Tube route 2 (two) times a day.   Taking    cholecalciferol 25 MCG (1000 UT) Oral Cap 2 capsules (2,000 Units total). Per G Tube   Taking    doxazosin 8 MG Oral Tab 1 tablet (8 mg total) by Per G Tube route Noon. Hold if systolic less than 100   Taking    hydrALAZINE 100 MG Oral Tab 1 tablet (100 mg total) by Per G Tube route 3 (three) times daily. Per G-tube. Hold if systolic <100   10/16/2024 Bedtime    amLODIPine 10 MG Oral Tab 1 tablet (10 mg total) by Per G Tube route every evening. Hold SBP<110mmHg   Taking    rivaroxaban 20 MG Oral Tab 1 tablet (20 mg total) by Per G Tube route daily.   10/14/2024    Nutritional Supplements (JEVITY 1.5 LISA) Oral Liquid 350 mL. 350ml/hr 4x daily

## 2024-10-17 NOTE — ANESTHESIA PROCEDURE NOTES
Airway  Date/Time: 10/17/2024 9:37 AM  Urgency: elective      General Information and Staff    Patient location during procedure: OR  Anesthesiologist: Prudencio Yancey MD  Performed: anesthesiologist   Performed by: Prudencio Yancey MD  Authorized by: Prudencio Yancey MD      Indications and Patient Condition  Indications for airway management: anesthesia  Sedation level: deep  Preoxygenated: yes  Patient position: sniffing  Mask difficulty assessment: 2 - vent by mask + OA or adjuvant +/- NMBA    Final Airway Details  Final airway type: endotracheal airway      Successful airway: ETT  Cuffed: yes   Successful intubation technique: Video laryngoscopy  Endotracheal tube insertion site: oral  Blade type: Onescope.  Blade size: #3  ETT size (mm): 7.5    Cormack-Lehane Classification: grade IIA - partial view of glottis  Placement verified by: capnometry   Measured from: lips  Number of attempts at approach: 1    Additional Comments  MAC3 with G3v. Switched to Onescope with G2aV, atraumatic intubation

## 2024-10-17 NOTE — ANESTHESIA POSTPROCEDURE EVALUATION
[facility]    Jose Ruvalcaba Patient Status:  Hospital Outpatient Surgery   Age/Gender 54 year old male MRN [id number]   Location [facility] POST ANESTHESIA CARE UNIT Attending Checo Moreno MD   Hosp Day # 0 PCP Rick Lawson MD       Anesthesia Post-op Note    CYSTOSCOPY URETEROSCOPY with holium laser lithotripsy, right retrograde pyelogram, right ureteral stent placement; Exchange or supraprubic catheter    Procedure Summary       Date: 10/17/24 Room / Location:  MAIN OR 07 / [facility] MAIN OR    Anesthesia Start: 0918 Anesthesia Stop: 1048    Procedure: CYSTOSCOPY URETEROSCOPY with holium laser lithotripsy, right retrograde pyelogram, right ureteral stent placement; Exchange or supraprubic catheter (Right: Ureter) Diagnosis: (kidney stones)    Surgeons: Checo Moreno MD Anesthesiologist: Prudencio Yancey MD    Anesthesia Type: general ASA Status: 3            Anesthesia Type: general    Vitals Value Taken Time   /76 10/17/24 1048   Temp 98.3 10/17/24 1048   Pulse 82 10/17/24 1048   Resp 13 10/17/24 1048   SpO2 93 10/17/24 1048       Patient Location: PACU    Anesthesia Type: general    Airway Patency: patent    Postop Pain Control: adequate    Mental Status: mildly sedated but able to meaningfully participate in the post-anesthesia evaluation    Nausea/Vomiting: none    Cardiopulmonary/Hydration status: stable euvolemic    Complications: no apparent anesthesia related complications    Postop vital signs: stable    Dental Exam: Unchanged from Preop    Patient to be discharged from PACU when criteria met.

## 2024-10-17 NOTE — OPERATIVE REPORT
[facility]   part of [facility]    Operative Note         Jose Ruvalcaba Location: OR   [facility] [id number] MRN [id number]   Admission Date 10/17/2024 Operation Date 10/17/2024   Attending Physician Checo Moreno MD       Patient Name: Jose Ruvalcaba     Preoperative Diagnosis: kidney stones     Postoperative Diagnosis: kidney stones     Procedure(s):  CYSTOSCOPY URETEROSCOPY with holium laser lithotripsy, right retrograde pyelogram, right ureteral stent replacement; Exchange of suprapubic catheter, intraoperative interpretation of fluoroscopic images      Primary Surgeon: Checo Moreno MD           Anesthesia: General     Specimen:   ID Type Source Tests Collected by Time Destination   1 : Right Kidney Stone Calculus Kidney stone (calculus) CALCULI, URINARY, SURGICAL PATHOLOGY TISSUE Checo Moreno MD 10/17/2024 1015         Estimated Blood Loss: <5ml  Complications: none      Indications for procedure: right ureter stent placed this summer for ureter stone and UTI. Here for stone management. Has chronic SPT for NGB due to CVA.  Desires surgical intervention.  Understands the risks of the procedure, including but not limited to the general anesthetic involved, urinary bleeding, infection, bladder injury, ureteral injury, renal injury, inability to extract or pass a large stone due to its large nature, the need for laser lithotripsy with subsequent passage of particles, the need for ureteral stent with subsequent removal, the risks for ureteral stricture disease, stone persistence, re-formation, and the unilateral nature of this procedure. Would like to proceed.     Surgical Findings: 18fr SPT exchanged at the end  Stent was grossly clean and exchanged with string stent     Complexity: NA (optional)    Operative Summary:        DRAINS: A 6-Bangladeshi x 26 cm double-J ureteral stent. With string  18Fr 30cc balloon for SPT         PROCEDURE IN DETAIL: The patient was brought to the operating room after being correctly  identified and surgical consent was obtained. All questions were answered. General anesthesia was induced without difficulty. Legs were placed in the dorsal lithotomy position, padded and secured, and the working area was prepped and draped in the usual sterile fashion. SPT area was prepped as well. Examination by  fluoroscopy identified the stent in good position. A 21-Moroccan scope was inserted per urethra. The urethra was normal. Prostate is normal.  Upon entry into the bladder, the urine was grossly clear. Panendoscopy was performed and showed no evidence of any bladder tumors, acute cystitis, or stones. The ureteral orifice on the right has a stent and edema over the orifice. The SPT balloon is at the dome. The right stent was grasped out.  Wire was fed alongside.  Semi rigid ureteroscope was passed along side the wire. There is some stone debris irrigated free. No large stone residual in the distal ureter. Mid ureter and proximal ureter are clean.  Wire was confirmed into the renal pelvis.  Right retrograde pyelogram was performed showing a normal caliber ureter and no hydronephrosis. 11/13 ureteral access sheath was passed. A flexible ureteroscope was then passed into the kidney. Stone was identified in the Calyx.  A 200 micron holmium laser fiber was fed through. At settings of 0.4 joules and eventually 20 Hz, the stone was fragmented into multiple smaller pieces. I was able to use the stone basket to retrieve out the majority of the stone fragments. There was some blood clot extracted as well.  On retrograde pyelogram, I did not see any residual dominant fragments. At the end of the procedure, retrograde pyelogram was reperformed showing no extravasation. Ureteroscope removed. With the Glidewire in good position, a 6-Moroccan x 26 cm double-J ureteral stent was passed. The proximal pigtail curled in the renal pelvis. The distal pigtail curled in the bladder. A string was left on the stent.  18fr  suprapubic tube was then exchanged. Balloon inflated with 30ml.  Confirmed good position back in the bladder.  String of the stent was left dangling out the penile meatus. The patient was returned to the supine position, extubated, and transferred to the recovery room in stable condition. I was present and performed the entire procedure as stated above. There were no complications.        Implants:   Implant Name Type Inv. Item Serial No.  Lot No. LRB No. Used Action   STENT URO [id number] PGTL SFT HYDRPHLC COAT - SNA  STENT URO [id number] PGTL SFT HYDRPHLC COAT NA [employer] WD [id number] Right 1 Implanted        Drains: SPT     Condition: stable       Checo Moreno MD

## 2024-10-26 LAB
CAOX MONOHYDRATE: 100 %
WEIGHT-STONE: 8 MG

## 2025-07-09 ENCOUNTER — HOSPITAL ENCOUNTER (EMERGENCY)
Facility: HOSPITAL | Age: 55
Discharge: HOME | End: 2025-07-09
Attending: EMERGENCY MEDICINE
Payer: MEDICARE

## 2025-07-09 ENCOUNTER — APPOINTMENT (OUTPATIENT)
Dept: CT IMAGING | Facility: HOSPITAL | Age: 55
End: 2025-07-09
Attending: EMERGENCY MEDICINE
Payer: MEDICARE

## 2025-07-09 VITALS
TEMPERATURE: 98 F | HEART RATE: 61 BPM | OXYGEN SATURATION: 99 % | SYSTOLIC BLOOD PRESSURE: 114 MMHG | DIASTOLIC BLOOD PRESSURE: 52 MMHG

## 2025-07-09 DIAGNOSIS — R10.9 ABDOMINAL PAIN OF UNKNOWN ETIOLOGY: ICD-10-CM

## 2025-07-09 DIAGNOSIS — N30.90 CYSTITIS: Primary | ICD-10-CM

## 2025-07-09 LAB
ALBUMIN SERPL-MCNC: 3.7 G/DL (ref 3.2–4.8)
ALBUMIN/GLOB SERPL: 1.3 {RATIO} (ref 1–2)
ALP LIVER SERPL-CCNC: 133 U/L (ref 45–117)
ALT SERPL-CCNC: 20 U/L (ref 10–49)
ANION GAP SERPL CALC-SCNC: 5 MMOL/L (ref 0–18)
AST SERPL-CCNC: 20 U/L (ref ?–34)
BASOPHILS # BLD AUTO: 0.03 X10(3) UL (ref 0–0.2)
BASOPHILS NFR BLD AUTO: 0.5 %
BILIRUB SERPL-MCNC: 0.2 MG/DL (ref 0.3–1.2)
BILIRUB UR QL STRIP.AUTO: NEGATIVE
BUN BLD-MCNC: 18 MG/DL (ref 9–23)
CALCIUM BLD-MCNC: 9 MG/DL (ref 8.7–10.6)
CHLORIDE SERPL-SCNC: 110 MMOL/L (ref 98–112)
CO2 SERPL-SCNC: 27 MMOL/L (ref 21–32)
COLOR UR AUTO: YELLOW
CREAT BLD-MCNC: 1.15 MG/DL (ref 0.7–1.3)
EGFRCR SERPLBLD CKD-EPI 2021: 74 ML/MIN/1.73M2 (ref 60–?)
EOSINOPHIL # BLD AUTO: 0.43 X10(3) UL (ref 0–0.7)
EOSINOPHIL NFR BLD AUTO: 6.9 %
ERYTHROCYTE [DISTWIDTH] IN BLOOD BY AUTOMATED COUNT: 13.4 %
GLOBULIN PLAS-MCNC: 2.8 G/DL (ref 2–3.5)
GLUCOSE BLD-MCNC: 108 MG/DL (ref 70–99)
GLUCOSE UR STRIP.AUTO-MCNC: NORMAL MG/DL
HCT VFR BLD AUTO: 33.3 % (ref 39–53)
HGB BLD-MCNC: 11.2 G/DL (ref 13–17.5)
HYALINE CASTS #/AREA URNS AUTO: PRESENT /LPF
IMM GRANULOCYTES # BLD AUTO: 0.01 X10(3) UL (ref 0–1)
IMM GRANULOCYTES NFR BLD: 0.2 %
KETONES UR STRIP.AUTO-MCNC: NEGATIVE MG/DL
LEUKOCYTE ESTERASE UR QL STRIP.AUTO: 500
LIPASE SERPL-CCNC: 20 U/L (ref 12–53)
LYMPHOCYTES # BLD AUTO: 1.42 X10(3) UL (ref 1–4)
LYMPHOCYTES NFR BLD AUTO: 22.9 %
MCH RBC QN AUTO: 30.2 PG (ref 26–34)
MCHC RBC AUTO-ENTMCNC: 33.6 G/DL (ref 31–37)
MCV RBC AUTO: 89.8 FL (ref 80–100)
MONOCYTES # BLD AUTO: 0.67 X10(3) UL (ref 0.1–1)
MONOCYTES NFR BLD AUTO: 10.8 %
NEUTROPHILS # BLD AUTO: 3.65 X10 (3) UL (ref 1.5–7.7)
NEUTROPHILS # BLD AUTO: 3.65 X10(3) UL (ref 1.5–7.7)
NEUTROPHILS NFR BLD AUTO: 58.7 %
NITRITE UR QL STRIP.AUTO: NEGATIVE
OSMOLALITY SERPL CALC.SUM OF ELEC: 296 MOSM/KG (ref 275–295)
PH UR STRIP.AUTO: 6.5 [PH] (ref 5–8)
PLATELET # BLD AUTO: 304 10(3)UL (ref 150–450)
POTASSIUM SERPL-SCNC: 3.8 MMOL/L (ref 3.5–5.1)
PROT SERPL-MCNC: 6.5 G/DL (ref 5.7–8.2)
PROT UR STRIP.AUTO-MCNC: 30 MG/DL
RBC # BLD AUTO: 3.71 X10(6)UL (ref 4.3–5.7)
RBC #/AREA URNS AUTO: >10 /HPF
SODIUM SERPL-SCNC: 142 MMOL/L (ref 136–145)
SP GR UR STRIP.AUTO: >1.03 (ref 1–1.03)
UROBILINOGEN UR STRIP.AUTO-MCNC: NORMAL MG/DL
WBC # BLD AUTO: 6.2 X10(3) UL (ref 4–11)
WBC #/AREA URNS AUTO: >50 /HPF
WBC CLUMPS UR QL AUTO: PRESENT /HPF
YEAST UR QL: PRESENT /HPF

## 2025-07-09 PROCEDURE — 87184 SC STD DISK METHOD PER PLATE: CPT | Performed by: EMERGENCY MEDICINE

## 2025-07-09 PROCEDURE — 74177 CT ABD & PELVIS W/CONTRAST: CPT | Performed by: EMERGENCY MEDICINE

## 2025-07-09 PROCEDURE — 99284 EMERGENCY DEPT VISIT MOD MDM: CPT

## 2025-07-09 PROCEDURE — 87186 SC STD MICRODIL/AGAR DIL: CPT | Performed by: EMERGENCY MEDICINE

## 2025-07-09 PROCEDURE — 83690 ASSAY OF LIPASE: CPT | Performed by: EMERGENCY MEDICINE

## 2025-07-09 PROCEDURE — 87077 CULTURE AEROBIC IDENTIFY: CPT | Performed by: EMERGENCY MEDICINE

## 2025-07-09 PROCEDURE — 36415 COLL VENOUS BLD VENIPUNCTURE: CPT

## 2025-07-09 PROCEDURE — 99285 EMERGENCY DEPT VISIT HI MDM: CPT

## 2025-07-09 PROCEDURE — 87086 URINE CULTURE/COLONY COUNT: CPT | Performed by: EMERGENCY MEDICINE

## 2025-07-09 PROCEDURE — 80053 COMPREHEN METABOLIC PANEL: CPT | Performed by: EMERGENCY MEDICINE

## 2025-07-09 PROCEDURE — 81001 URINALYSIS AUTO W/SCOPE: CPT | Performed by: EMERGENCY MEDICINE

## 2025-07-09 PROCEDURE — 85025 COMPLETE CBC W/AUTO DIFF WBC: CPT | Performed by: EMERGENCY MEDICINE

## 2025-07-09 RX ORDER — CEPHALEXIN 250 MG/5ML
500 POWDER, FOR SUSPENSION ORAL ONCE
Status: COMPLETED | OUTPATIENT
Start: 2025-07-09 | End: 2025-07-09

## 2025-07-09 RX ORDER — CEPHALEXIN 250 MG/5ML
500 POWDER, FOR SUSPENSION ORAL 3 TIMES DAILY
Qty: 210 ML | Refills: 0 | Status: SHIPPED | OUTPATIENT
Start: 2025-07-09 | End: 2025-07-12

## 2025-07-09 NOTE — ED PROVIDER NOTES
Patient Seen in: [facility]        History  Chief Complaint   Patient presents with    GI Bleeding     Stated Complaint: + ultrasound bowel obs per nursing home    Subjective:   HPI            Patient is a 55-year-old male presents to ED for evaluation of abdominal pain.  Patient with history of stroke with right-sided hemiplegia.  States his abdominal pain started yesterday.  ED nurses notes states he had 1 view abdominal x-ray in the nursing home with findings consistent with ileus or partial obstruction.  Patient denies any fever or vomiting.  He has a G-tube and a suprapubic Kirkland.  Patient has had no abdominal surgeries      Objective:     No pertinent past medical history.            No pertinent past surgical history.              No pertinent social history.                              Physical Exam    ED Triage Vitals [07/09/25 0107]   /52   Pulse 62   Resp    Temp 97.8 °F (36.6 °C)   Temp src Oral   SpO2 95 %   O2 Device None (Room air)       Current Vitals:   Vital Signs  BP: 114/52  Pulse: 61  Temp: 97.8 °F (36.6 °C)  Temp src: Oral  MAP (mmHg): 69    Oxygen Therapy  SpO2: 99 %  O2 Device: None (Room air)            Physical Exam  Constitutional: Patient is in no acute distress  HEENT: Sclerae white, conjunctiva pink.  Old right-sided facial droop  Lungs: Clear  Cardiovascular: Regular rate and rhythm, S1-S2  Abdominal: Soft, mild diffuse tenderness.  Hyperactive bowel sounds.  G-tube site clean, dry, intact.  Patient has Kirkland catheter suprapubic  Neurological: Patient has right-sided hemiplegia.  He is able to move the left side        ED Course  Labs Reviewed   CBC WITH DIFFERENTIAL WITH PLATELET - Abnormal; Notable for the following components:       Result Value    RBC 3.71 (*)     HGB 11.2 (*)     HCT 33.3 (*)     All other components within normal limits   COMP METABOLIC PANEL (14) - Abnormal; Notable for the following components:    Glucose 108 (*)      Calculated Osmolality 296 (*)     Alkaline Phosphatase 133 (*)     Bilirubin, Total 0.2 (*)     All other components within normal limits   LIPASE - Normal   URINALYSIS WITH CULTURE REFLEX   RAINBOW DRAW LAVENDER   RAINBOW DRAW LIGHT GREEN   RAINBOW DRAW BLUE   Hemoglobin 11.2.  Glucose 108       I personally reviewd CT images of abdomen and pelvis and independent interpretation shows no obvious bowel obstruction.  I also viewed formal radiology report as read by radiology with findings below:    CT of abdomen and pelvis read by vision rad radiology shows -Mild fat stranding around the bladder is suspicious for cystitis.  Suprapubic catheter within decompressed bladder.    -No evidence of SBO.  -Normal appendix.  -Moderate amount of stool throughout the large bowel.  -G-tube in the stomach.  -Unremarkable liver, gallbladder, spleen, pancreas, adrenal glands.  -No evidence of an obstructive uropathy.  Nonobstructing bilateral renal calyceal stones.  -No aortic aneurysm.  -No ascites or pneumoperitoneum.                    MDM     Patient is a 55-year-old male presents to ED for evaluation of abdominal pain.  Differential bowel obstruction, urinary tract infection, appendicitis, kidney stone.  Patient underwent laboratory testing which was unremarkable except for hemoglobin 11.2.  CT abdomen pelvis obtained showing mild fat stranding around the bladder suspicious for cystitis.  No evidence for bowel obstruction.  Mild amount of stool throughout the colon.  Normal appendix.  CT abdomen pelvis negative for acute surgical process.  Will send urinalysis and culture and treat for potential cystitis as a cause of his pain.  Patient states his suprapubic catheter was just changed out today.  Do not believe suprapubic catheter needs to be exchanged here.  He will be sent back to the nursing home on Keflex.    Patient was screened and evaluated during this visit.   As a treating physician attending to the patient, I  determined, within reasonable clinical confidence and prior to discharge, that an emergency medical condition was not or was no longer present.  There was no indication for further evaluation, treatment or admission on an emergency basis.  Comprehensive verbal and written discharge and follow-up instructions were provided to help prevent relapse or worsening.  Patient was instructed to follow-up with her primary care provider for further evaluation and treatment, but to return immediately to the ER for worsening, concerning, new, changing or persisting symptoms.  I discussed the case with the patient and they had no questions, complaints, or concerns.  Patient felt comfortable going home.            Medical Decision Making      Disposition and Plan     Clinical Impression:  1. Cystitis    2. Abdominal pain of unknown etiology         Disposition:  There is no disposition on file for this visit.  There is no disposition time on file for this visit.    Follow-up:  Rick Lawson MD  [address] [place] [address] [place] [address] [phone]    Follow up  As needed          Medications Prescribed:  Current Discharge Medication List        START taking these medications    Details   cephALEXin 250 MG/5ML Oral Recon Susp 10 mL (500 mg total) by Per G Tube route 3 (three) times daily for 7 days.  Qty: 210 mL, Refills: 0                   Supplementary Documentation:

## 2025-07-09 NOTE — ED QUICK NOTES
Report given to RN at [facility] regarding patients care and treatment, [facility] called and eta 20 mins

## 2025-07-09 NOTE — ED INITIAL ASSESSMENT (HPI)
Patient got ultrasound at nursing home, findings of ileus vs partial obstruction. Patient has no pain, nausea, or vomiting. Bowel sounds active. He has blood tinged urine from castillo.

## 2025-07-09 NOTE — DISCHARGE INSTRUCTIONS
Begin Keflex 3 times daily through G-tube    CT abdomen pelvis negative for any obstructive process   [Follow-Up: _____] : a [unfilled] follow-up visit [FreeTextEntry1] : MS

## (undated) DEVICE — Device

## (undated) DEVICE — SOLUTION  .9 3000ML

## (undated) DEVICE — TIGERTAIL 5F FLXTIP 70CM

## (undated) DEVICE — NITINOL STONE RETRIEVAL BASKET: Brand: ZERO TIP

## (undated) DEVICE — GAMMEX® PI HYBRID SIZE 7.5, STERILE POWDER-FREE SURGICAL GLOVE, POLYISOPRENE AND NEOPRENE BLEND: Brand: GAMMEX

## (undated) DEVICE — SOLUTION IRRIG 3000ML 0.9% NACL FLX CONT

## (undated) DEVICE — SUPRAPUBIC PROCENDURAL TRAY

## (undated) DEVICE — NITINOL WIRE WITH HYDROPHILIC TIP: Brand: SENSOR

## (undated) DEVICE — SLEEVE COMPR MD KNEE LEN SGL USE KENDALL SCD

## (undated) DEVICE — URETERAL ACCESS SHEATH SET: Brand: NAVIGATOR HD

## (undated) DEVICE — SLEEVE KENDALL SCD EXPRESS MED

## (undated) DEVICE — GLOVE SUR 6.5 SENSICARE PI PIP CRM PWD F

## (undated) DEVICE — SPONGE STICK WITH PVP-I: Brand: KENDALL

## (undated) DEVICE — GLOVE SUR 8 SENSICARE NEOPR PWD F

## (undated) DEVICE — GLOVE SUR 7 SENSICARE PI PIP CRM PWD F

## (undated) DEVICE — TRAY FOLEY 16F IC URINMETER

## (undated) DEVICE — WATER STERILE AQUALITE 1000ML

## (undated) DEVICE — PACK PBDS CYSTOSCOPY

## (undated) DEVICE — STERILE POLYISOPRENE POWDER-FREE SURGICAL GLOVES: Brand: PROTEXIS

## (undated) DEVICE — SYRINGE MED 20ML STD CLR PLAS LL TIP N CTRL

## (undated) DEVICE — FIBER LSR 200UM 2J 80HZ 60W DL FOR LITHO

## (undated) DEVICE — SUT SILK 2-0 SH K833H

## (undated) DEVICE — CYSTO CDS-LF: Brand: MEDLINE INDUSTRIES, INC.

## (undated) DEVICE — 3M™ TEGADERM™ TRANSPARENT FILM DRESSING FRAME STYLE, 1624W, 2-3/8 IN X 2-3/4 IN (6 CM X 7 CM), 100/CT 4CT/CASE: Brand: 3M™ TEGADERM™

## (undated) DEVICE — SOLUTION IRRIG 1000ML ST H2O AQUALITE PLAS

## (undated) DEVICE — CATHETER URET 5FR L70CM FLX OPN TIP NONPORTED

## (undated) DEVICE — MEGADYNE ELECTRODE ADULT PT RT

## (undated) DEVICE — MEDI-VAC NON-CONDUCTIVE SUCTION TUBING: Brand: CARDINAL HEALTH

## (undated) DEVICE — NEEDLE SPINAL 22X5 405148

## (undated) DEVICE — CYSTO PACK: Brand: MEDLINE INDUSTRIES, INC.

## (undated) DEVICE — GLOVE SUR 7.5 SENSICARE PI PIP CRM PWD F

## (undated) DEVICE — ZIPWIRE GUIDEWIRE 035X150 STR

## (undated) DEVICE — 20 ML SYRINGE LUER-LOCK TIP: Brand: MONOJECT

## (undated) DEVICE — STERILE WATER 1000ML BTL

## (undated) DEVICE — UROLOGY DRAIN BAG

## (undated) DEVICE — SYRINGE MED 10ML LL TIP W/O SFTY DISP

## (undated) DEVICE — 3M™ STERI-STRIP™ REINFORCED ADHESIVE SKIN CLOSURES, R1547, 1/2 IN X 4 IN (12 MM X 100 MM), 6 STRIPS/ENVELOPE: Brand: 3M™ STERI-STRIP™

## (undated) NOTE — LETTER
Patient Name: Jose Ruvalcaba  Surgery Date: 10/17/2024  Medical Record: [id number] CSN: [id number]      Surgeon(s):  Checo Moreno MD  Consent Procedure: CYSTOSCOPY URETEROSCOPY with holium laser lithotripsy, right retrograde pyelogram, right ureteral stent placement  Anesthesia Type: General    FOR YOUR SAFETY  FOOD AND BEVERAGE INTAKE BEFORE SURGERY    The day before your surgery:  DO:  Do drink 12 ounces of Gatorade (not red colored) 12 hours before your scheduled surgery time    On the day of your surgery:  DO:  Do drink 12 ounces of Gatorade (not red colored) - must be completed 4 hours prior to your scheduled surgery time  Along with your Gatorade, take acetaminophen 1000 mg (2 Extra Strength Tylenol tablets) by mouth, unless instructed otherwise by your surgeon or [facility] Staff  DO NOT:  Do not eat any solid foods after 11:00p.m. the night before your surgery  Do not drink any other liquids (including water) before your surgery - besides the 12 ounces of Gatorade  Do not chew gum or eat any candies before your surgery    Why does your anesthesiologist require you to drink Gatorade before surgery?  To increase your comfort before surgery  To decrease your nausea after surgery  The carbohydrates in Gatorade help reduce your body’s stress response to surgery    Why does your anesthesiologist ask you to take acetaminophen before surgery?  The acetaminophen will help you reduce your pain after surgery  Acetaminophen helps reduce the amount of other medications, such as narcotics, you need to help manage your pain    Medication Instructions:    To ensure a safe procedure/surgery there are certain medications that may need to be stopped prior to your procedure.    Please call your surgeon and prescribing physician for instructions on stopping the following blood thinning medications prior to your procedure/surgery.  Your physician will provide you with instructions on whether or not you will need to stop and  when to stop these medications.  Coumadin  Xarelto  Pradaxa, Ticlid  Aspirin  Ibuprofen, Motrin, Aleve  Meloxicam, Naproxen, Etodolac, Diclofenac  * Any blood thinning medication    If you are taking any herbal supplements you need to stop them at least 14 days before your procedure or at the time of our call whichever comes first.    If you are taking any weight loss products (anorexiants), such as Phentermine, you will need to stop taking them at least 10 days prior to your procedure/surgery or at the time of the screening phone call whichever comes first.    If you are taking a GLP-1 Agonist medication for weight loss such as Liraglutide (Saxenda), Semaglutide (Ozempic),  Dulaglutide (Trulicity),  Exenatide (Byetta), Liraglutide (Victoza), Tirzepatide (Mounjaro) daily hold the medication the day of your surgery/procedure, it you are taking this medication weekly, hold it one week prior to your surgery/procedure.   If you are taking a SGLT2 inhibitor medication such as Canagliflozin (Invokana), Dapagliflozin (Farxiga), Empagliflozin (Jardiance), or Ertugliflozin (Steglatro) you will need to hold these medications for 4 days prior to the day of your surgery/procedure or as instructed during the screening phone call.  If you are unsure if you are taking any of the above types of medications, please check with your provider.

## (undated) NOTE — LETTER
[address]  [address]  Authorization for Surgical Operation and Procedure     Date:___________                                                                                                         Time:__________  I hereby authorize Surgeon(s):  Andrae Nation MD, my physician and his/her assistants (if applicable), which may include medical students, residents, and/or fellows, to perform the following surgical operation/ procedure and administer such anesthesia as may be determined necessary by my physician:  Operation/Procedure name (s) Procedure(s):  CYSTOSCOPY. RETROGRADE, PYELOGRAM,  INSERTION URETERAL STENT- RIGHT on Jose [place]   2.   I recognize that during the surgical operation/procedure, unforeseen conditions may necessitate additional or different procedures than those listed above.  I, therefore, further authorize and request that the above-named surgeon, assistants, or designees perform such procedures as are, in their judgment, necessary and desirable.    3.   My surgeon/physician has discussed prior to my surgery the potential benefits, risks and side effects of this procedure; the likelihood of achieving goals; and potential problems that might occur during recuperation.  They also discussed reasonable alternatives to the procedure, including risks, benefits, and side effects related to the alternatives and risks related to not receiving this procedure.  I have had all my questions answered and I acknowledge that no guarantee has been made as to the result that may be obtained.    4.   Should the need arise during my operation/procedure, which includes change of level of care prior to discharge, I also consent to the administration of blood and/or blood products.  Further, I understand that despite careful testing and screening of blood or blood products by collecting agencies, I may still be subject to ill effects as a result of receiving a  blood transfusion and/or blood products.  The following are some, but not all, of the potential risks that can occur: fever and allergic reactions, hemolytic reactions, transmission of diseases such as Hepatitis, AIDS and Cytomegalovirus (CMV) and fluid overload.  In the event that I wish to have an autologous transfusion of my own blood, or a directed donor transfusion, I will discuss this with my physician.  Check only if Refusing Blood or Blood Products  I understand refusal of blood or blood products as deemed necessary by my physician may have serious consequences to my condition to include possible death. I hereby assume responsibility for my refusal and release the hospital, its personnel, and my physicians from any responsibility for the consequences of my refusal.          o  Refuse      5.   I authorize the use of any specimen, organs, tissues, body parts or foreign objects that may be removed from my body during the operation/procedure for diagnosis, research or teaching purposes and their subsequent disposal by hospital authorities.  I also authorize the release of specimen test results and/or written reports to my treating physician on the hospital medical staff or other referring or consulting physicians involved in my care, at the discretion of the Pathologist or my treating physician.    6.   I consent to the photographing or videotaping of the operations or procedures to be performed, including appropriate portions of my body for medical, scientific, or educational purposes, provided my identity is not revealed by the pictures or by descriptive texts accompanying them.  If the procedure has been photographed/videotaped, the surgeon will obtain the original picture, image, videotape or CD.  The hospital will not be responsible for storage, release or maintenance of the picture, image, tape or CD.    7.   I consent to the presence of a  or observers in the operating room as deemed  necessary by my physician or their designees.    8.   I recognize that in the event my procedure results in extended X-Ray/fluoroscopy time, I may develop a skin reaction.    9. If I have a Do Not Attempt Resuscitation (DNAR) order in place, that status will be suspended while in the operating room, procedural suite, and during the recovery period unless otherwise explicitly stated by me (or a person authorized to consent on my behalf). The surgeon or my attending physician will determine when the applicable recovery period ends for purposes of reinstating the DNAR order.  10. Patients having a sterilization procedure: I understand that if the procedure is successful the results will be permanent and it will therefore be impossible for me to inseminate, conceive, or bear children.  I also understand that the procedure is intended to result in sterility, although the result has not been guaranteed.   11. I acknowledge that my physician has explained sedation/analgesia administration to me including the risk and benefits I consent to the administration of sedation/analgesia as may be necessary or desirable in the judgment of my physician.    I CERTIFY THAT I HAVE READ AND FULLY UNDERSTAND THE ABOVE CONSENT TO OPERATION and/or OTHER PROCEDURE.    _________________________________________  __________________________________  Signature of Patient     Signature of Responsible Person         ___________________________________         Printed Name of Responsible Person           _________________________________                 Relationship to Patient  _________________________________________  ______________________________  Signature of Witness          Date  Time      Patient Name: Jose POLO Jordon     : 1970                 Printed: 2024     Medical Record #: [id number]                     Page 1 of 2                                    [address]   [address]    Consent for Anesthesia    IJose agree to be cared for by an anesthesiologist, who is specially trained to monitor me and give me medicine to put me to sleep or keep me comfortable during my procedure    I understand that my anesthesiologist is not an employee or agent of [facility] or [employer]. He or she works for [employer].    As the patient asking for anesthesia services, I agree to:  Allow the anesthesiologist (anesthesia doctor) to give me medicine and do additional procedures as necessary. Some examples are: Starting or using an “IV” to give me medicine, fluids or blood during my procedure, and having a breathing tube placed to help me breathe when I’m asleep (intubation). In the event that my heart stops working properly, I understand that my anesthesiologist will make every effort to sustain my life, unless otherwise directed by [facility] Do Not Resuscitate documents.  Tell my anesthesia doctor before my procedure:  If I am pregnant.  The last time that I ate or drank.  All of the medicines I take (including prescriptions, herbal supplements, and pills I can buy without a prescription (including street drugs/illegal medications). Failure to inform my anesthesiologist about these medicines may increase my risk of anesthetic complications.  If I am allergic to anything or have had a reaction to anesthesia before.  I understand how the anesthesia medicine will help me (benefits).  I understand that with any type of anesthesia medicine there are risks:  The most common risks are: nausea, vomiting, sore throat, muscle soreness, damage to my eyes, mouth, or teeth (from breathing tube placement).  Rare risks include: remembering what happened during my procedure, allergic reactions to medications, injury to my airway, heart, lungs, vision, nerves, or muscles and in extremely rare instances death.  My doctor has explained to me other choices available  to me for my care (alternatives).  Pregnant Patients (“epidural”):  I understand that the risks of having an epidural (medicine given into my back to help control pain during labor), include itching, low blood pressure, difficulty urinating, headache or slowing of the baby’s heart. Very rare risks include infection, bleeding, seizure, irregular heart rhythms and nerve injury.  Regional Anesthesia (“spinal”, “epidural”, & “nerve blocks”):  I understand that rare but potential complications include headache, bleeding, infection, seizure, irregular heart rhythms, and nerve injury.    I can change my mind about having anesthesia services at any time before I get the medicine.    _____________________________________________________________________________  Patient (or Representative) Signature/Relationship to Patient  Date   Time    _____________________________________________________________________________   Name (if used)    Language/Organization   Time    _____________________________________________________________________________  Anesthesiologist Signature     Date   Time  I have discussed the procedure and information above with the patient (or patient’s representative) and answered their questions. The patient or their representative has agreed to have anesthesia services.    _____________________________________________________________________________  Witness        Date   Time  I have verified that the signature is that of the patient or patient’s representative, and that it was signed before the procedure  Patient Name: Jose Ruvalcaba     : 1970                 Printed: 2024     Medical Record #: [id number]                     Page 2 of 2

## (undated) NOTE — IP AVS SNAPSHOT
[address]            (For Outpatient Use Only) Initial Admit Date: 10/25/2022   Inpt/Obs Admit Date: Inpt: 10/25/22 / Obs: N/A   Discharge Date:    Priyanka Route:  [de-identified]   MRN: [de-identified]   CSN: [id number]   CEID: [id number]        ENCOUNTER  Patient Class: Inpatient Admitting Provider: Inez Lamb MD Unit: [address] Service: Cardiac Telemetry Attending Provider: Salty Peraza MD   Bed: 2519-G   Visit Type:   Referring Physician: No ref. provider found Billing Flag:    Admit Diagnosis: Hypercalcemia [E83.52]      PATIENT  Legal Name:   Debbie Vizcarra   Legal Sex: Male  Gender ID: Male             [address] [facility],[address] Floor Name:    PCP:  Fozia Campuzano MD Home: [phone]   Address:  [facility]  : 1970 (45 yrs) Mobile: [phone]         City/State/Zip: [address] [place] [address] Marital:  Language: [place] park: Kandice SSN4: [id number] Amish: [address] Not [place] Child*     Race: Black Or  Ethnicity: Non  Or [address] [place] [address]   Name Relationship Legal Guardian? Home Phone Work Phone Mobile Phone   1. JordonGerardo  2.  Mahogany Yo  Friend    [phone]     GUARANTOR  Guarantor: Kumar Valdes : 1970 Home Phone: [phone]   Address: [facility]   Sex: Male Work Phone:    City/State/Zip: [address] [place] [address]   Rel. to Patient: Self Guarantor ID: [id number]   GUARANTOR EMPLOYER   Employer:  Status: NOT EMPLO*     COVERAGE  PRIMARY INSURANCE   Payor: Chula Ayoub Plan: [facility] 68   Group Number:  Insurance Type: [id number] Name: Yvette Moreno : 1970   Subscriber ID: [id number] Pt Rel to Subscriber: Self   SECONDARY INSURANCE   Payor:  Plan:    Group Number:  Insurance Type:    Subscriber Name:  Subscriber :    Subscriber ID:  Pt Rel to Subscriber:    TERTIARY INSURANCE   Payor:  Plan:    Group Number: Insurance Type:    Subscriber Name:  Subscriber :    Subscriber ID:  Pt Rel to Subscriber:    Hospital Account Financial Class: Medicaid Advantage    2022

## (undated) NOTE — IP AVS SNAPSHOT
[address]            (For Outpatient Use Only) Initial Admit Date: 2022   Inpt/Obs Admit Date: Inpt: 22 / Obs: N/A   Discharge Date:    Steve Hwang:  [de-identified]   MRN: [de-identified]   CSN: [id number]   CEID: [id number]        ENCOUNTER  Patient Class: Inpatient Admitting Provider: Noemy Kincaid MD Unit: [address] [facility] Service: Cardiac Telemetry Attending Provider: Noemy Kincaid MD   Bed: 7911-F   Visit Type:   Referring Physician: No ref. provider found Billing Flag:    Admit Diagnosis: Uncontrolled hypertension [I10]      PATIENT  Legal Name:   Roberto Estrella   Legal Sex: Male  Gender ID: Male             [address] [facility] [address] Name:    PCP:  Simon Riggs MD Home: [phone]   Address:  [address] [place] [address]  : 1970 (45 yrs) Mobile: [phone]         City/State/Zip: [address] [place] [address], [address] [place] [address] Marital:  Language: Jenn Jaycob: Kandice SSN4: [id number] Confucianism: [address] Not Francisco Ort*     Race: Black Or  Ethnicity: Non  Or [address] [place] [address]   Name Relationship Legal Guardian? Home Phone Work Phone Mobile Phone   1. Gerardo Ruvalcaba  2.  Keena Garnica  Friend    [phone]     GUARANTOR  Guarantor: Flavio Haines : 1970 Home Phone: [phone]   Address: [address] [place] [address]   Sex: Male Work Phone:    City/State/Zip: [address] [place] [address]   Rel. to Patient: Self Guarantor ID: [id number]   GUARANTOR EMPLOYER   Employer:  Status: NOT EMPLO*     COVERAGE  PRIMARY INSURANCE   Payor: [employer] MEDICAID Plan: [employer] [facility]*   Group Number: [id number] Insurance Type: [address] Name: Chris Gómez : 1970   Subscriber ID: [id number] Pt Rel to Subscriber: Self   SECONDARY INSURANCE   Payor:  Plan:    Group Number:  Insurance Type:    Subscriber Name:  Subscriber :    Subscriber ID:  Pt Rel to Subscriber:    TERTIARY INSURANCE Payor:  Plan:    Group Number:  Insurance Type:    Subscriber Name:  Subscriber :    Subscriber ID:  Pt Rel to Subscriber:    Hospital Account Financial Class: Medicaid Advantage    December 3, 2022

## (undated) NOTE — LETTER
[facility] Pre-Admission Testing Department  Phone: [phone]  Right Fax: [phone]    Patient Name: Jose Ruvalcaba  : 1970 - A: 54 y    Sex: male  Medical Record: [id number]  CSN: [id number]    Date of Surgery: 10/17/2024  Surgeon: Checo Moreno MD  Surgery/Procedure: CYSTOSCOPY URETEROSCOPY with holium laser lithotripsy, right retrograde pyelogram, right ureteral stent placement  Requested Documents:  Please fax the following information to Pre-Admission Testing [phone]:  Face sheet with current diagnosis  Medication list  Copy of any advanced directives (POA/Legal guardian, Living Will, DNR, etc.)  Is the patient able to provide their own medical history information? If not, who should we contact (POA, Legal guardian, etc.)?  : _____________________  Phone Number: _____________________

## (undated) NOTE — LETTER
[address]  [address]  Authorization for Surgical Operation and Procedure     Date:___________                                                                                                         Time:__________  I hereby authorize Surgeon(s):  Andrae Nation MD, my physician and his/her assistants (if applicable), which may include medical students, residents, and/or fellows, to perform the following surgical operation/ procedure and administer such anesthesia as may be determined necessary by my physician:  Operation/Procedure name (s) Procedure(s):  CYSTOSCOPY. RETROGRADE, PYELOGRAM,  INSERTION URETERAL STENT- RIGHT on Jose [place]   2.   I recognize that during the surgical operation/procedure, unforeseen conditions may necessitate additional or different procedures than those listed above.  I, therefore, further authorize and request that the above-named surgeon, assistants, or designees perform such procedures as are, in their judgment, necessary and desirable.    3.   My surgeon/physician has discussed prior to my surgery the potential benefits, risks and side effects of this procedure; the likelihood of achieving goals; and potential problems that might occur during recuperation.  They also discussed reasonable alternatives to the procedure, including risks, benefits, and side effects related to the alternatives and risks related to not receiving this procedure.  I have had all my questions answered and I acknowledge that no guarantee has been made as to the result that may be obtained.    4.   Should the need arise during my operation/procedure, which includes change of level of care prior to discharge, I also consent to the administration of blood and/or blood products.  Further, I understand that despite careful testing and screening of blood or blood products by collecting agencies, I may still be subject to ill effects as a result of receiving a  blood transfusion and/or blood products.  The following are some, but not all, of the potential risks that can occur: fever and allergic reactions, hemolytic reactions, transmission of diseases such as Hepatitis, AIDS and Cytomegalovirus (CMV) and fluid overload.  In the event that I wish to have an autologous transfusion of my own blood, or a directed donor transfusion, I will discuss this with my physician.  Check only if Refusing Blood or Blood Products  I understand refusal of blood or blood products as deemed necessary by my physician may have serious consequences to my condition to include possible death. I hereby assume responsibility for my refusal and release the hospital, its personnel, and my physicians from any responsibility for the consequences of my refusal.          o  Refuse      5.   I authorize the use of any specimen, organs, tissues, body parts or foreign objects that may be removed from my body during the operation/procedure for diagnosis, research or teaching purposes and their subsequent disposal by hospital authorities.  I also authorize the release of specimen test results and/or written reports to my treating physician on the hospital medical staff or other referring or consulting physicians involved in my care, at the discretion of the Pathologist or my treating physician.    6.   I consent to the photographing or videotaping of the operations or procedures to be performed, including appropriate portions of my body for medical, scientific, or educational purposes, provided my identity is not revealed by the pictures or by descriptive texts accompanying them.  If the procedure has been photographed/videotaped, the surgeon will obtain the original picture, image, videotape or CD.  The hospital will not be responsible for storage, release or maintenance of the picture, image, tape or CD.    7.   I consent to the presence of a  or observers in the operating room as deemed  necessary by my physician or their designees.    8.   I recognize that in the event my procedure results in extended X-Ray/fluoroscopy time, I may develop a skin reaction.    9. If I have a Do Not Attempt Resuscitation (DNAR) order in place, that status will be suspended while in the operating room, procedural suite, and during the recovery period unless otherwise explicitly stated by me (or a person authorized to consent on my behalf). The surgeon or my attending physician will determine when the applicable recovery period ends for purposes of reinstating the DNAR order.  10. Patients having a sterilization procedure: I understand that if the procedure is successful the results will be permanent and it will therefore be impossible for me to inseminate, conceive, or bear children.  I also understand that the procedure is intended to result in sterility, although the result has not been guaranteed.   11. I acknowledge that my physician has explained sedation/analgesia administration to me including the risk and benefits I consent to the administration of sedation/analgesia as may be necessary or desirable in the judgment of my physician.    I CERTIFY THAT I HAVE READ AND FULLY UNDERSTAND THE ABOVE CONSENT TO OPERATION and/or OTHER PROCEDURE.    _________________________________________  __________________________________  Signature of Patient     Signature of Responsible Person         ___________________________________         Printed Name of Responsible Person           _________________________________                 Relationship to Patient  _________________________________________  ______________________________  Signature of Witness          Date  Time      Patient Name: Jose POLO Jordon     : 1970                 Printed: 2024     Medical Record #: [id number]                     Page 1 of 2                                    [address]   [address]    Consent for Anesthesia    IJose agree to be cared for by an anesthesiologist, who is specially trained to monitor me and give me medicine to put me to sleep or keep me comfortable during my procedure    I understand that my anesthesiologist is not an employee or agent of [facility] or [employer]. He or she works for [employer].    As the patient asking for anesthesia services, I agree to:  Allow the anesthesiologist (anesthesia doctor) to give me medicine and do additional procedures as necessary. Some examples are: Starting or using an “IV” to give me medicine, fluids or blood during my procedure, and having a breathing tube placed to help me breathe when I’m asleep (intubation). In the event that my heart stops working properly, I understand that my anesthesiologist will make every effort to sustain my life, unless otherwise directed by [facility] Do Not Resuscitate documents.  Tell my anesthesia doctor before my procedure:  If I am pregnant.  The last time that I ate or drank.  All of the medicines I take (including prescriptions, herbal supplements, and pills I can buy without a prescription (including street drugs/illegal medications). Failure to inform my anesthesiologist about these medicines may increase my risk of anesthetic complications.  If I am allergic to anything or have had a reaction to anesthesia before.  I understand how the anesthesia medicine will help me (benefits).  I understand that with any type of anesthesia medicine there are risks:  The most common risks are: nausea, vomiting, sore throat, muscle soreness, damage to my eyes, mouth, or teeth (from breathing tube placement).  Rare risks include: remembering what happened during my procedure, allergic reactions to medications, injury to my airway, heart, lungs, vision, nerves, or muscles and in extremely rare instances death.  My doctor has explained to me other choices available  to me for my care (alternatives).  Pregnant Patients (“epidural”):  I understand that the risks of having an epidural (medicine given into my back to help control pain during labor), include itching, low blood pressure, difficulty urinating, headache or slowing of the baby’s heart. Very rare risks include infection, bleeding, seizure, irregular heart rhythms and nerve injury.  Regional Anesthesia (“spinal”, “epidural”, & “nerve blocks”):  I understand that rare but potential complications include headache, bleeding, infection, seizure, irregular heart rhythms, and nerve injury.    I can change my mind about having anesthesia services at any time before I get the medicine.    _____________________________________________________________________________  Patient (or Representative) Signature/Relationship to Patient  Date   Time    _____________________________________________________________________________   Name (if used)    Language/Organization   Time    _____________________________________________________________________________  Anesthesiologist Signature     Date   Time  I have discussed the procedure and information above with the patient (or patient’s representative) and answered their questions. The patient or their representative has agreed to have anesthesia services.    _____________________________________________________________________________  Witness        Date   Time  I have verified that the signature is that of the patient or patient’s representative, and that it was signed before the procedure  Patient Name: Jose Ruvalcaba     : 1970                 Printed: 2024     Medical Record #: [id number]                     Page 2 of 2

## (undated) NOTE — LETTER
OUTSIDE TESTING RESULT REQUEST     IMPORTANT: FOR YOUR IMMEDIATE ATTENTION  Please FAX all test results listed below to: [phone]            Patient Name: Jose Ruvalcaba  Surgery Date: 10/17/2024  Medical Record: [id number]  CSN: [id number]  : 1970 - A: 54 y     Sex: male  Surgeon(s):  Checo Moreno MD  Procedure: CYSTOSCOPY URETEROSCOPY with holium laser lithotripsy, right retrograde pyelogram, right ureteral stent placement  Anesthesia Type: General     Surgeon: Checo Moreno MD     The following Testing and Time Line are REQUIRED PER ANESTHESIA     BMP (requires 4 hour fast) within  60 days      Thank You,   Sent by:Mavis BONILLA

## (undated) NOTE — IP AVS SNAPSHOT
[address]  [facility] [address]            (For Outpatient Use Only) Initial Admit Date: 2022   Inpt/Obs Admit Date: Inpt: 22 / Obs: N/A   Discharge Date:    Arminda Cardoso:  [de-identified]   MRN: [de-identified]   CSN: [id number]   CEID: [id number]        ENCOUNTER  Patient Class: Inpatient Admitting Provider: Saritha Keating MD Unit: [place] [address] Service: Cardiac Telemetry Attending Provider: Hamilton Baxter MD   Bed: 2274-C   Visit Type:   Referring Physician: No ref. provider found Billing Flag:    Admit Diagnosis: Sepsis due to urinary tract infection ([facility] [A41.9, N39.0]      PATIENT  Legal Name:   Juan Antony   Legal Sex: Male  Gender ID: Male             [address] [facility],[address] Floor Name:    PCP:  Anca House MD Home: [phone]   Address:  [facility] : 1970 (52 yrs) Mobile: [phone]         City/[facility]/Zip: [facility] [address] Marital:  Language: Sally Leaks: Will SSN4: [id number] Buddhism: [address] Not Dawit Jone*     Race: Black Or  Ethnicity: Non  Or  O*   EMERGENCY CONTACT   Name Relationship Legal Guardian? Home Phone Work Phone Mobile Phone   Gerardo Gilliam  2.  Willie Rice  Friend    [phone]     GUARANTOR  Guarantor: Satish Zuleikachano : 1970 Home Phone: [phone]   Address: [facility]  Sex: Male Work Phone:    City/State/Zip: [address] [place] [address], [address] [facility]   Rel. to Patient: Self Guarantor ID: [id number]   GUARANTOR EMPLOYER   Employer:  Status: [employer] Newby. 18.   Payor: [id number]: [address] [facility] [address] [facility] [address]   Group Number: [id number] Insurance Type: [address] Name: Shayna Stahl : 1970   Subscriber ID: [id number] Pt Rel to Subscriber: Self   SECONDARY INSURANCE   Payor: MEDICAID Plan: MEDICAID   Group Number:  Insurance Type: INDEMNITY   Subscriber Name: Shayna Favio : 1970   Subscriber ID: [id number] Pt Rel to Subscriber: SELF   TERTIARY INSURANCE   Payor:  Plan:    Group Number:  Insurance Type:    Subscriber Name:  Subscriber :    Subscriber ID:  Pt Rel to Subscriber:    Hospital Account Financial Class: Managed Care    Bernadine 10, 2022